# Patient Record
Sex: MALE | Race: BLACK OR AFRICAN AMERICAN | NOT HISPANIC OR LATINO | Employment: FULL TIME | URBAN - METROPOLITAN AREA
[De-identification: names, ages, dates, MRNs, and addresses within clinical notes are randomized per-mention and may not be internally consistent; named-entity substitution may affect disease eponyms.]

---

## 2018-08-20 ENCOUNTER — OFFICE VISIT (OUTPATIENT)
Dept: UROLOGY | Facility: CLINIC | Age: 33
End: 2018-08-20
Payer: COMMERCIAL

## 2018-08-20 VITALS
DIASTOLIC BLOOD PRESSURE: 107 MMHG | BODY MASS INDEX: 27.4 KG/M2 | WEIGHT: 225 LBS | HEART RATE: 90 BPM | HEIGHT: 76 IN | SYSTOLIC BLOOD PRESSURE: 146 MMHG

## 2018-08-20 DIAGNOSIS — R03.0 BLOOD PRESSURE ELEVATED WITHOUT HISTORY OF HTN: ICD-10-CM

## 2018-08-20 DIAGNOSIS — N20.0 NEPHROLITHIASIS: Primary | ICD-10-CM

## 2018-08-20 DIAGNOSIS — K76.89 LIVER CYST: ICD-10-CM

## 2018-08-20 DIAGNOSIS — K76.0 FATTY LIVER: ICD-10-CM

## 2018-08-20 DIAGNOSIS — R31.29 MICROHEMATURIA: ICD-10-CM

## 2018-08-20 PROCEDURE — 87086 URINE CULTURE/COLONY COUNT: CPT

## 2018-08-20 PROCEDURE — 99204 OFFICE O/P NEW MOD 45 MIN: CPT | Mod: S$GLB,,, | Performed by: UROLOGY

## 2018-08-20 PROCEDURE — 99999 PR PBB SHADOW E&M-NEW PATIENT-LVL IV: CPT | Mod: PBBFAC,,, | Performed by: UROLOGY

## 2018-08-20 PROCEDURE — 3008F BODY MASS INDEX DOCD: CPT | Mod: CPTII,S$GLB,, | Performed by: UROLOGY

## 2018-08-20 NOTE — PROGRESS NOTES
HPI:  Reji Pond Jr. is a 32 y.o. year old male that  presents with No chief complaint on file.  .  This patient comes for emergency room follow-up for ureteral stone    Patient has had prior kidney stones which he has passed spontaneously.  He has never had operations for stones.  Patient was recently in the emergency room with right flank pain.  Stone protocol CT scan reviewed by me which shows a distal minimally obstructing 1-2 mm right UVJ stone.  No other stones present.  Note also made of fatty liver and possible liver cysts for which follow-up imaging is recommended    Patient's pain has completely resolved.  He was having episode frequency which also has resolved.  Patient currently has no dysuria fevers or chills or gross hematuria    Further chart review shows labs from recent emergency room visit showing normal calcium and normal GFR      Past Medical History:   Diagnosis Date    Hypertension     Kidney stones      Social History     Socioeconomic History    Marital status: Single     Spouse name: Not on file    Number of children: Not on file    Years of education: Not on file    Highest education level: Not on file   Social Needs    Financial resource strain: Not on file    Food insecurity - worry: Not on file    Food insecurity - inability: Not on file    Transportation needs - medical: Not on file    Transportation needs - non-medical: Not on file   Occupational History    Not on file   Tobacco Use    Smoking status: Never Smoker    Smokeless tobacco: Never Used   Substance and Sexual Activity    Alcohol use: Yes     Comment: occasionally     Drug use: No    Sexual activity: Not on file   Other Topics Concern    Not on file   Social History Narrative    Not on file     History reviewed. No pertinent surgical history.  History reviewed. No pertinent family history.        Review of Systems  The patient has no chest pains.  The patient has no shortness of breath  Patient wears    "     glasses.  All other review of systems are negative.      Physical Exam:  BP (!) 146/107 (BP Location: Right arm, Patient Position: Sitting)   Pulse 90   Ht 6' 4" (1.93 m)   Wt 102.1 kg (225 lb)   BMI 27.39 kg/m²   General appearance: alert, cooperative, no distress  Constitutional:Oriented to person, place, and time.appears well-developed and well-nourished.   HEENT: Normocephalic, atraumatic, neck symmetrical, no nasal discharge   Eyes: conjunctivae/corneas clear, PERRL, EOM's intact  Lungs: clear to auscultation bilaterally, no dullness to percussion bilaterally  Heart: regular rate and rhythm without rub; no displacement of the PMI   Abdomen: soft, non-tender; bowel sounds normoactive; no organomegaly  :  Penis/perineum without lesions, scrotum without rash/cysts, epididymis nontender bilaterally, urethral meatus in normal location normal size, no penile plaques palpated testes equal in size without masses.  Extremities: extremities symmetric; no clubbing, cyanosis, or edema  Integument: Skin color, texture, turgor normal; no rashes; hair distrubution normal  Neurologic: Alert and oriented X 3, normal strength, normal coordination and gait  Psychiatric: no pressured speech; normal affect; no evidence of impaired cognition     LABS:    Complete Blood Count  Lab Results   Component Value Date    RBC 5.36 08/14/2018    HGB 14.6 08/14/2018    HCT 44.1 08/14/2018    MCV 82 08/14/2018    MCH 27.2 08/14/2018    MCHC 33.1 08/14/2018    RDW 13.7 08/14/2018     08/14/2018    MPV 11.6 08/14/2018    GRAN 4.5 08/14/2018    GRAN 63.7 08/14/2018    LYMPH 1.6 08/14/2018    LYMPH 22.1 08/14/2018    MONO 0.6 08/14/2018    MONO 8.9 08/14/2018    EOS 0.3 08/14/2018    BASO 0.04 08/14/2018    EOSINOPHIL 4.7 08/14/2018    BASOPHIL 0.6 08/14/2018    DIFFMETHOD Automated 08/14/2018       Comprehensive Metabolic Panel  Lab Results   Component Value Date    GLU 80 08/14/2018    BUN 13 08/14/2018    CREATININE 1.01 " 08/14/2018     08/14/2018    K 4.0 08/14/2018     08/14/2018    PROT 7.8 08/14/2018    ALBUMIN 4.3 08/14/2018    BILITOT 0.4 08/14/2018    AST 22 08/14/2018    ALKPHOS 97 08/14/2018    CO2 29 08/14/2018    ALT 40 08/14/2018    ANIONGAP 9 08/14/2018    EGFRNONAA >60.0 08/14/2018    ESTGFRAFRICA >60.0 08/14/2018       PSA  No results found for: PSA      Assessment:    ICD-10-CM ICD-9-CM    1. Nephrolithiasis N20.0 592.0    2. Fatty liver K76.0 571.8    3. Liver cyst K76.89 573.8 Ambulatory consult to Gastroenterology   4. Microhematuria R31.29 599.72 Urine culture   5. Blood pressure elevated without history of HTN R03.0 796.2      The primary encounter diagnosis was Nephrolithiasis. Diagnoses of Fatty liver, Liver cyst, Microhematuria, and Blood pressure elevated without history of HTN were also pertinent to this visit.      Plan:  1.  Nephrolithiasis.  Plan.  Although patient was not able to recover stone, by symptoms this has passed.    I encouraged patient to increase fluid intake.  Follow up with me on an as-needed basis    Numbers 2 and 3.  Fatty liver and liver cysts.  Plan.  This or these findings pointed out to the patient. Given radiology recommendation for further imaging, consult entered for GI evaluation.    4.  Micro hematuria.  Plan.  Undoubtedly due to stone passage.  Discussed cystoscopy for complete evaluation of hematuria to rule out urothelial cancer.  At this point patient does not want cystoscopy.  Given patient's young age and lack of risk factors, I think this reasonable.  Patient follow-up with me if he develops gross hematuria    5.   Elevated blood pressure.  Plan.  This finding pointed out to the patient.  I recommend that the patient follow up with the patient's PCP for this.  Orders Placed This Encounter   Procedures    Urine culture    Ambulatory consult to Gastroenterology           Denys Camara MD    PLEASE NOTE:  Please be advised that portions of this note were  dictated using voice recognition software and may contain dictation related errors in spelling/grammar/appropriate pronouns/syntax or other errors that might have not been found and or corrected on text review.

## 2018-08-21 LAB — BACTERIA UR CULT: NO GROWTH

## 2018-08-22 ENCOUNTER — TELEPHONE (OUTPATIENT)
Dept: UROLOGY | Facility: CLINIC | Age: 33
End: 2018-08-22

## 2018-08-22 NOTE — TELEPHONE ENCOUNTER
----- Message from Genoveva Miranda sent at 8/21/2018  6:08 PM CDT -----  Contact: patient  Patient says he received a message that he had an appointment set for tomorrow.    He wishes to speak to a nurse regarding this.    He can be reached at [phone]    Thanks  KB

## 2018-08-22 NOTE — TELEPHONE ENCOUNTER
----- Message from Denys Camara MD sent at 8/22/2018  8:26 AM CDT -----  Please contact the patient and let the patient know that urine culture showed no infection.

## 2018-09-06 ENCOUNTER — INITIAL CONSULT (OUTPATIENT)
Dept: GASTROENTEROLOGY | Facility: CLINIC | Age: 33
End: 2018-09-06
Payer: COMMERCIAL

## 2018-09-06 VITALS — WEIGHT: 218 LBS | BODY MASS INDEX: 26.54 KG/M2

## 2018-09-06 DIAGNOSIS — K76.89 LIVER CYST: Primary | ICD-10-CM

## 2018-09-06 PROCEDURE — 99999 PR PBB SHADOW E&M-EST. PATIENT-LVL II: CPT | Mod: PBBFAC,,,

## 2018-09-06 PROCEDURE — 99245 OFF/OP CONSLTJ NEW/EST HI 55: CPT | Mod: S$GLB,,, | Performed by: INTERNAL MEDICINE

## 2018-09-06 NOTE — PROGRESS NOTES
Subjective:      Patient ID: Reji Pond Jr. is a 32 y.o. male.    Chief Complaint: GI Problem (Liver Cyst)      HPI:  Reji Pond Jr. is a 32 y.o. male healthy except for HTN and kidney stones coming for incidental finding of liver lesion on non contrasted CT. Patient denies any GI symptoms. He denies any FHX of liver disease or malignancy. Recent intentional weight loss. He denies IVDU, smoking or significant alcohol use.      Review of patient's allergies indicates:  No Known Allergies    Past Medical History:   Diagnosis Date    Hypertension     Kidney stones        History reviewed. No pertinent surgical history.    History reviewed. No pertinent family history.    Social History     Socioeconomic History    Marital status: Single     Spouse name: None    Number of children: None    Years of education: None    Highest education level: None   Social Needs    Financial resource strain: None    Food insecurity - worry: None    Food insecurity - inability: None    Transportation needs - medical: None    Transportation needs - non-medical: None   Occupational History    None   Tobacco Use    Smoking status: Former Smoker    Smokeless tobacco: Never Used   Substance and Sexual Activity    Alcohol use: Yes     Frequency: 2-3 times a week     Drinks per session: 3 or 4     Binge frequency: Never     Comment: occasionally     Drug use: No    Sexual activity: None   Other Topics Concern    None   Social History Narrative    None       Current Outpatient Medications   Medication Sig Dispense Refill    diclofenac (VOLTAREN) 25 MG TbEC Take 1 tablet (25 mg total) by mouth 3 (three) times daily. 15 tablet 0    fluticasone (FLONASE) 50 mcg/actuation nasal spray 1 spray (50 mcg total) by Each Nare route 2 (two) times daily as needed. 15 g 0    ondansetron (ZOFRAN) 4 MG tablet Take 1 tablet (4 mg total) by mouth every 6 (six) hours. 12 tablet 0    tamsulosin (FLOMAX) 0.4 mg Cap Take 1 capsule  (0.4 mg total) by mouth once daily. for 7 days 7 capsule 0     No current facility-administered medications for this visit.        Review of Systems   Constitutional: Negative for activity change, appetite change, chills and fever.   HENT: Negative for ear pain, hearing loss, mouth sores and sore throat.    Eyes: Negative for pain, discharge, redness, itching and visual disturbance.   Respiratory: Negative for cough, shortness of breath and wheezing.    Cardiovascular: Negative for chest pain and palpitations.   Gastrointestinal: Negative for abdominal distention, abdominal pain, blood in stool, constipation, diarrhea and nausea.   Endocrine: Negative for cold intolerance and heat intolerance.   Skin: Negative for color change and rash.   Neurological: Negative for dizziness and headaches.   Psychiatric/Behavioral: Negative for confusion.       Objective:     Physical Exam   Constitutional: He is oriented to person, place, and time. No distress.   HENT:   Head: Normocephalic.   Eyes: Conjunctivae and EOM are normal. Pupils are equal, round, and reactive to light. No scleral icterus.   Neck: Normal range of motion. No thyromegaly present.   Cardiovascular: Normal rate, regular rhythm and normal heart sounds. Exam reveals no gallop and no friction rub.   No murmur heard.  Pulmonary/Chest: Breath sounds normal. No respiratory distress. He has no wheezes.   Abdominal: Soft. Bowel sounds are normal. He exhibits no distension. There is no tenderness. There is no guarding.   Lymphadenopathy:     He has no cervical adenopathy.   Neurological: He is alert and oriented to person, place, and time. No cranial nerve deficit.   Skin: Skin is warm and dry. No rash noted. He is not diaphoretic.   Psychiatric: He has a normal mood and affect. His behavior is normal.       Assessment:     Liver lesions  History is insignificant. CT was non-contrast.     Plan:     Perform a triple phase CT of the liver.   Refer to hepatology based on  results.

## 2018-09-09 ENCOUNTER — DOCUMENTATION ONLY (OUTPATIENT)
Dept: TRANSPLANT | Facility: CLINIC | Age: 33
End: 2018-09-09

## 2018-09-09 NOTE — LETTER
September 9, 2018    Reji Pond  [id number]  [facility] [address]      Dear Reji Pond:    Your doctor has referred you to the [facility]. We are sending this letter as a reminder for you to make an appointment with us to complete the referral process.   Please call us at your earliest convenience at [phone] to schedule your appointment.  We look forward to seeing you soon.  If you received a call, and are scheduled, please disregard this letter..       Sincerely,        [facility]   [address] [place] [id number]  [phone]

## 2018-09-10 ENCOUNTER — HOSPITAL ENCOUNTER (OUTPATIENT)
Dept: RADIOLOGY | Facility: HOSPITAL | Age: 33
Discharge: HOME | End: 2018-09-10
Attending: INTERNAL MEDICINE
Payer: COMMERCIAL

## 2018-09-10 DIAGNOSIS — K76.89 LIVER CYST: ICD-10-CM

## 2018-09-10 PROCEDURE — 74160 CT ABDOMEN W/CONTRAST: CPT | Mod: TC

## 2018-09-10 PROCEDURE — 74160 CT ABDOMEN W/CONTRAST: CPT | Mod: 26,,, | Performed by: RADIOLOGY

## 2018-09-10 PROCEDURE — 25500020 PHARM REV CODE 255: Performed by: INTERNAL MEDICINE

## 2018-09-10 RX ADMIN — IOHEXOL 100 ML: 350 INJECTION, SOLUTION INTRAVENOUS at 11:09

## 2018-09-10 NOTE — NURSING
Pt records reviewed.   Pt will be referred to Hepatology.    Initial referral received  from the [facility].   Referring Provider/diagnosis  ODALYS MCMILLAN Provider:   Diagnosis: Liver cyst           Referral letter sent to patient.

## 2018-09-12 ENCOUNTER — TELEPHONE (OUTPATIENT)
Dept: HEPATOLOGY | Facility: CLINIC | Age: 33
End: 2018-09-12

## 2018-09-12 ENCOUNTER — TELEPHONE (OUTPATIENT)
Dept: ENDOSCOPY | Facility: HOSPITAL | Age: 33
End: 2018-09-12

## 2018-09-12 NOTE — TELEPHONE ENCOUNTER
----- Message from Bishnu Esquivel MD sent at 9/11/2018  5:50 PM CDT -----  Lesions of the liver are benign and no need for further workup.

## 2018-09-12 NOTE — TELEPHONE ENCOUNTER
----- Message from Lennie Durham sent at 9/10/2018  3:36 PM CDT -----  Regarding: REFERRAL  Iram, can you offer Mr. Salty Morgan?  Thanks!    Marline  ----- Message -----  From: Suzanna Yen  Sent: 9/9/2018  10:54 PM  To: Hepatology Scheduling    Pt records reviewed.   Pt will be referred to Hepatology.    [facility]   Referring Provider/diagnosis  ODALYS MCMILLAN Provider:  Diagnosis: Liver cyst

## 2018-10-23 ENCOUNTER — OFFICE VISIT (OUTPATIENT)
Dept: HEPATOLOGY | Facility: CLINIC | Age: 33
End: 2018-10-23
Payer: COMMERCIAL

## 2018-10-23 VITALS
HEART RATE: 98 BPM | RESPIRATION RATE: 18 BRPM | DIASTOLIC BLOOD PRESSURE: 74 MMHG | HEIGHT: 72 IN | WEIGHT: 213.38 LBS | SYSTOLIC BLOOD PRESSURE: 128 MMHG | BODY MASS INDEX: 28.9 KG/M2 | OXYGEN SATURATION: 98 % | TEMPERATURE: 98 F

## 2018-10-23 DIAGNOSIS — R79.89 ELEVATED LIVER FUNCTION TESTS: Primary | ICD-10-CM

## 2018-10-23 DIAGNOSIS — K76.0 FATTY LIVER: ICD-10-CM

## 2018-10-23 DIAGNOSIS — D18.03 HEMANGIOMA OF LIVER: ICD-10-CM

## 2018-10-23 PROCEDURE — 99999 PR PBB SHADOW E&M-EST. PATIENT-LVL IV: CPT | Mod: PBBFAC,,, | Performed by: INTERNAL MEDICINE

## 2018-10-23 PROCEDURE — 3008F BODY MASS INDEX DOCD: CPT | Mod: CPTII,S$GLB,, | Performed by: INTERNAL MEDICINE

## 2018-10-23 PROCEDURE — 99204 OFFICE O/P NEW MOD 45 MIN: CPT | Mod: S$GLB,,, | Performed by: INTERNAL MEDICINE

## 2018-10-23 NOTE — PROGRESS NOTES
HEPATOLOGY CONSULTATION    Referring Physician: Bishnu Esquivel MD  Current Corresponding Physician: Bishnu Esquivel MD    Reason for Consultation: Consultation for evaluation of Liver lesions and Elevated Hepatic Enzymes    History of Present Illness: Reji Pond Jr. is a 33 y.o. malewho had a CT scan of the abdomen done 9/10./18: The liver is mildly enlarged and normal in attenuation.  Three arterial enhancing foci are seen in the right hepatic lobe, measuring 5.5 cm in segment VII, 2.3 cm in segment VI/VII, and 2 cm in segment VI.  These lesions demonstrate peripheral, nodular, discontinuous enhancement with progressive filling.  Findings are most consistent with hemangiomas.  A ct scan done 8/14/18 to evaluate kidney stones revealed the liver to be enlarged and fatty. Liver enzymes have been elevated:  2/3/18: , AST 36, ALKP 95. 8/14/18: ALT 40, AST 22, ALKP 97. Liver function is normal. Plts are well preserved (259). The patient drinks socially ~5/7 days per week. There is no family hx of liver disease. The patient denies any symptoms of decompensated cirrhosis, including no ascites or edema, cognitive problems that would suggest hepatic encephalopathy, or GI bleeding from varices.      Chief Complaint   Patient presents with    Liver lesions    Elevated Hepatic Enzymes       Past Medical History:   Diagnosis Date    Hypertension     Kidney stones      Outpatient Encounter Medications as of 10/23/2018   Medication Sig Dispense Refill    [DISCONTINUED] diclofenac (VOLTAREN) 25 MG TbEC Take 1 tablet (25 mg total) by mouth 3 (three) times daily. 15 tablet 0    [DISCONTINUED] fluticasone (FLONASE) 50 mcg/actuation nasal spray 1 spray (50 mcg total) by Each Nare route 2 (two) times daily as needed. 15 g 0    [DISCONTINUED] ondansetron (ZOFRAN) 4 MG tablet Take 1 tablet (4 mg total) by mouth every 6 (six) hours. 12 tablet 0    [DISCONTINUED] tamsulosin (FLOMAX) 0.4 mg Cap Take 1 capsule  (0.4 mg total) by mouth once daily. for 7 days 7 capsule 0     No facility-administered encounter medications on file as of 10/23/2018.      Review of patient's allergies indicates:  No Known Allergies  History reviewed. No pertinent family history.    Social History     Socioeconomic History    Marital status: Single     Spouse name: Not on file    Number of children: Not on file    Years of education: Not on file    Highest education level: Not on file   Social Needs    Financial resource strain: Not on file    Food insecurity - worry: Not on file    Food insecurity - inability: Not on file    Transportation needs - medical: Not on file    Transportation needs - non-medical: Not on file   Occupational History    Not on file   Tobacco Use    Smoking status: Former Smoker    Smokeless tobacco: Never Used   Substance and Sexual Activity    Alcohol use: Yes     Frequency: 2-3 times a week     Drinks per session: 3 or 4     Binge frequency: Never     Comment: occasionally     Drug use: No    Sexual activity: Not on file   Other Topics Concern    Not on file   Social History Narrative    Not on file     Review of Systems   Constitutional: Negative.    HENT: Negative.    Eyes: Negative.    Respiratory: Negative.    Cardiovascular: Negative.    Gastrointestinal: Negative.    Genitourinary: Negative.    Musculoskeletal: Negative.    Skin: Negative.    Neurological: Negative.    Psychiatric/Behavioral: Negative.      Vitals:    10/23/18 1045   BP: 128/74   Pulse: 98   Resp: 18   Temp: 98.1 °F (36.7 °C)       Physical Exam   Constitutional: He is oriented to person, place, and time. He appears well-developed and well-nourished.   HENT:   Head: Normocephalic and atraumatic.   Eyes: Conjunctivae and EOM are normal. Pupils are equal, round, and reactive to light. No scleral icterus.   Neck: Normal range of motion. Neck supple. No thyromegaly present.   Cardiovascular: Normal rate, regular rhythm and normal  heart sounds.   Pulmonary/Chest: Effort normal and breath sounds normal. He has no rales.   Abdominal: Soft. Bowel sounds are normal. He exhibits no distension and no mass. There is no tenderness.   Musculoskeletal: Normal range of motion. He exhibits no edema.   Neurological: He is alert and oriented to person, place, and time.   Skin: Skin is warm and dry. No rash noted.   Psychiatric: He has a normal mood and affect.   Vitals reviewed.      MELD-Na score: 6 at 2/3/2018  1:43 AM  MELD score: 6 at 2/3/2018  1:43 AM  Calculated from:  Serum Creatinine: 0.88 mg/dL (Rounded to 1 mg/dL) at 2/3/2018  1:43 AM  Serum Sodium: 143 mmol/L (Rounded to 137 mmol/L) at 2/3/2018  1:43 AM  Total Bilirubin: 0.5 mg/dL (Rounded to 1 mg/dL) at 2/3/2018  1:43 AM  INR(ratio): 1 at 2/3/2018  1:43 AM  Age: 32 years    Lab Results   Component Value Date    GLU 80 08/14/2018    BUN 13 08/14/2018    CREATININE 1.01 08/14/2018    CALCIUM 9.1 08/14/2018     08/14/2018    K 4.0 08/14/2018     08/14/2018    PROT 7.8 08/14/2018    CO2 29 08/14/2018    ANIONGAP 9 08/14/2018    WBC 7.06 08/14/2018    RBC 5.36 08/14/2018    HGB 14.6 08/14/2018    HCT 44.1 08/14/2018    MCV 82 08/14/2018    MCH 27.2 08/14/2018    MCHC 33.1 08/14/2018     Lab Results   Component Value Date    RDW 13.7 08/14/2018     08/14/2018    MPV 11.6 08/14/2018    GRAN 4.5 08/14/2018    GRAN 63.7 08/14/2018    LYMPH 1.6 08/14/2018    LYMPH 22.1 08/14/2018    MONO 0.6 08/14/2018    MONO 8.9 08/14/2018    EOSINOPHIL 4.7 08/14/2018    BASOPHIL 0.6 08/14/2018    EOS 0.3 08/14/2018    BASO 0.04 08/14/2018    APTT 30.0 02/03/2018    ALBUMIN 4.3 08/14/2018    AST 22 08/14/2018    ALT 40 08/14/2018    ALKPHOS 97 08/14/2018    LABPROT 12.3 02/03/2018    INR 1.0 02/03/2018       Assessment and Plan:  There is no problem list on file for this patient.    Reji Leonel Pond Jr. is a 33 y.o. male with Liver lesions and Elevated Hepatic Enzymes  My current recommendations:  1.  Liver lesions are c/w hemangiomas and do not require any further f/u.  2. Elevated liver tests and fatty liver on imaging: this could be from ERVIN vs PATINO. I have recommended that the patient cut down on the amount that he is drinking and attempt to lose some weight. I will screen him for other causes of elevated liver enzymes and will also do a fibroscan to noninvasively stage his liver disease.  Return 1 month

## 2018-10-24 ENCOUNTER — TELEPHONE (OUTPATIENT)
Dept: HEPATOLOGY | Facility: CLINIC | Age: 33
End: 2018-10-24

## 2018-10-25 PROBLEM — R79.89 ELEVATED LIVER FUNCTION TESTS: Status: ACTIVE | Noted: 2018-10-25

## 2018-10-25 PROBLEM — D18.03 HEMANGIOMA OF LIVER: Status: ACTIVE | Noted: 2018-10-25

## 2018-10-25 PROBLEM — K76.0 FATTY LIVER: Status: ACTIVE | Noted: 2018-10-25

## 2020-06-10 ENCOUNTER — HOSPITAL ENCOUNTER (OUTPATIENT)
Dept: RADIOLOGY | Facility: HOSPITAL | Age: 35
Discharge: HOME | End: 2020-06-10
Attending: NURSE PRACTITIONER
Payer: COMMERCIAL

## 2020-06-10 ENCOUNTER — OFFICE VISIT (OUTPATIENT)
Dept: ORTHOPEDICS | Facility: CLINIC | Age: 35
End: 2020-06-10
Payer: COMMERCIAL

## 2020-06-10 VITALS
SYSTOLIC BLOOD PRESSURE: 168 MMHG | HEIGHT: 72 IN | WEIGHT: 227.5 LBS | DIASTOLIC BLOOD PRESSURE: 101 MMHG | BODY MASS INDEX: 30.81 KG/M2 | HEART RATE: 92 BPM | TEMPERATURE: 99 F

## 2020-06-10 DIAGNOSIS — M25.562 ACUTE PAIN OF LEFT KNEE: Primary | ICD-10-CM

## 2020-06-10 DIAGNOSIS — M25.562 ACUTE PAIN OF LEFT KNEE: ICD-10-CM

## 2020-06-10 PROCEDURE — 99999 PR PBB SHADOW E&M-EST. PATIENT-LVL III: CPT | Mod: PBBFAC,,, | Performed by: NURSE PRACTITIONER

## 2020-06-10 PROCEDURE — 73564 XR KNEE ORTHO LEFT WITH FLEXION: ICD-10-PCS | Mod: 26,LT,, | Performed by: RADIOLOGY

## 2020-06-10 PROCEDURE — 73564 X-RAY EXAM KNEE 4 OR MORE: CPT | Mod: 26,LT,, | Performed by: RADIOLOGY

## 2020-06-10 PROCEDURE — 99203 OFFICE O/P NEW LOW 30 MIN: CPT | Mod: S$GLB,,, | Performed by: NURSE PRACTITIONER

## 2020-06-10 PROCEDURE — 3008F BODY MASS INDEX DOCD: CPT | Mod: CPTII,S$GLB,, | Performed by: NURSE PRACTITIONER

## 2020-06-10 PROCEDURE — 73562 XR KNEE ORTHO LEFT WITH FLEXION: ICD-10-PCS | Mod: 26,RT,, | Performed by: RADIOLOGY

## 2020-06-10 PROCEDURE — 99203 PR OFFICE/OUTPT VISIT, NEW, LEVL III, 30-44 MIN: ICD-10-PCS | Mod: S$GLB,,, | Performed by: NURSE PRACTITIONER

## 2020-06-10 PROCEDURE — 99999 PR PBB SHADOW E&M-EST. PATIENT-LVL III: ICD-10-PCS | Mod: PBBFAC,,, | Performed by: NURSE PRACTITIONER

## 2020-06-10 PROCEDURE — 73562 X-RAY EXAM OF KNEE 3: CPT | Mod: TC,RT,59

## 2020-06-10 PROCEDURE — 3008F PR BODY MASS INDEX (BMI) DOCUMENTED: ICD-10-PCS | Mod: CPTII,S$GLB,, | Performed by: NURSE PRACTITIONER

## 2020-06-10 PROCEDURE — 73562 X-RAY EXAM OF KNEE 3: CPT | Mod: 26,RT,, | Performed by: RADIOLOGY

## 2020-06-10 NOTE — PROGRESS NOTES
"CC: Pain of the Left Knee      HPI: Pt with c/o  for left knee pain intermittently for the past two years. The pain started after he slipped at work and fell onto his left knee folded under him. He was seen at urgent care and iced and elevated. Since then he has had several instances where the knee popped and then hurt acutely before calming back down. There is catching and locking of the knee. He is a  and using the clutch and bouncing around on the seat aggravates the left knee pain and stiffness. He has taken voltaren without relief. He is ambulating without assistive device. There is not a limp.    ROS  General: denies fever and chills  Resp: no c/o sob  CVS: no c/o cp  MSK: c/o left knee pain which is acute and follows popping of the knee with catching, locking and stiffness    PE  General: AAOx3, pleasant and cooperative  Resp: respirations even and unlabored  MSK: left knee exam  0 degrees extension  130 degrees flexion  No warmth or erythema   - effusion  - tenderness over joint line  + katharine, medial  - crepitus  - instability    Xray:  Reviewed by me with the patient: Bones are well mineralized.  Joint spaces are well maintained.  No effusion on the left    Assessment:  Left knee injury    Plan:  In light of normal xray and lack of relief with voltaren and catching and locking with + katharine, will get an MRI to further evaluate the pain  voltaren prn   RICE  Advised patient that he needs to see his pcp regarding his elevated blood pressure. He states that it is "white coat syndrome", but I advised further evaluation.  F/u results by phone    "

## 2020-06-17 ENCOUNTER — HOSPITAL ENCOUNTER (OUTPATIENT)
Dept: RADIOLOGY | Facility: HOSPITAL | Age: 35
Discharge: HOME | End: 2020-06-17
Attending: NURSE PRACTITIONER
Payer: COMMERCIAL

## 2020-06-17 DIAGNOSIS — M25.562 ACUTE PAIN OF LEFT KNEE: ICD-10-CM

## 2020-06-17 PROCEDURE — 73721 MRI JNT OF LWR EXTRE W/O DYE: CPT | Mod: TC,LT

## 2020-06-17 PROCEDURE — 73721 MRI JNT OF LWR EXTRE W/O DYE: CPT | Mod: 26,LT,, | Performed by: RADIOLOGY

## 2020-06-17 PROCEDURE — 73721 MRI KNEE WITHOUT CONTRAST LEFT: ICD-10-PCS | Mod: 26,LT,, | Performed by: RADIOLOGY

## 2020-06-22 ENCOUNTER — TELEPHONE (OUTPATIENT)
Dept: ORTHOPEDICS | Facility: CLINIC | Age: 35
End: 2020-06-22

## 2020-06-22 NOTE — TELEPHONE ENCOUNTER
----- Message from Jayden Huizar sent at 6/22/2020  3:06 PM CDT -----  Contact: Patient @[phone]  Patient returning a missed call, pls return call

## 2020-06-22 NOTE — TELEPHONE ENCOUNTER
Returned call to patient. 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

## 2020-06-22 NOTE — TELEPHONE ENCOUNTER
Called patient regarding his MRI results. Left a message letting him know about the area where the cartilage has worn down. Will send him a message as well so that he can call back at his convenience.

## 2020-07-15 ENCOUNTER — TELEPHONE (OUTPATIENT)
Dept: SPORTS MEDICINE | Facility: CLINIC | Age: 35
End: 2020-07-15

## 2020-07-15 ENCOUNTER — OFFICE VISIT (OUTPATIENT)
Dept: SPORTS MEDICINE | Facility: CLINIC | Age: 35
End: 2020-07-15
Payer: COMMERCIAL

## 2020-07-15 ENCOUNTER — HOSPITAL ENCOUNTER (OUTPATIENT)
Dept: RADIOLOGY | Facility: HOSPITAL | Age: 35
Discharge: HOME | End: 2020-07-15
Attending: ORTHOPAEDIC SURGERY
Payer: COMMERCIAL

## 2020-07-15 VITALS
HEART RATE: 84 BPM | WEIGHT: 227 LBS | BODY MASS INDEX: 30.75 KG/M2 | HEIGHT: 72 IN | DIASTOLIC BLOOD PRESSURE: 107 MMHG | SYSTOLIC BLOOD PRESSURE: 159 MMHG

## 2020-07-15 DIAGNOSIS — M94.262 CHONDROMALACIA OF LEFT KNEE: ICD-10-CM

## 2020-07-15 DIAGNOSIS — M25.562 CHRONIC PAIN OF LEFT KNEE: ICD-10-CM

## 2020-07-15 DIAGNOSIS — M25.561 RIGHT KNEE PAIN, UNSPECIFIED CHRONICITY: ICD-10-CM

## 2020-07-15 DIAGNOSIS — G89.29 CHRONIC PAIN OF LEFT KNEE: ICD-10-CM

## 2020-07-15 DIAGNOSIS — M25.662 KNEE STIFFNESS, LEFT: ICD-10-CM

## 2020-07-15 DIAGNOSIS — Z01.818 PREOPERATIVE TESTING: Primary | ICD-10-CM

## 2020-07-15 DIAGNOSIS — M23.92 ACUTE INTERNAL DERANGEMENT OF LEFT KNEE: Primary | ICD-10-CM

## 2020-07-15 PROCEDURE — 99999 PR PBB SHADOW E&M-EST. PATIENT-LVL IV: ICD-10-PCS | Mod: PBBFAC,,, | Performed by: ORTHOPAEDIC SURGERY

## 2020-07-15 PROCEDURE — 73564 X-RAY EXAM KNEE 4 OR MORE: CPT | Mod: TC,50

## 2020-07-15 PROCEDURE — 99999 PR PBB SHADOW E&M-EST. PATIENT-LVL IV: CPT | Mod: PBBFAC,,, | Performed by: ORTHOPAEDIC SURGERY

## 2020-07-15 PROCEDURE — 3008F PR BODY MASS INDEX (BMI) DOCUMENTED: ICD-10-PCS | Mod: CPTII,S$GLB,, | Performed by: ORTHOPAEDIC SURGERY

## 2020-07-15 PROCEDURE — 3008F BODY MASS INDEX DOCD: CPT | Mod: CPTII,S$GLB,, | Performed by: ORTHOPAEDIC SURGERY

## 2020-07-15 PROCEDURE — 99203 PR OFFICE/OUTPT VISIT, NEW, LEVL III, 30-44 MIN: ICD-10-PCS | Mod: S$GLB,,, | Performed by: ORTHOPAEDIC SURGERY

## 2020-07-15 PROCEDURE — 99203 OFFICE O/P NEW LOW 30 MIN: CPT | Mod: S$GLB,,, | Performed by: ORTHOPAEDIC SURGERY

## 2020-07-15 PROCEDURE — 73564 X-RAY EXAM KNEE 4 OR MORE: CPT | Mod: 26,,, | Performed by: RADIOLOGY

## 2020-07-15 PROCEDURE — 73564 XR KNEE ORTHO BILAT WITH FLEXION: ICD-10-PCS | Mod: 26,,, | Performed by: RADIOLOGY

## 2020-07-15 RX ORDER — CELECOXIB 200 MG/1
200 CAPSULE ORAL 2 TIMES DAILY
Qty: 60 CAPSULE | Refills: 6 | Status: SHIPPED | OUTPATIENT
Start: 2020-07-15 | End: 2020-08-14

## 2020-07-15 NOTE — PATIENT INSTRUCTIONS
The knee is the most frequently injured joint in the body. Most injuries are due to the extreme stresses during twisting or turning activities or sports. The knee joint is made up of three bones, four major ligaments and two types of cartilage.    FEMUR (Thigh Bone)  The femoral condyles are the two rounded prominences at the end of the femur. The motion of the condyles include rocking, gliding and rotating. Any abnormal surface structure or cartilage damage can lead to cartilage breakdown and arthritis (loss of cartilage padding).    TIBIA (Shin Bone)  The Tibia meets the Femur at the knee in two areas on which the Femur rides. This area is called the Tibial Plateau.    PATELLA (Knee Cap)  The Patella is a bone that lies within the quadriceps tendon. It rides in the shallow groove over the front part if the Femur called the Trochlea. The Patella acts as a lever arm to help the quadriceps muscle extend the knee.    Articular Cartilage covers the ends of these bones at the knee joint. This glistening white substance has the consistency of firm rubber but is actually a mixture of collagen and special large sponge-like molecules all maintained by living cartilage cells (chondrocytes). With normal joint fluid for lubrication, the surface is more slippery than ice on ice and allows smooth and easy knee joint motion.      MENISCUS  The other type of knee cartilage is the Meniscal Cartilage (fibrocartilage). These C-shaped pads are found between the thigh bone and shin bone -- one on each side -- thus called the Medial Meniscus (inner thigh aspect) and Lateral Meniscus (outer aspect). The menisci are attached to the Tibial Plateaus, and serve to cushion and transfer joint force more evenly to the tibia. They accomplish this by distributing joint forces over a larger area of the joint -- transferring force from the curved femoral condylar margins to the flatter tibial plateaus. Damage to the meniscal cushion may result  "in increased stress on the articular cartilage of the tibia and femur and early arthritis.      The smooth, white shiny covering of the bones in the joint is known as Articular Cartilage, and is made of a material called "hyaline cartilage". Damage to this articular cartilage can occur from trauma (such as a fall or car accident), but more often, it is the result of repetitive injuries over a long period of time.    Articular cartilage injuries are common, occurring in 20- 70% of knee injuries. The function of articular cartilage is to optimize joint function by reducing friction and increasing shock absorption. Articular cartilage is similar to the "tread on a car tire".    Injuries to the articular cartilage have a low capacity for healing. Both superficial and full-thickness cartilage injuries may progress to the mechanical wear and breakdown of the cartilage matrix.  The breakdown of articular cartilage will eventually cause osteoarthritis, which is a very serious painful condition. With a full-thickness cartilage injury, continued activity may cause the articular cartilage injury to progress rapidly to traumatic arthritis. The larger the initial cartilage injury, the faster the potential progression of arthritis. Articular cartilage injury or wear leads to joint pain.      Common symptoms include pain when the joint is moved or loaded (like walking or running). Catching, locking, or creaking (crepitation) may occur with joint motion or weight bearing (like twisting or standing  from a seated position). Articular cartilage damage may be superficial (partial), deep (complete full-thickness), or osteochondral (bone and cartilage).    Superficial cartilage injuries extend into the upper 50% of the depth of the cartilage. These injuries typically do not heal, but may not progress to arthritis unless they are large and located in a weight-bearing area of the knee.  Deep or full-thickness lesions extend down to the " "bone, but not through it. These injuries have very little healing potential and tend to progress to osteoarthritis if located in a weight-bearing area.    Osteochondral (bone plus cartilage) injuries extend down through the subchondral bone (deep to the cartilage). These injuries may heal by allowing blood vessel ingrowth with fibrous cells to produce a fibrous (scar-like) tissue repair.      Treatment Options For Smaller Defects  Most studies suggest the repair tissue formed from bone marrow stimulation techniques is fibrocartilage (scar tissue -not hyaline cartilage), which is less resistant to wear with the forces in the knee joint and often deteriorates over time Bone marrow stimulation techniques can be successful in eliminating symptoms in many patients, especially young patients with small lesions.   Without early intervention, cartilage degeneration may proceed, and prevent a chance for successful pain- free function.    Arthroscopic Debridement  This is an arthroscopic procedure, often known as a "knee scope". The surgeon uses minimally invasive techniques with a small fiberoptic light source attached to a video camera to locate damaged cartilage and trim the loose edges away to smooth the surface. The surgeon may trim meniscus tears and remove loose cartilage that causes catching or locking symptoms and attempts to prevent any further flaking off that can irritate the knee joint lining and cause swelling.  Arthroscopic debridement is most effective for small lesions, less than 1 cm2 (3/8 inch). It is not as effective for larger lesions because it does not fill the lesion with any repair tissue, leaving the edges exposed to high forces in the knee and continued wear. Despite relieving some symptoms from cartilage tears or loss, arthroscopic cleaning does not prevent the progression of arthritis, but may relieve mechanical symptoms.    Bone Marrow Stimulation Techniques  Three different techniques are " "available to create bleeding and clot formation at the cartilage defect. Blood vessels and fibrous cells migrate to the area to form a fibrous scar-tissue repair tissue to fill the hole in the articular cartilage. Drilling creates multiple small holes through the subchondral bone to allow bleeding into the defect.   Microfracture or "picking" creates small fractures in the subchondral bone to encourage bleeding into the defect.      Abrasion arthroplasty is a superficial shaving of the subchondral bone surface to create bleeding into the defect. Bone marrow stimulation techniques are successful in eliminating symptoms in many patients, especially younger patients with smaller lesions well contained lesions.       For patients with more extensive cartilage damage there are several techniques available    Osteochondral Autograft  This technique is analogous to a hair-plug transfer. The surgeon removes a small section of the patient's own cartilage along with the underlying bone plug, hence the name osteochondral (bone and cartilage) graft.      Bone and cartilage cylinders are harvested from a minor weight bearing area of the knee joint and transplanted into prepared holes in the damage area. This process works much like a hair transplant. Unfortunately, a limited amount of normal osteochondral tissue is available for harvest. The typical size of defect treatable with this method is between 1 to 2 cm2.      Treatment Options For Larger Lesions    Autologous Chondrocyte Implantation (ACI) Carticel  For cartilage defects greater than 2 square centimeters or if there are multiple defects in the knee, one of the newer techniques for the cartilage regeneration, is ACI. ACI is FDA indicated for full-thickness cartilage or osteochondral lesions located in the knee.    ACI is performed in two stages. The first stage is performed when initially assessing the joint arthroscopically. A small amount of cartilage is harvested and " sent to a laboratory for cell culture and growth.    The patient's own cartilage cells (chondrocytes) are grown in culture increasing the number of cells by 10 fold.    Twelve million chondrocytes are then re-implanted into the cartilage defect and covered with a ceiling of native tissue (periosteum).    The cells then grow to fill the defect and resurface areas of cartilage loss with hyaline-like cartilage.    The long-term outcomes (of 14 years) are encouraging for treating medium and large cartilage lesions. ACI is the only procedure with a formal patient outcomes registry.    Osteochondral Allograft     For larger defects that involve both cartilage and bone loss, surgeons may custom fit the defect with a cadaver implant of donated cartilage and bone. The transplanted tissue is matched to the patient based on size of the knee, but tissue typing (similar to organ transplantation) is not necessary. Osteochondral transplantation may allow restoration of the joint surface. The long-term outcomes with fresh allograft (cadaver) tissue have been very encouraging.      RESTORING THE MENISCUS  Our goal is to maintain normal anatomy, if possible. In the case of meniscal tears, the first line of treatment is attempt at repair. Historically, in the days of open cartilage surgery, the entire meniscus was removed. Unfortunately, long term follow-up studies of these patients after removal of the meniscus have found that many go on to degenerative arthritis. Today, cartilage surgeons recognize the protective value of the meniscal cartilage and make every attempt to conserve this valuable tissue.    To maximally preserve function after a meniscus tear, surgeons may repair the meniscus using a variety of techniques. Options include using special sutures or absorbable implants to staple, kathleen, or otherwise fix and secure the torn meniscal cushion.    Replacing The Meniscus  For patients who have had the meniscus removed, an  innovative solution called a meniscal transplant may be an option. The indications for transplant need to be assessed by your cartilage surgeon. Unlike some other forms of tissue transplantation, this procedure does not require patients to be on medications to prevent organ rejection. Intermediate term outcome studies are encouraging.        RESTORING KNEE ALIGNMENT    Osteotomy  When the bones of the knee do not align properly, joint forces are not evenly distributed and may overload one side of the knee causing pain and cartilage degeneration. This overload problem is made worse when there has been a traumatic cartilage injury or the meniscus has been removed.    An osteotomy is designed to shift the stress from the damaged part of the knee to a more normal area in the knee. An osteotomy requires the surgeon to cut the bone in order to remove a wedge of bone in order to adjust the angle of the knee. For individuals with medial compartment narrowing (the most common situation), there are three options for high tibial osteotomy (HTO).      One involves removing a wedge shape piece of bone from the lateral side of the tibia and then closing the remaining surfaces together and holding it with a plate and screws (Fig A).    The second technique involves making one cut partially across the top of the tibia and opening the bone to establish the correct alignment. This is held in place with a plate and screws and a bone graft. (Fig B)    The final technique is called medial hemicallotasis, which means stretching healing bone. This technique requires a single cut partially across the bone. The bone is then gradually opened on the medial side by an external fixation frame. This technique is attractive because it allows adjustments to be made in the angle of correction and the hardware is removed three months after the procedure (Fig C).      Each of these techniques require a period of non-weightbearing or partial  "weightbearing crutch ambulation. These techniques may be necessary at the same time or prior to other reconstructive procedures such as cartilage replacement or meniscus replacement surgery in order to remove excessive forces off the repair site.      OSTEOARTHRITIS    Partial (Unicompartmental) Joint Replacement  This procedure replaces only the damaged portion of the joint with metal and/or plastic, leaving the remaining portion of the joint intact. It is often known as a partial knee replacement. New techniques allow replacement of a portion of the knee joint through smaller incisions with fewer days of hospitalization required.    Total Joint Replacement  If there is extensive damage with bone-on-bone apposition, many of the above procedures are not indicated. In these cases of end-stage arthritis, the entire joint surface is replaced with artificial metal and plastic components, allowing most patients to return to pain-free activities.    Future Trends  Investigators are now examining the potential of using collagen or other biologic tissues to serve as a bridge or scaffold for the body's own healing/repair mechanism to use in reestablishing meniscal form and function. In the future, biological "healing glues" may become available to allow repair without sutures. Even with the newest techniques available, certain tears are not repairable and a portion of the meniscus is removed using the arthroscope (partial meniscectomy).    The term osteoarthritis indicates the degeneration and excessive wear of articular cartilage with gradual changes occurring in the underlying bone.    Initially the articular cartilage softens, the surface becomes uneven and the cartilage frays and develops cracks, which can extend down to bone.  In advanced osteoarthritis the cartilage is worn away to reveal the underlying bone and nerve endings causing pain.  The bone hardens, cysts begin to form, and new cartilage cells laid down around " the worn cartilage ossify and form bony projections (bone spurs).  Untreated articular cartilage defects can progress to osteoarthritis with both mechanical and enzymatic breakdown resulting in permanent dysfunction of the joint. Our goal is to diagnose and prevent or halt the progression of arthritis      Many patients suffer with end-stage arthritis that limits even the simplest activities of daily living, significantly altering the patient's quality of fife. For these patient's, current cartilage surgical options DO NOT apply. There are no curative therapies for end-stage arthritis. A wide range of methods may be used to relieve pain and restore function. Conventional treatment methods include exercise, physical therapy and medications, such as anti-inflammatories. Recently, new biological compounds have been shown to be effective and safe for treating arthritis. These compounds include lubricants (hyaluronic acid) and building blocks of cartilage (Chondroitin Sulfate and Glucosamine).    Medications  Oral medications such as non-steroidal anti-inflammatories (NSAID's) tend to have a beneficial effect on the degenerative conditions described above. Immediately following an injury, these medications help to decrease pain and swelling. On a more long term nature, these medications help to relieve the pain and swelling associated with arthritic joints. Newer specific anti-inflammatories medications have been developed to block the enzymes necessary for production of inflammation (cyclooxygenase-2 or MCGHEE-2). These medications, such as Ceibrex or Bextra tend to have less adverse effects on the stomach and gastrointestinal tract than older, more traditional NSAID's. These prescription-strength NSAID medications are taken by mouth once or twice per day. Other non-prescription over-the-counter NSAID's are available.    Cartilage Supplements  Other oral medications which can be purchased without a prescription include  Glucosamine and Chondroitin Sulfate. These two compounds are normally found in the substance of articular cartilage. Several recent studies using Cosamin®DS have demonstrated a pain relieving effect with the combination of Glucosamine Hydrochloride, highly purified low molecular weight Chondroitin Sulfate and Manganese Ascorbate available only in this product. The National Institutes of Health ([facility]) has selected the exact same Glucosamine and Chondroitin Sulfate used in CosaminDS for a large multi-center clinical trial currently in progress.      Oral administration of these compounds does not have a cartilage building effect, but rather a cartilage sparing effect. Laboratory studies have confirmed a stimulatory action on cartilage cells as well as an inhibition of cartilage degeneration with CosaminDS, which can improve the health of existing cartilage. Few negative side-effects have been demonstrated in patients taking these compounds, and many patients with arthritis benefit from the addition of this supplement to their arthritis treatment regimen.    Cortisone (Steroid) Injections  Injection of steroids into joints have been performed for well over 100 years with good results. Typically, steroid injection is utilized in a patient with degenerative arthritic changes to treat acute inflammation.  Steroids are powerful anti-inflammatory substances. When injected into a joint, the steroid has primarily a local effect, not a whole-body or systemic effect. These medications tend to decrease pain and inflammation when used appropriately. If overused, local steroid injection can have a negative effect on the tissues, such as weakening of bone and tendons. These injections typically are not permanent in their beneficial effect.    Injectable Viscosupplementation  The space between the cartilage surfaces in the knee joint contains synovial fluid that acts as a lubricant for the joint. With the progression of arthritis,  "the synovial fluid becomes thinner and is ineffective as a shock absorber. As a result simple movements become painful. Hyaluronic acid injections supplement the existing synovial fluid and act as a shock absorber and lubricant for the knee.      Synvisc is a hyluronan based, naturally occurring lubricant with similar properties to synovial fluid found in healthy joints. Synvisc or Euflexxa are injected over a 3 week series to provide lubrication to the knee joint. For some patients, Synvisc One may be an option, this is a single-shot injection.    Braces  In some cases of arthritis, only a portion of the knee is degenerative and it may be advantageous to "unload" the affected area and force more weight towards the "good" part of the knee. Special braces called "unloaders" are used for this purpose. Typically the brace is worn for work or activity and tends to decrease the pain caused by single compartment arthritis.        Physical Therapy  Exercise is a vital component of the treatment of cartilage injury. If the knee becomes stiff after an injury or over the course of time, it may be difficult to regain the lost motion. In most cases, early range of motion exercises are instituted in order to prevent this problem. In some cases, a loss of strength in certain muscle groups can lead to increased stress on the already degenerative articular surfaces. If muscles are strong and working properly they will take up some of the stress and divert it away from the cartilage surfaces. As symptoms decrease exercise is increased, but always below the pain threshold. Muscle strength is never harmful to a joint. It is therefore common to have a doctor prescribe strengthening exercises as an integral part of the treatment program for arthritis.    TECHNIQUES  Biologic tissue engineering is continuing to bring exciting new approaches from the lab to the clinical arena. It may be possible to induce primitive cells from the bone " "marrow called pluripotential cells or mesenchymal stem cells to transform into hyaline articular cartilage with the use of a variety of growth factors or hormones. Genetic reprogram¬sandra and tissue engineering may eventually replace surgery - but not at this stage in the new millennium.  Other biological patches may act as a temporary home for chondrocytes or "prechondrocytes." This exciting field will offer many new surgical techniques, which will hopefully lead to opportunities to restore function with less invasive means.     "

## 2020-07-15 NOTE — LETTER
July 18, 2020      Cheryl Sun NP  [address]  [facility] [address]           [facility]  [address]  [facility] [address]  Phone: [phone]          Patient: Reji Pond Jr.   MR Number: [id number]   YOB: 1985   Date of Visit: 7/15/2020       Dear Cheryl Sun:    Thank you for referring Reji Pond to me for evaluation. Attached you will find relevant portions of my assessment and plan of care.    If you have questions, please do not hesitate to call me. I look forward to following Reji Pond along with you.    Sincerely,    Santo Shepherd MD    Enclosure  CC:  No Recipients    If you would like to receive this communication electronically, please contact [email or web address] or [phone] to request more information on [employer] Link access.    For providers and/or their staff who would like to refer a patient to [facility], please contact us through our one-stop-shop provider referral line, Georgina Willoughby, at [phone].    If you feel you have received this communication in error or would no longer like to receive these types of communications, please e-mail [email or web address]

## 2020-07-15 NOTE — LETTER
Patient: Reji Pond Jr.   YOB: 1985   Clinic Number: [id number]   Today's Date: July 15, 2020     To:    Fax Number:         Work Status Summary       Reji Pond Jr. is a 34 y.o. male is planning to undergo the following surgeries.  1. Left knee arthroscopic lateral meniscectomy  2. Left knee osteochondral allograft (Cartimax - lateral femoral condyle)  3. Left knee arthroscopic chondroplasty  4. Left knee arthroscopic synovectomy     Work Status: NOT ABLE to work at present. Estimated release to return to work in 4-5 months.    The diagnosis is consistent with his mechanism of injury. The left knee injury is consistent with the incident from 2 years ago and now requires surgery.     Medications Ordered This Encounter   Medications    celecoxib (CELEBREX) 200 MG capsule                 July 15, 2020    Santo Shepherd MD  Signed (Provider)

## 2020-07-15 NOTE — PROGRESS NOTES
Subjective:          Chief Complaint: Reji Pond Jr. is a 34 y.o. male who had concerns including Pain of the Left Knee.    Reji Pond Jr. is a 34 y.o. male presents today for evaluation of his left knee.  Patient sustained a injury where he fell into forced flexion on the left knee about 2 years ago in 2018 while working on a boat. He was trying to lift cargo and cover in the rain when he slipped on the deck and fell with the full body weight on his flexed knee.  He felt a pop at that time immediate swelling in the knee. He saw another doctor who diagnosed a sprain. He had not had an MRI at that time. He continued to have pain and swelling.  Since that time has had significant laterally based knee pain with mechanical popping sensations as well as stiffness and effusions in the knee.  He has seen multiple providers for this to have been unable to diagnosis issue.  His pain continues today and worsens over the last few months.  He is able to ambulate and work cover cannot run or be active.  He works on a boat doing manual labor.  He is otherwise healthy.  His pain is generalized over the knee but usually goes laterally.  He has painful popping sensations.          Review of Systems   Constitution: Negative.   HENT: Negative.    Eyes: Negative.    Cardiovascular: Negative.    Respiratory: Negative.    Endocrine: Negative.    Hematologic/Lymphatic: Negative.    Skin: Negative.    Musculoskeletal: Positive for joint pain.   Gastrointestinal: Negative.    Genitourinary: Negative.    Neurological: Negative.    Psychiatric/Behavioral: Negative.    Allergic/Immunologic: Negative.        Pain Related Questions  Over the past 3 days, what was your average pain during activity? (I.e. running, jogging, walking, climbing stairs, getting dressed, ect.): 7  Over the past 3 days, what was your highest pain level?: 10  Over the past 3 days, what was your lowest pain level? : 5    Other  How many nights a week are you  awakened by your affected body part?: 7  Was the patient's HEIGHT measured or patient reported?: Patient Reported  Was the patient's WEIGHT measured or patient reported?: Measured      Objective:        General: Reji is well-developed, well-nourished, appears stated age, in no acute distress, alert and oriented to time, place and person.     General    Vitals reviewed.  Constitutional: He is oriented to person, place, and time. He appears well-developed and well-nourished. No distress.   HENT:   Mouth/Throat: No oropharyngeal exudate.   Eyes: Right eye exhibits no discharge. Left eye exhibits no discharge.   Neck: Normal range of motion.   Pulmonary/Chest: Effort normal and breath sounds normal. No respiratory distress.   Neurological: He is alert and oriented to person, place, and time. He has normal reflexes. No cranial nerve deficit. Coordination normal.   Psychiatric: He has a normal mood and affect. His behavior is normal. Judgment and thought content normal.     General Musculoskeletal Exam   Gait: normal       Right Knee Exam     Inspection   Erythema: absent  Scars: absent  Swelling: absent  Effusion: absent  Deformity: absent  Bruising: absent    Tenderness   The patient is experiencing no tenderness.     Range of Motion   Extension: 0   Flexion: 140     Tests   Meniscus   Smitha:  Medial - negative Lateral - negative  Ligament Examination Lachman: normal (-1 to 2mm) PCL-Posterior Drawer: normal (0 to 2mm)     MCL - Valgus: normal (0 to 2mm)  LCL - Varus: normalPivot Shift: normal (Equal)Reverse Pivot Shift: normal (Equal)Dial Test at 30 degrees: normal (< 5 degrees)Dial Test at 90 degrees: normal (< 5 degrees)  Posterior Sag Test: negative  Posterolateral Corner: unstable (>15 degrees difference)  Patella   Patellar apprehension: negative  Passive Patellar Tilt: neutral  Patellar Tracking: normal  Patellar Glide (quadrants): Lateral - 1   Medial - 2  Q-Angle at 90 degrees: normal  Patellar Grind:  negative  J-Sign: none    Other   Meniscal Cyst: absent  Popliteal (Baker's) Cyst: absent  Sensation: normal    Left Knee Exam     Inspection   Erythema: absent  Scars: absent  Swelling: present  Effusion: absent  Deformity: absent  Bruising: absent    Tenderness   The patient tender to palpation of the lateral joint line.    Crepitus   The patient has crepitus of the lateral joint line.    Range of Motion   Extension: 0   Flexion: 110     Tests   Meniscus   Smitha:  Medial - negative Lateral - positive  Stability Lachman: normal (-1 to 2mm) PCL-Posterior Drawer: normal (0 to 2mm)  MCL - Valgus: normal (0 to 2mm)  LCL - Varus: normal (0 to 2mm)Pivot Shift: normal (Equal)Reverse Pivot Shift: normal (Equal)Dial Test at 30 degrees: normal (< 5 degrees)Dial Test at 90 degrees: normal (< 5 degrees)  Posterior Sag Test: negative  Posterolateral Corner: unstable (>15 degrees difference)  Patella   Patellar apprehension: negative  Passive Patellar Tilt: neutral  Patellar Tracking: normal  Patellar Glide (Quadrants): Lateral - 1 Medial - 2  Q-Angle at 90 degrees: normal  Patellar Grind: negative  J-Sign: J sign absent    Other   Meniscal Cyst: absent  Popliteal (Baker's) Cyst: absent  Sensation: normal    Right Hip Exam     Tests   Jase: negative  Left Hip Exam     Tests   Ajse: negative          Reflexes     Left Side  Quadriceps:  2+  Achilles:  2+    Right Side   Quadriceps:  2+  Achilles:  2+    Vascular Exam     Right Pulses  Dorsalis Pedis:      2+  Posterior Tibial:      2+        Left Pulses  Dorsalis Pedis:      2+  Posterior Tibial:      2+        XR KNEE ORTHO BILAT WITH FLEXION     CLINICAL HISTORY:  Pain in right knee     TECHNIQUE:  AP standing of both knees, PA flexion standing views of both knees, and Merchant views of both knees were performed.  Lateral views of both knees were also performed.     COMPARISON:  06/10/2020.     FINDINGS:  Right knee: No acute fracture or dislocation.  The joint spaces are  maintained.  No effusion.     Left knee: No acute fracture or dislocation. The joint spaces are maintained. No effusion.     Impression:     Unremarkable radiographs of the bilateral knees.        Electronically signed by: Twan Kasper  Date:                                            07/15/2020  Time:                                           14:02    Narrative & Impression     EXAMINATION:  MRI KNEE WITHOUT CONTRAST LEFT     CLINICAL HISTORY:  Meniscus tear suspected;Pain in left knee     TECHNIQUE:  Multiplanar, multisequence MRI of the left knee performed per routine protocol without intravenous contrast.     COMPARISON:  No prior MRI available for comparison.  Correlation is made to prior radiographs of the left knee from 06/10/2020.     FINDINGS:  Menisci: Medial meniscus and lateral meniscus are intact.     Ligaments: The anterior cruciate ligament, posterior cruciate ligament, medial collateral and lateral collateral ligamentous complex are intact.     Tendons: The quadriceps tendon is intact. The patellar tendon is intact.     Cartilage:     Patellofemoral: Minimal chondral fissuring of the median patellar ridge without associated marrow signal abnormality.  Trochlear cartilage is intact.     Medial tibiofemoral: Articular cartilage is maintained.     Lateral tibiofemoral: There is a full-thickness chondral fissuring of the central to posterior weight-bearing lateral femoral condyle with minimal associated subchondral marrow edema.     Bone: No fracture or marrow replacing process.     Joint fluid: There is no joint effusion.     Miscellaneous: Hoffa's fat pad is normal. There is no Baker's cyst.  Edema noted between the MCL and distal pes anserine tendons, perhaps reflecting mild pes anserine bursitis.     Impression:     1. Patellofemoral and lateral tibiofemoral chondral fissuring.  2. Edema between MCL and distal pes anserine tendons, perhaps reflecting mild pes anserine bursitis.              Assessment:       Encounter Diagnoses   Name Primary?    Right knee pain, unspecified chronicity Yes    Acute internal derangement of left knee     Chondromalacia of left knee     Knee stiffness, left     Chronic pain of left knee           Plan:       1. IKDC, SF-12 and KOOS was filled out today in clinic.     RTCfor preop appointment with midlevel provider. Patient will not fill out IKDC, SF-12 and KOOS on return.     2. We reviewed with Reji today, the pathology and natural history of his diagnosis. We have discussed a variety of treatment options including medications, physical therapy and other alternative treatments. I also explained the indications, risks and benefits of surgery. After discussion, Reji decided to proceed with surgery. The decision was made to go forward with     1. Left knee arthroscopic lateral meniscectomy  2. Left knee osteochondral allograft (Cartimax - lateral femoral condyle)  3. Left knee arthroscopic chondroplasty  4. Left knee arthroscopic synovectomy     The details of the surgical procedure were explained, including the location of probable incisions and a description of likely hardware and/or grafts to be used.  The patient understands the likely convalescence after surgery.  Also, we have thoroughly discussed the risks, benefits and alternatives to surgery, including, but not limited to, the risk of infection, joint stiffness, blood clot (including DVT and/or pulmonary embolus), neurologic and vascular injury.  It was explained that, if tissue has been repaired or reconstructed, there is a chance of failure, which may require further management.    3. [id number] Eliot Camacho, performed a custom orthotic / brace adjustment, fitting and training with the patient. The patient demonstrated understanding and proper care. This was performed for 15 minutes. Lateral  brace    The diagnosis is consistent with his mechanism of injury. The left knee injury is consistent with the  incident from 2 years ago and now requires surgery.     All of the patient's questions were answered and informed consent was obtained. The patient will contact us if they have any questions or concerns in the interim.                   [facility] patient questionnaires have been collected today.

## 2020-07-21 DIAGNOSIS — M23.201 OLD TEAR OF LATERAL MENISCUS OF LEFT KNEE, UNSPECIFIED TEAR TYPE: Primary | ICD-10-CM

## 2020-07-21 DIAGNOSIS — M22.42 CHONDROMALACIA OF LEFT PATELLA: ICD-10-CM

## 2020-07-21 DIAGNOSIS — M94.262 CHONDROMALACIA OF LEFT KNEE: ICD-10-CM

## 2020-07-22 ENCOUNTER — TELEPHONE (OUTPATIENT)
Dept: SPORTS MEDICINE | Facility: CLINIC | Age: 35
End: 2020-07-22

## 2020-07-23 ENCOUNTER — OFFICE VISIT (OUTPATIENT)
Dept: SPORTS MEDICINE | Facility: CLINIC | Age: 35
End: 2020-07-23
Payer: COMMERCIAL

## 2020-07-23 VITALS
HEART RATE: 71 BPM | DIASTOLIC BLOOD PRESSURE: 95 MMHG | BODY MASS INDEX: 30.75 KG/M2 | HEIGHT: 72 IN | WEIGHT: 227 LBS | SYSTOLIC BLOOD PRESSURE: 153 MMHG

## 2020-07-23 DIAGNOSIS — M94.262 CHONDROMALACIA OF LEFT KNEE: Primary | ICD-10-CM

## 2020-07-23 DIAGNOSIS — S83.282D OTHER TEAR OF LATERAL MENISCUS OF LEFT KNEE AS CURRENT INJURY, SUBSEQUENT ENCOUNTER: ICD-10-CM

## 2020-07-23 DIAGNOSIS — M22.42 CHONDROMALACIA, PATELLA, LEFT: ICD-10-CM

## 2020-07-23 PROCEDURE — 99499 NO LOS: ICD-10-PCS | Mod: S$GLB,,, | Performed by: PHYSICIAN ASSISTANT

## 2020-07-23 PROCEDURE — 99999 PR PBB SHADOW E&M-EST. PATIENT-LVL III: CPT | Mod: PBBFAC,,, | Performed by: PHYSICIAN ASSISTANT

## 2020-07-23 PROCEDURE — 99999 PR PBB SHADOW E&M-EST. PATIENT-LVL III: ICD-10-PCS | Mod: PBBFAC,,, | Performed by: PHYSICIAN ASSISTANT

## 2020-07-23 PROCEDURE — 99499 UNLISTED E&M SERVICE: CPT | Mod: S$GLB,,, | Performed by: PHYSICIAN ASSISTANT

## 2020-07-23 RX ORDER — PROMETHAZINE HYDROCHLORIDE 25 MG/1
25 TABLET ORAL EVERY 6 HOURS PRN
Qty: 30 TABLET | Refills: 0 | Status: SHIPPED | OUTPATIENT
Start: 2020-07-23 | End: 2020-08-12 | Stop reason: SDUPTHER

## 2020-07-23 RX ORDER — ASPIRIN 325 MG
325 TABLET, DELAYED RELEASE (ENTERIC COATED) ORAL DAILY
Qty: 42 TABLET | Refills: 0 | Status: SHIPPED | OUTPATIENT
Start: 2020-07-23 | End: 2021-07-19 | Stop reason: ALTCHOICE

## 2020-07-23 RX ORDER — CELECOXIB 200 MG/1
200 CAPSULE ORAL 2 TIMES DAILY
Qty: 60 CAPSULE | Refills: 2 | Status: SHIPPED | OUTPATIENT
Start: 2020-07-23 | End: 2022-06-06 | Stop reason: SDUPTHER

## 2020-07-23 RX ORDER — OXYCODONE AND ACETAMINOPHEN 10; 325 MG/1; MG/1
1 TABLET ORAL EVERY 6 HOURS PRN
Qty: 28 TABLET | Refills: 0 | Status: SHIPPED | OUTPATIENT
Start: 2020-07-23 | End: 2020-08-04 | Stop reason: SDUPTHER

## 2020-07-23 RX ORDER — ROPIVACAINE/EPI/CLONIDINE/KET 2.46-0.005
SYRINGE (ML) INJECTION ONCE
Status: CANCELLED | OUTPATIENT
Start: 2020-07-23 | End: 2020-07-23

## 2020-07-23 RX ORDER — SODIUM CHLORIDE 9 MG/ML
INJECTION, SOLUTION INTRAVENOUS CONTINUOUS
Status: CANCELLED | OUTPATIENT
Start: 2020-07-23

## 2020-07-23 RX ORDER — MUPIROCIN 20 MG/G
OINTMENT TOPICAL
Status: CANCELLED | OUTPATIENT
Start: 2020-07-23

## 2020-07-23 NOTE — H&P (VIEW-ONLY)
Reji Pond Jr.  is here for a completion of his perioperative paperwork. he  Is scheduled to undergo  1. Left knee arthroscopic lateral meniscectomy  2. Left knee osteochondral allograft (Cartimax - lateral femoral condyle)  3. Left knee arthroscopic chondroplasty  4. Left knee arthroscopic synovectomy  on 7/28/20.  He is a healthy individual and does not need clearance for this procedure.     MEASURED FOR A LATERAL  TODAY    Risks, indications and benefits of the surgical procedure were discussed with the patient. All questions with regard to surgery, rehab, expected return to functional activities, activities of daily living and recreational endeavors were answered to his satisfaction.    Patient was informed and understands the risks of surgery are greater for patients with a current condition or history of heart disease, obesity, clotting disorders, recurrent infections, steroid use, current or past smoking, and factors such as sedentary lifestyle and noncompliance with medications, therapy or follow-up. The degree of the increased risk is hard to estimate with any degree of precision.    Once no other questions were asked, a brief history and physical exam was then performed.    PAST MEDICAL HISTORY:   Past Medical History:   Diagnosis Date    Elevated liver function tests 10/25/2018    Fatty liver 10/25/2018    Hypertension     Kidney stones      PAST SURGICAL HISTORY: No past surgical history on file.  FAMILY HISTORY:   Family History   Problem Relation Age of Onset    Heart failure Father      SOCIAL HISTORY:   Social History     Socioeconomic History    Marital status: Single     Spouse name: Not on file    Number of children: Not on file    Years of education: Not on file    Highest education level: Not on file   Occupational History    Not on file   Social Needs    Financial resource strain: Not on file    Food insecurity     Worry: Not on file     Inability: Not on file     Transportation needs     Medical: Not on file     Non-medical: Not on file   Tobacco Use    Smoking status: Former Smoker    Smokeless tobacco: Never Used   Substance and Sexual Activity    Alcohol use: Yes     Frequency: 2-3 times a week     Drinks per session: 3 or 4     Binge frequency: Never     Comment: occasionally     Drug use: No    Sexual activity: Not on file   Lifestyle    Physical activity     Days per week: Not on file     Minutes per session: Not on file    Stress: Not on file   Relationships    Social connections     Talks on phone: Not on file     Gets together: Not on file     Attends [employer] service: Not on file     Active member of club or organization: Not on file     Attends meetings of clubs or organizations: Not on file     Relationship status: Not on file   Other Topics Concern    Not on file   Social History Narrative    Not on file       MEDICATIONS:   Current Outpatient Medications:     celecoxib (CELEBREX) 200 MG capsule, Take 1 capsule (200 mg total) by mouth 2 (two) times daily., Disp: 60 capsule, Rfl: 6    albuterol (PROVENTIL/VENTOLIN HFA) 90 mcg/actuation inhaler, Inhale 1-2 puffs into the lungs every 6 (six) hours as needed. Rescue, Disp: 1 Inhaler, Rfl: 0    diclofenac (VOLTAREN) 75 MG EC tablet, Take 1 tablet (75 mg total) by mouth 2 (two) times daily., Disp: 60 tablet, Rfl: 0    meclizine (ANTIVERT) 25 mg tablet, Take 1 tablet (25 mg total) by mouth 3 (three) times daily as needed., Disp: 20 tablet, Rfl: 0  ALLERGIES: Review of patient's allergies indicates:  No Known Allergies    Review of Systems   Constitution: Negative. Negative for chills, fever and night sweats.   HENT: Negative for congestion and headaches.    Eyes: Negative for blurred vision, left vision loss and right vision loss.   Cardiovascular: Negative for chest pain and syncope.   Respiratory: Negative for cough and shortness of breath.    Endocrine: Negative for polydipsia, polyphagia and  polyuria.   Hematologic/Lymphatic: Negative for bleeding problem. Does not bruise/bleed easily.   Skin: Negative for dry skin, itching and rash.   Musculoskeletal: Negative for falls and muscle weakness.   Gastrointestinal: Negative for abdominal pain and bowel incontinence.   Genitourinary: Negative for bladder incontinence and nocturia.   Neurological: Negative for disturbances in coordination, loss of balance and seizures.   Psychiatric/Behavioral: Negative for depression. The patient does not have insomnia.    Allergic/Immunologic: Negative for hives and persistent infections.     PHYSICAL EXAM:  GEN: A&Ox3, WD WN NAD  HEENT: WNL  CHEST: CTAB, no W/R/R  HEART: RRR, no M/R/G   ABD: Soft, NT ND, BS x4 QUADS  MS: Refer to previous note for detailed MS exam  NEURO: CN II-XII intact       The surgical consent was then reviewed with the patient, who agreed with all the contents of the consent form and it was signed. he was then given the [facility] surgery packet to bring with him to [facility] for the anesthesia portion of his perioperative paperwork.     PHYSICAL THERAPY:  He was also instructed regarding physical therapy and will begin POD # 1-3 at [facility].    POST OP CARE:instructions were reviewed including care of the wound and dressing after surgery and when he can shower.     PAIN MANAGEMENT: Reji Pond Jr. was also given a pain management regime, which includes the TENS unit given to him by Rosemarie Still along with the education required for its use. He was also instructed regarding the Polar ice unit that will be in place after surgery and his postoperative pain medications.     PAIN MEDICATION:  Percocet 10/325mg 1 po q 4-6 hours prn pain  Phenergan 25 mg one p.o. q.4-6 hours p.r.n. nausea and vomiting.  Celebrex 200 mg BID  ASA 325mg once a day x 6 weeks post op    All medication will be delivered bedside at [place]    Patient denies history of seizures.     Patient was instructed to buy and  take:  Aspirin 325mg daily x 6 weeks for DVT prophylaxis starting on the evening after surgery.  Patient will also use bilateral TEDs on lower extremities, SCDs during surgery, and early ambulation post-op. If the patient was previously taking 81mg baby aspirin, they were told to not take it will using the above stated aspirin and to restart the 81mg aspirin after completion of the aspirin dose.       Patient was also told to buy over the counter Prilosec medication and take it once daily for GI protection as long as they are taking NSAIDs or Aspirin.    DVT prophylaxis was discussed with the patient today including risk factors for developing DVTs and history of DVTs. The patient was asked if any specific recommendations were given from the doctor/s that did pre-operative surgical clearance.      The patient was told that narcotic pain medications may make them drowsy and instructions were given to not sign legal documents, drive or operate heavy machinery, cars, or equipment while under the influence of narcotic medications.     As there were no other questions to be asked, he was given my business card along with Santo Shepherd MD business card if he has any questions or concerns prior to surgery or in the postop period.

## 2020-07-23 NOTE — H&P
Reji Pond Jr.  is here for a completion of his perioperative paperwork. he  Is scheduled to undergo  1. Left knee arthroscopic lateral meniscectomy  2. Left knee osteochondral allograft (Cartimax - lateral femoral condyle)  3. Left knee arthroscopic chondroplasty  4. Left knee arthroscopic synovectomy  on 7/28/20.  He is a healthy individual and does not need clearance for this procedure.     MEASURED FOR A LATERAL  TODAY    Risks, indications and benefits of the surgical procedure were discussed with the patient. All questions with regard to surgery, rehab, expected return to functional activities, activities of daily living and recreational endeavors were answered to his satisfaction.    Patient was informed and understands the risks of surgery are greater for patients with a current condition or history of heart disease, obesity, clotting disorders, recurrent infections, steroid use, current or past smoking, and factors such as sedentary lifestyle and noncompliance with medications, therapy or follow-up. The degree of the increased risk is hard to estimate with any degree of precision.    Once no other questions were asked, a brief history and physical exam was then performed.    PAST MEDICAL HISTORY:   Past Medical History:   Diagnosis Date    Elevated liver function tests 10/25/2018    Fatty liver 10/25/2018    Hypertension     Kidney stones      PAST SURGICAL HISTORY: No past surgical history on file.  FAMILY HISTORY:   Family History   Problem Relation Age of Onset    Heart failure Father      SOCIAL HISTORY:   Social History     Socioeconomic History    Marital status: Single     Spouse name: Not on file    Number of children: Not on file    Years of education: Not on file    Highest education level: Not on file   Occupational History    Not on file   Social Needs    Financial resource strain: Not on file    Food insecurity     Worry: Not on file     Inability: Not on file     Transportation needs     Medical: Not on file     Non-medical: Not on file   Tobacco Use    Smoking status: Former Smoker    Smokeless tobacco: Never Used   Substance and Sexual Activity    Alcohol use: Yes     Frequency: 2-3 times a week     Drinks per session: 3 or 4     Binge frequency: Never     Comment: occasionally     Drug use: No    Sexual activity: Not on file   Lifestyle    Physical activity     Days per week: Not on file     Minutes per session: Not on file    Stress: Not on file   Relationships    Social connections     Talks on phone: Not on file     Gets together: Not on file     Attends Sikhism service: Not on file     Active member of club or organization: Not on file     Attends meetings of clubs or organizations: Not on file     Relationship status: Not on file   Other Topics Concern    Not on file   Social History Narrative    Not on file       MEDICATIONS:   Current Outpatient Medications:     celecoxib (CELEBREX) 200 MG capsule, Take 1 capsule (200 mg total) by mouth 2 (two) times daily., Disp: 60 capsule, Rfl: 6    albuterol (PROVENTIL/VENTOLIN HFA) 90 mcg/actuation inhaler, Inhale 1-2 puffs into the lungs every 6 (six) hours as needed. Rescue, Disp: 1 Inhaler, Rfl: 0    diclofenac (VOLTAREN) 75 MG EC tablet, Take 1 tablet (75 mg total) by mouth 2 (two) times daily., Disp: 60 tablet, Rfl: 0    meclizine (ANTIVERT) 25 mg tablet, Take 1 tablet (25 mg total) by mouth 3 (three) times daily as needed., Disp: 20 tablet, Rfl: 0  ALLERGIES: Review of patient's allergies indicates:  No Known Allergies    Review of Systems   Constitution: Negative. Negative for chills, fever and night sweats.   HENT: Negative for congestion and headaches.    Eyes: Negative for blurred vision, left vision loss and right vision loss.   Cardiovascular: Negative for chest pain and syncope.   Respiratory: Negative for cough and shortness of breath.    Endocrine: Negative for polydipsia, polyphagia and  polyuria.   Hematologic/Lymphatic: Negative for bleeding problem. Does not bruise/bleed easily.   Skin: Negative for dry skin, itching and rash.   Musculoskeletal: Negative for falls and muscle weakness.   Gastrointestinal: Negative for abdominal pain and bowel incontinence.   Genitourinary: Negative for bladder incontinence and nocturia.   Neurological: Negative for disturbances in coordination, loss of balance and seizures.   Psychiatric/Behavioral: Negative for depression. The patient does not have insomnia.    Allergic/Immunologic: Negative for hives and persistent infections.     PHYSICAL EXAM:  GEN: A&Ox3, WD WN NAD  HEENT: WNL  CHEST: CTAB, no W/R/R  HEART: RRR, no M/R/G   ABD: Soft, NT ND, BS x4 QUADS  MS: Refer to previous note for detailed MS exam  NEURO: CN II-XII intact       The surgical consent was then reviewed with the patient, who agreed with all the contents of the consent form and it was signed. he was then given the [facility] surgery packet to bring with him to [facility] for the anesthesia portion of his perioperative paperwork.     PHYSICAL THERAPY:  He was also instructed regarding physical therapy and will begin POD # 1-3 at [facility].    POST OP CARE:instructions were reviewed including care of the wound and dressing after surgery and when he can shower.     PAIN MANAGEMENT: Reji Pond Jr. was also given a pain management regime, which includes the TENS unit given to him by Rosemarie Still along with the education required for its use. He was also instructed regarding the Polar ice unit that will be in place after surgery and his postoperative pain medications.     PAIN MEDICATION:  Percocet 10/325mg 1 po q 4-6 hours prn pain  Phenergan 25 mg one p.o. q.4-6 hours p.r.n. nausea and vomiting.  Celebrex 200 mg BID  ASA 325mg once a day x 6 weeks post op    All medication will be delivered bedside at [place]    Patient denies history of seizures.     Patient was instructed to buy and  take:  Aspirin 325mg daily x 6 weeks for DVT prophylaxis starting on the evening after surgery.  Patient will also use bilateral TEDs on lower extremities, SCDs during surgery, and early ambulation post-op. If the patient was previously taking 81mg baby aspirin, they were told to not take it will using the above stated aspirin and to restart the 81mg aspirin after completion of the aspirin dose.       Patient was also told to buy over the counter Prilosec medication and take it once daily for GI protection as long as they are taking NSAIDs or Aspirin.    DVT prophylaxis was discussed with the patient today including risk factors for developing DVTs and history of DVTs. The patient was asked if any specific recommendations were given from the doctor/s that did pre-operative surgical clearance.      The patient was told that narcotic pain medications may make them drowsy and instructions were given to not sign legal documents, drive or operate heavy machinery, cars, or equipment while under the influence of narcotic medications.     As there were no other questions to be asked, he was given my business card along with Santo Shepherd MD business card if he has any questions or concerns prior to surgery or in the postop period.

## 2020-07-25 ENCOUNTER — LAB VISIT (OUTPATIENT)
Dept: SPORTS MEDICINE | Facility: CLINIC | Age: 35
End: 2020-07-25
Payer: COMMERCIAL

## 2020-07-25 DIAGNOSIS — Z01.818 PREOPERATIVE TESTING: ICD-10-CM

## 2020-07-25 PROCEDURE — U0003 INFECTIOUS AGENT DETECTION BY NUCLEIC ACID (DNA OR RNA); SEVERE ACUTE RESPIRATORY SYNDROME CORONAVIRUS 2 (SARS-COV-2) (CORONAVIRUS DISEASE [COVID-19]), AMPLIFIED PROBE TECHNIQUE, MAKING USE OF HIGH THROUGHPUT TECHNOLOGIES AS DESCRIBED BY CMS-2020-01-R: HCPCS

## 2020-07-26 LAB — SARS-COV-2 RNA RESP QL NAA+PROBE: NOT DETECTED

## 2020-07-27 ENCOUNTER — TELEPHONE (OUTPATIENT)
Dept: SPORTS MEDICINE | Facility: CLINIC | Age: 35
End: 2020-07-27

## 2020-07-27 ENCOUNTER — ANESTHESIA EVENT (OUTPATIENT)
Dept: SURGERY | Facility: HOSPITAL | Age: 35
End: 2020-07-27
Payer: COMMERCIAL

## 2020-07-27 NOTE — PLAN OF CARE
Contacted patient to review the Covid-19 Procedure/Surgery Confirmation questionnaire.  Patient receptive to questions and all information provided on instructions concerning our temporary temperature, hand washing/sanitizing and visitor policy as per CDC recommendations. One visitor will be allowed into the hospital at this time.  Patient will be dropped off and picked up at the same location.  We will receive permission to text that person with updates,(if patient wishes to do so) as well as when the patient is ready for discharge.  Phone number [phone] provided for patients to call if they were to develop fever, cough or SOB prior to surgery. Instructed to take temperature the night before and the morning of surgery. Travel questions as per travel questionnaire reviewed. Our cleaning protocols for every surgery and every procedure were reviewed, and reassurance provided that safety, as well as following CDC guidelines, are our top priority. Voiced understanding.    time for surgery for tomorrow  Given per clinic at [facility], [address], 1st floor [facility]. Instructions given on food/fluid intake, medications, COVID info and visitor policy.  Voiced understanding    Patient is a non smoker

## 2020-07-27 NOTE — TELEPHONE ENCOUNTER
Spoke to patient to notify him of his arrival time. The requested arrival time is 8:30am at the [facility].

## 2020-07-28 ENCOUNTER — HOSPITAL ENCOUNTER (EMERGENCY)
Facility: HOSPITAL | Age: 35
Discharge: HOME | End: 2020-07-29
Attending: EMERGENCY MEDICINE
Payer: COMMERCIAL

## 2020-07-28 ENCOUNTER — ANESTHESIA (OUTPATIENT)
Dept: SURGERY | Facility: HOSPITAL | Age: 35
End: 2020-07-28
Payer: COMMERCIAL

## 2020-07-28 ENCOUNTER — HOSPITAL ENCOUNTER (OUTPATIENT)
Facility: HOSPITAL | Age: 35
Discharge: HOME | End: 2020-07-28
Attending: ORTHOPAEDIC SURGERY | Admitting: ORTHOPAEDIC SURGERY
Payer: COMMERCIAL

## 2020-07-28 VITALS
OXYGEN SATURATION: 100 % | HEIGHT: 72 IN | SYSTOLIC BLOOD PRESSURE: 137 MMHG | DIASTOLIC BLOOD PRESSURE: 73 MMHG | RESPIRATION RATE: 18 BRPM | BODY MASS INDEX: 30.75 KG/M2 | WEIGHT: 227 LBS | HEART RATE: 73 BPM | TEMPERATURE: 99 F

## 2020-07-28 DIAGNOSIS — K13.79 UVULAR EDEMA: ICD-10-CM

## 2020-07-28 DIAGNOSIS — M94.262 CHONDROMALACIA OF LEFT KNEE: Primary | ICD-10-CM

## 2020-07-28 DIAGNOSIS — M22.42 CHONDROMALACIA, PATELLA, LEFT: ICD-10-CM

## 2020-07-28 DIAGNOSIS — M94.262 CHONDROMALACIA OF LEFT KNEE: ICD-10-CM

## 2020-07-28 DIAGNOSIS — S83.282D OTHER TEAR OF LATERAL MENISCUS OF LEFT KNEE AS CURRENT INJURY, SUBSEQUENT ENCOUNTER: ICD-10-CM

## 2020-07-28 DIAGNOSIS — R22.0 LIP SWELLING: Primary | ICD-10-CM

## 2020-07-28 PROCEDURE — 36000710: Performed by: ORTHOPAEDIC SURGERY

## 2020-07-28 PROCEDURE — 99284 EMERGENCY DEPT VISIT MOD MDM: CPT | Mod: 25

## 2020-07-28 PROCEDURE — 29881 PR KNEE SCOPE SINGLE MENISECECTOMY: ICD-10-PCS | Mod: LT,,, | Performed by: ORTHOPAEDIC SURGERY

## 2020-07-28 PROCEDURE — 25000003 PHARM REV CODE 250: Performed by: ANESTHESIOLOGY

## 2020-07-28 PROCEDURE — 63600175 PHARM REV CODE 636 W HCPCS: Performed by: ANESTHESIOLOGY

## 2020-07-28 PROCEDURE — 37000009 HC ANESTHESIA EA ADD 15 MINS: Performed by: ORTHOPAEDIC SURGERY

## 2020-07-28 PROCEDURE — 63600175 PHARM REV CODE 636 W HCPCS: Performed by: EMERGENCY MEDICINE

## 2020-07-28 PROCEDURE — 64448 PR NERVE BLOCK INJ, ANES/STEROID, FEMORAL, CONT INFUSION, INCL IMAG GUIDANCE: ICD-10-PCS | Mod: 59,LT,, | Performed by: ANESTHESIOLOGY

## 2020-07-28 PROCEDURE — 71000033 HC RECOVERY, INTIAL HOUR: Performed by: ORTHOPAEDIC SURGERY

## 2020-07-28 PROCEDURE — 71000039 HC RECOVERY, EACH ADD'L HOUR: Performed by: ORTHOPAEDIC SURGERY

## 2020-07-28 PROCEDURE — 94761 N-INVAS EAR/PLS OXIMETRY MLT: CPT

## 2020-07-28 PROCEDURE — 37000008 HC ANESTHESIA 1ST 15 MINUTES: Performed by: ORTHOPAEDIC SURGERY

## 2020-07-28 PROCEDURE — 71000015 HC POSTOP RECOV 1ST HR: Performed by: ORTHOPAEDIC SURGERY

## 2020-07-28 PROCEDURE — C1751 CATH, INF, PER/CENT/MIDLINE: HCPCS | Performed by: ANESTHESIOLOGY

## 2020-07-28 PROCEDURE — D9220A PRA ANESTHESIA: ICD-10-PCS | Mod: CRNA,,, | Performed by: NURSE ANESTHETIST, CERTIFIED REGISTERED

## 2020-07-28 PROCEDURE — 25000003 PHARM REV CODE 250: Performed by: ORTHOPAEDIC SURGERY

## 2020-07-28 PROCEDURE — 25000003 PHARM REV CODE 250: Performed by: EMERGENCY MEDICINE

## 2020-07-28 PROCEDURE — 27800903 OPTIME MED/SURG SUP & DEVICES OTHER IMPLANTS: Performed by: ORTHOPAEDIC SURGERY

## 2020-07-28 PROCEDURE — D9220A PRA ANESTHESIA: ICD-10-PCS | Mod: ANES,,, | Performed by: ANESTHESIOLOGY

## 2020-07-28 PROCEDURE — D9220A PRA ANESTHESIA: Mod: ANES,,, | Performed by: ANESTHESIOLOGY

## 2020-07-28 PROCEDURE — C1713 ANCHOR/SCREW BN/BN,TIS/BN: HCPCS | Performed by: ORTHOPAEDIC SURGERY

## 2020-07-28 PROCEDURE — D9220A PRA ANESTHESIA: Mod: CRNA,,, | Performed by: NURSE ANESTHETIST, CERTIFIED REGISTERED

## 2020-07-28 PROCEDURE — 63600175 PHARM REV CODE 636 W HCPCS: Performed by: NURSE ANESTHETIST, CERTIFIED REGISTERED

## 2020-07-28 PROCEDURE — 63600175 PHARM REV CODE 636 W HCPCS: Performed by: PHYSICIAN ASSISTANT

## 2020-07-28 PROCEDURE — 25000003 PHARM REV CODE 250: Performed by: NURSE ANESTHETIST, CERTIFIED REGISTERED

## 2020-07-28 PROCEDURE — 94770 HC EXHALED C02 TEST: CPT

## 2020-07-28 PROCEDURE — 64448 NJX AA&/STRD FEM NRV NFS IMG: CPT | Mod: 59,LT,, | Performed by: ANESTHESIOLOGY

## 2020-07-28 PROCEDURE — 96374 THER/PROPH/DIAG INJ IV PUSH: CPT

## 2020-07-28 PROCEDURE — 99284 EMERGENCY DEPT VISIT MOD MDM: CPT | Mod: ,,, | Performed by: EMERGENCY MEDICINE

## 2020-07-28 PROCEDURE — 99284 PR EMERGENCY DEPT VISIT,LEVEL IV: ICD-10-PCS | Mod: ,,, | Performed by: EMERGENCY MEDICINE

## 2020-07-28 PROCEDURE — 76942 ECHO GUIDE FOR BIOPSY: CPT | Performed by: ANESTHESIOLOGY

## 2020-07-28 PROCEDURE — S0028 INJECTION, FAMOTIDINE, 20 MG: HCPCS | Performed by: EMERGENCY MEDICINE

## 2020-07-28 PROCEDURE — 25000003 PHARM REV CODE 250: Performed by: PHYSICIAN ASSISTANT

## 2020-07-28 PROCEDURE — 27201423 OPTIME MED/SURG SUP & DEVICES STERILE SUPPLY: Performed by: ORTHOPAEDIC SURGERY

## 2020-07-28 PROCEDURE — E0781 EXTERNAL AMBULATORY INFUS PU: HCPCS | Performed by: ANESTHESIOLOGY

## 2020-07-28 PROCEDURE — 29881 ARTHRS KNE SRG MNISECTMY M/L: CPT | Mod: LT,,, | Performed by: ORTHOPAEDIC SURGERY

## 2020-07-28 PROCEDURE — 76942 ECHO GUIDE FOR BIOPSY: CPT | Mod: 26,,, | Performed by: ANESTHESIOLOGY

## 2020-07-28 PROCEDURE — 27200651 HC AIRWAY, LMA: Performed by: ANESTHESIOLOGY

## 2020-07-28 PROCEDURE — 99900035 HC TECH TIME PER 15 MIN (STAT)

## 2020-07-28 PROCEDURE — 36000711: Performed by: ORTHOPAEDIC SURGERY

## 2020-07-28 PROCEDURE — 64448 NJX AA&/STRD FEM NRV NFS IMG: CPT | Performed by: ANESTHESIOLOGY

## 2020-07-28 PROCEDURE — 96375 TX/PRO/DX INJ NEW DRUG ADDON: CPT

## 2020-07-28 PROCEDURE — 76942 PR U/S GUIDANCE FOR NEEDLE GUIDANCE: ICD-10-PCS | Mod: 26,,, | Performed by: ANESTHESIOLOGY

## 2020-07-28 PROCEDURE — S0028 INJECTION, FAMOTIDINE, 20 MG: HCPCS | Performed by: NURSE ANESTHETIST, CERTIFIED REGISTERED

## 2020-07-28 PROCEDURE — 63600175 PHARM REV CODE 636 W HCPCS: Performed by: ORTHOPAEDIC SURGERY

## 2020-07-28 DEVICE — ALLOGRAFT CARTIMAX VIABLE CART: Type: IMPLANTABLE DEVICE | Site: KNEE | Status: FUNCTIONAL

## 2020-07-28 RX ORDER — ONDANSETRON 2 MG/ML
INJECTION INTRAMUSCULAR; INTRAVENOUS
Status: DISCONTINUED | OUTPATIENT
Start: 2020-07-28 | End: 2020-07-28

## 2020-07-28 RX ORDER — ROPIVACAINE HYDROCHLORIDE 2 MG/ML
10 INJECTION, SOLUTION EPIDURAL; INFILTRATION; PERINEURAL CONTINUOUS
Status: DISCONTINUED | OUTPATIENT
Start: 2020-07-28 | End: 2020-07-28 | Stop reason: HOSPADM

## 2020-07-28 RX ORDER — KETAMINE HCL IN 0.9 % NACL 50 MG/5 ML
SYRINGE (ML) INTRAVENOUS
Status: DISCONTINUED | OUTPATIENT
Start: 2020-07-28 | End: 2020-07-28

## 2020-07-28 RX ORDER — DEXAMETHASONE SODIUM PHOSPHATE 4 MG/ML
INJECTION, SOLUTION INTRA-ARTICULAR; INTRALESIONAL; INTRAMUSCULAR; INTRAVENOUS; SOFT TISSUE
Status: DISCONTINUED | OUTPATIENT
Start: 2020-07-28 | End: 2020-07-28

## 2020-07-28 RX ORDER — PROPOFOL 10 MG/ML
VIAL (ML) INTRAVENOUS
Status: DISCONTINUED | OUTPATIENT
Start: 2020-07-28 | End: 2020-07-28

## 2020-07-28 RX ORDER — PROMETHAZINE HYDROCHLORIDE 25 MG/1
25 TABLET ORAL EVERY 6 HOURS PRN
Status: DISCONTINUED | OUTPATIENT
Start: 2020-07-28 | End: 2020-07-28 | Stop reason: HOSPADM

## 2020-07-28 RX ORDER — MORPHINE SULFATE 2 MG/ML
2 INJECTION, SOLUTION INTRAMUSCULAR; INTRAVENOUS EVERY 10 MIN PRN
Status: DISCONTINUED | OUTPATIENT
Start: 2020-07-28 | End: 2020-07-28 | Stop reason: HOSPADM

## 2020-07-28 RX ORDER — CEFAZOLIN SODIUM 1 G/3ML
2 INJECTION, POWDER, FOR SOLUTION INTRAMUSCULAR; INTRAVENOUS
Status: DISCONTINUED | OUTPATIENT
Start: 2020-07-28 | End: 2020-07-28 | Stop reason: HOSPADM

## 2020-07-28 RX ORDER — SODIUM CHLORIDE 0.9 % (FLUSH) 0.9 %
3 SYRINGE (ML) INJECTION EVERY 6 HOURS
Status: DISCONTINUED | OUTPATIENT
Start: 2020-07-28 | End: 2020-07-28 | Stop reason: HOSPADM

## 2020-07-28 RX ORDER — MORPHINE SULFATE 4 MG/ML
4 INJECTION, SOLUTION INTRAMUSCULAR; INTRAVENOUS
Status: COMPLETED | OUTPATIENT
Start: 2020-07-28 | End: 2020-07-28

## 2020-07-28 RX ORDER — ONDANSETRON 2 MG/ML
4 INJECTION INTRAMUSCULAR; INTRAVENOUS EVERY 12 HOURS PRN
Status: DISCONTINUED | OUTPATIENT
Start: 2020-07-28 | End: 2020-07-28 | Stop reason: HOSPADM

## 2020-07-28 RX ORDER — FENTANYL CITRATE 50 UG/ML
25 INJECTION, SOLUTION INTRAMUSCULAR; INTRAVENOUS EVERY 5 MIN PRN
Status: DISCONTINUED | OUTPATIENT
Start: 2020-07-28 | End: 2020-07-28 | Stop reason: HOSPADM

## 2020-07-28 RX ORDER — METHYLPREDNISOLONE SOD SUCC 125 MG
125 VIAL (EA) INJECTION
Status: COMPLETED | OUTPATIENT
Start: 2020-07-28 | End: 2020-07-28

## 2020-07-28 RX ORDER — ACETAMINOPHEN 500 MG
1000 TABLET ORAL
Status: COMPLETED | OUTPATIENT
Start: 2020-07-28 | End: 2020-07-28

## 2020-07-28 RX ORDER — TRAMADOL HYDROCHLORIDE 50 MG/1
100 TABLET ORAL EVERY 6 HOURS PRN
Status: DISCONTINUED | OUTPATIENT
Start: 2020-07-28 | End: 2020-07-28 | Stop reason: HOSPADM

## 2020-07-28 RX ORDER — EPINEPHRINE 1 MG/ML
INJECTION INTRAMUSCULAR; INTRAVENOUS; SUBCUTANEOUS
Status: DISCONTINUED | OUTPATIENT
Start: 2020-07-28 | End: 2020-07-28 | Stop reason: HOSPADM

## 2020-07-28 RX ORDER — SODIUM CHLORIDE 9 MG/ML
INJECTION, SOLUTION INTRAVENOUS CONTINUOUS
Status: DISCONTINUED | OUTPATIENT
Start: 2020-07-28 | End: 2020-07-28 | Stop reason: HOSPADM

## 2020-07-28 RX ORDER — MUPIROCIN 20 MG/G
OINTMENT TOPICAL
Status: DISCONTINUED | OUTPATIENT
Start: 2020-07-28 | End: 2020-07-28 | Stop reason: HOSPADM

## 2020-07-28 RX ORDER — GLYCOPYRROLATE 0.2 MG/ML
INJECTION INTRAMUSCULAR; INTRAVENOUS
Status: DISCONTINUED | OUTPATIENT
Start: 2020-07-28 | End: 2020-07-28

## 2020-07-28 RX ORDER — DIPHENHYDRAMINE HYDROCHLORIDE 50 MG/ML
50 INJECTION INTRAMUSCULAR; INTRAVENOUS
Status: COMPLETED | OUTPATIENT
Start: 2020-07-28 | End: 2020-07-28

## 2020-07-28 RX ORDER — DEXMEDETOMIDINE HYDROCHLORIDE 100 UG/ML
INJECTION, SOLUTION INTRAVENOUS
Status: DISCONTINUED | OUTPATIENT
Start: 2020-07-28 | End: 2020-07-28

## 2020-07-28 RX ORDER — ROPIVACAINE/EPI/CLONIDINE/KET 2.46-0.005
SYRINGE (ML) INJECTION ONCE
Status: DISCONTINUED | OUTPATIENT
Start: 2020-07-28 | End: 2020-07-28 | Stop reason: HOSPADM

## 2020-07-28 RX ORDER — MIDAZOLAM HYDROCHLORIDE 1 MG/ML
0.5 INJECTION INTRAMUSCULAR; INTRAVENOUS
Status: DISCONTINUED | OUTPATIENT
Start: 2020-07-28 | End: 2020-07-28 | Stop reason: HOSPADM

## 2020-07-28 RX ORDER — OXYCODONE HYDROCHLORIDE 5 MG/1
10 TABLET ORAL
Status: DISCONTINUED | OUTPATIENT
Start: 2020-07-28 | End: 2020-07-28 | Stop reason: HOSPADM

## 2020-07-28 RX ORDER — PHENYLEPHRINE HYDROCHLORIDE 10 MG/ML
INJECTION INTRAVENOUS
Status: DISCONTINUED | OUTPATIENT
Start: 2020-07-28 | End: 2020-07-28

## 2020-07-28 RX ORDER — FAMOTIDINE 10 MG/ML
20 INJECTION INTRAVENOUS
Status: COMPLETED | OUTPATIENT
Start: 2020-07-28 | End: 2020-07-28

## 2020-07-28 RX ORDER — SUCCINYLCHOLINE CHLORIDE 20 MG/ML
INJECTION INTRAMUSCULAR; INTRAVENOUS
Status: DISCONTINUED | OUTPATIENT
Start: 2020-07-28 | End: 2020-07-28

## 2020-07-28 RX ORDER — OXYCODONE HYDROCHLORIDE 5 MG/1
10 TABLET ORAL EVERY 4 HOURS PRN
Status: DISCONTINUED | OUTPATIENT
Start: 2020-07-28 | End: 2020-07-28 | Stop reason: HOSPADM

## 2020-07-28 RX ORDER — ROPIVACAINE/EPI/CLONIDINE/KET 2.46-0.005
SYRINGE (ML) INJECTION
Status: DISCONTINUED | OUTPATIENT
Start: 2020-07-28 | End: 2020-07-28 | Stop reason: HOSPADM

## 2020-07-28 RX ORDER — FAMOTIDINE 10 MG/ML
INJECTION INTRAVENOUS
Status: DISCONTINUED | OUTPATIENT
Start: 2020-07-28 | End: 2020-07-28

## 2020-07-28 RX ORDER — FENTANYL CITRATE 50 UG/ML
INJECTION, SOLUTION INTRAMUSCULAR; INTRAVENOUS
Status: DISCONTINUED | OUTPATIENT
Start: 2020-07-28 | End: 2020-07-28

## 2020-07-28 RX ORDER — MIDAZOLAM HYDROCHLORIDE 1 MG/ML
INJECTION, SOLUTION INTRAMUSCULAR; INTRAVENOUS
Status: DISCONTINUED | OUTPATIENT
Start: 2020-07-28 | End: 2020-07-28

## 2020-07-28 RX ORDER — CELECOXIB 200 MG/1
400 CAPSULE ORAL
Status: COMPLETED | OUTPATIENT
Start: 2020-07-28 | End: 2020-07-28

## 2020-07-28 RX ORDER — LIDOCAINE HCL/PF 100 MG/5ML
SYRINGE (ML) INTRAVENOUS
Status: DISCONTINUED | OUTPATIENT
Start: 2020-07-28 | End: 2020-07-28

## 2020-07-28 RX ORDER — ROCURONIUM BROMIDE 10 MG/ML
INJECTION, SOLUTION INTRAVENOUS
Status: DISCONTINUED | OUTPATIENT
Start: 2020-07-28 | End: 2020-07-28

## 2020-07-28 RX ADMIN — DEXMEDETOMIDINE HYDROCHLORIDE 8 MCG: 100 INJECTION, SOLUTION, CONCENTRATE INTRAVENOUS at 11:07

## 2020-07-28 RX ADMIN — OXYCODONE HYDROCHLORIDE 10 MG: 5 TABLET ORAL at 02:07

## 2020-07-28 RX ADMIN — SODIUM CHLORIDE, SODIUM GLUCONATE, SODIUM ACETATE, POTASSIUM CHLORIDE, MAGNESIUM CHLORIDE, SODIUM PHOSPHATE, DIBASIC, AND POTASSIUM PHOSPHATE: .53; .5; .37; .037; .03; .012; .00082 INJECTION, SOLUTION INTRAVENOUS at 11:07

## 2020-07-28 RX ADMIN — METHYLPREDNISOLONE SODIUM SUCCINATE 125 MG: 125 INJECTION, POWDER, FOR SOLUTION INTRAMUSCULAR; INTRAVENOUS at 09:07

## 2020-07-28 RX ADMIN — ACETAMINOPHEN 1000 MG: 500 TABLET ORAL at 09:07

## 2020-07-28 RX ADMIN — PROPOFOL 200 MG: 10 INJECTION, EMULSION INTRAVENOUS at 11:07

## 2020-07-28 RX ADMIN — GLYCOPYRROLATE 0.2 MG: 0.2 INJECTION, SOLUTION INTRAMUSCULAR; INTRAVENOUS at 11:07

## 2020-07-28 RX ADMIN — DIPHENHYDRAMINE HYDROCHLORIDE 50 MG: 50 INJECTION, SOLUTION INTRAMUSCULAR; INTRAVENOUS at 09:07

## 2020-07-28 RX ADMIN — CELECOXIB 400 MG: 200 CAPSULE ORAL at 09:07

## 2020-07-28 RX ADMIN — MIDAZOLAM 2 MG: 1 INJECTION INTRAMUSCULAR; INTRAVENOUS at 10:07

## 2020-07-28 RX ADMIN — ROCURONIUM BROMIDE 10 MG: 10 INJECTION, SOLUTION INTRAVENOUS at 11:07

## 2020-07-28 RX ADMIN — MUPIROCIN: 20 OINTMENT TOPICAL at 09:07

## 2020-07-28 RX ADMIN — FENTANYL CITRATE 50 MCG: 50 INJECTION INTRAMUSCULAR; INTRAVENOUS at 10:07

## 2020-07-28 RX ADMIN — SUCCINYLCHOLINE CHLORIDE 140 MG: 20 INJECTION, SOLUTION INTRAMUSCULAR; INTRAVENOUS at 11:07

## 2020-07-28 RX ADMIN — SODIUM CHLORIDE 1000 ML: 0.9 INJECTION, SOLUTION INTRAVENOUS at 09:07

## 2020-07-28 RX ADMIN — DEXAMETHASONE SODIUM PHOSPHATE 8 MG: 4 INJECTION, SOLUTION INTRAMUSCULAR; INTRAVENOUS at 11:07

## 2020-07-28 RX ADMIN — DEXMEDETOMIDINE HYDROCHLORIDE 12 MCG: 100 INJECTION, SOLUTION, CONCENTRATE INTRAVENOUS at 11:07

## 2020-07-28 RX ADMIN — Medication 20 MG: at 11:07

## 2020-07-28 RX ADMIN — FENTANYL CITRATE 25 MCG: 50 INJECTION INTRAMUSCULAR; INTRAVENOUS at 02:07

## 2020-07-28 RX ADMIN — MIDAZOLAM HYDROCHLORIDE 2 MG: 1 INJECTION, SOLUTION INTRAMUSCULAR; INTRAVENOUS at 11:07

## 2020-07-28 RX ADMIN — MORPHINE SULFATE 4 MG: 4 INJECTION INTRAVENOUS at 11:07

## 2020-07-28 RX ADMIN — FENTANYL CITRATE 50 MCG: 50 INJECTION, SOLUTION INTRAMUSCULAR; INTRAVENOUS at 11:07

## 2020-07-28 RX ADMIN — FENTANYL CITRATE 25 MCG: 50 INJECTION INTRAMUSCULAR; INTRAVENOUS at 03:07

## 2020-07-28 RX ADMIN — LIDOCAINE HYDROCHLORIDE 100 MG: 20 INJECTION, SOLUTION INTRAVENOUS at 11:07

## 2020-07-28 RX ADMIN — PHENYLEPHRINE HYDROCHLORIDE 100 MCG: 10 INJECTION INTRAVENOUS at 01:07

## 2020-07-28 RX ADMIN — PROPOFOL 100 MG: 10 INJECTION, EMULSION INTRAVENOUS at 11:07

## 2020-07-28 RX ADMIN — FAMOTIDINE 20 MG: 10 INJECTION INTRAVENOUS at 09:07

## 2020-07-28 RX ADMIN — Medication 10 ML/HR: at 02:07

## 2020-07-28 RX ADMIN — ONDANSETRON 4 MG: 2 INJECTION INTRAMUSCULAR; INTRAVENOUS at 01:07

## 2020-07-28 RX ADMIN — CEFAZOLIN 2 G: 330 INJECTION, POWDER, FOR SOLUTION INTRAMUSCULAR; INTRAVENOUS at 11:07

## 2020-07-28 RX ADMIN — FAMOTIDINE 20 MG: 10 INJECTION, SOLUTION INTRAVENOUS at 11:07

## 2020-07-28 NOTE — ANESTHESIA PROCEDURE NOTES
Left adductor catheter    Patient location during procedure: pre-op   Block not for primary anesthetic.  Reason for block: at surgeon's request and post-op pain management   Post-op Pain Location: Left knee  Start time: 7/28/2020 11:37 AM  Timeout: 7/28/2020 11:37 AM   End time: 7/28/2020 10:52 AM    Staffing  Authorizing Provider: Georges Wayne MD  Performing Provider: Georges Wayne MD    Preanesthetic Checklist  Completed: patient identified, site marked, surgical consent, pre-op evaluation, timeout performed, IV checked, risks and benefits discussed and monitors and equipment checked  Peripheral Block  Patient position: supine  Prep: ChloraPrep  Patient monitoring: heart rate, cardiac monitor, continuous pulse ox, continuous capnometry and frequent blood pressure checks  Block type: adductor canal  Laterality: left  Injection technique: continuous  Needle  Needle type: Tuohy   Needle gauge: 17 G  Needle length: 3.5 in  Needle localization: ultrasound guidance  Catheter type: spring wound  Catheter size: 19 G  Test dose: lidocaine 1.5% with Epi 1-to-200,000 and negative   -ultrasound image captured on disc.  Assessment  Injection assessment: negative aspiration, negative parasthesia and local visualized surrounding nerve  Paresthesia pain: none  Heart rate change: no  Slow fractionated injection: yes  Additional Notes  Dosed with 0.25% naropin 10 cc and 1.5% Lidocaine with epi 3 cc.

## 2020-07-28 NOTE — BRIEF OP NOTE
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    DISPOSITION: Home or Self Care    FINAL DIAGNOSIS:  Chondromalacia of left knee    FOLLOWUP: In clinic    DISCHARGE INSTRUCTIONS:    Discharge Procedure Orders   CRUTCHES FOR HOME USE     Order Specific Question Answer Comments   Type: Axillary    Height: 6' (1.829 m)    Weight: 103 kg (227 lb)    Length of need (1-99 months): 3      Diet general     Call MD for:  temperature >100.4     Call MD for:  persistent nausea and vomiting     Call MD for:  severe uncontrolled pain     Call MD for:  difficulty breathing, headache or visual disturbances     Call MD for:  redness, tenderness, or signs of infection (pain, swelling, redness, odor or green/yellow discharge around incision site)     Call MD for:  hives     Call MD for:  persistent dizziness or light-headedness

## 2020-07-28 NOTE — ANESTHESIA PREPROCEDURE EVALUATION
07/28/2020  Reji Pond Jr. is a 34 y.o., male.    Active Ambulatory Problems     Diagnosis Date Noted    Elevated liver function tests 10/25/2018    Fatty liver 10/25/2018    Hemangiomas of liver 10/25/2018    Chronic pain of left knee 07/15/2020    Acute internal derangement of left knee 07/15/2020    Chondromalacia of left knee 07/15/2020    Knee stiffness, left 07/15/2020     Resolved Ambulatory Problems     Diagnosis Date Noted    No Resolved Ambulatory Problems     Past Medical History:   Diagnosis Date    Hypertension     Kidney stones          Anesthesia Evaluation    I have reviewed the Patient Summary Reports.    I have reviewed the Nursing Notes.       Review of Systems  Anesthesia Hx:  Denies Hx of Anesthetic complications    Social:  Former Smoker    Cardiovascular:   Exercise tolerance: good Denies Hypertension.  Denies CAD.     Denies Angina.        Pulmonary:   Denies COPD.  Denies Asthma.  Denies Shortness of breath.  Denies Sleep Apnea.    Renal/:   Denies Chronic Renal Disease. renal calculi     Hepatic/GI:   Denies GERD. Liver Disease,    Musculoskeletal:   Arthritis     Neurological:   Denies CVA. Denies Seizures.    Endocrine:   Denies Diabetes. Denies Hypothyroidism.        Physical Exam  General:  Well nourished    Airway/Jaw/Neck:  Airway Findings: Mallampati: III Improves to II with phonation.  TM Distance: Normal, at least 6 cm  Jaw/Neck Findings:  Neck ROM: Normal ROM       Chest/Lungs:  Chest/Lungs Clear    Heart/Vascular:  Heart Findings: Normal       Mental Status:  Mental Status Findings:  Cooperative, Alert and Oriented         Anesthesia Plan  Type of Anesthesia, risks & benefits discussed:  Anesthesia Type:  general  Patient's Preference: GA  Intra-op Monitoring Plan: standard ASA monitors  Intra-op Monitoring Plan Comments:   Post Op Pain Control Plan:  multimodal analgesia, IV/PO Opiods PRN, per primary service following discharge from PACU and peripheral nerve block  Post Op Pain Control Plan Comments:   Induction:   IV  Beta Blocker:  Patient is not currently on a Beta-Blocker (No further documentation required).       Informed Consent: Patient understands risks and agrees with Anesthesia plan.  Questions answered. Anesthesia consent signed with patient.  ASA Score: 2     Day of Surgery Review of History & Physical:    H&P update referred to the surgeon.     Anesthesia Plan Notes: The patient's PMH was reviewed and PE was performed  Plan for GA        Ready For Surgery From Anesthesia Perspective.

## 2020-07-28 NOTE — TRANSFER OF CARE
Anesthesia Transfer of Care Note    Patient: Reji Pond JrMónica    Procedure(s) Performed: Procedure(s) (LRB):  REPAIR, KNEE, ARTHROSCOPIC, WITH OSTEOCHONDRAL GRAFT TRANSFER (Left)  ARTHROSCOPY, KNEE, WITH MENISCECTOMY (Left)  CHONDROPLASTY, KNEE (Left)  SYNOVECTOMY, KNEE (Left)    Patient location: PACU    Anesthesia Type: general    Transport from OR: Transported from OR on 6-10 L/min O2 by face mask with adequate spontaneous ventilation    Post pain: adequate analgesia    Post assessment: no apparent anesthetic complications    Post vital signs: stable    Level of consciousness: sedated    Nausea/Vomiting: no nausea/vomiting    Complications: none    Transfer of care protocol was followed      Last vitals:   Visit Vitals  /63   Pulse 83   Temp (P) 36.9 °C (98.5 °F)   Resp 13   Ht 6' (1.829 m)   Wt 103 kg (227 lb)   SpO2 100%   BMI 30.79 kg/m²

## 2020-07-28 NOTE — PLAN OF CARE
Pre-op complete. Waiting for anesthesia consent, H&P update, and site marking. Girlfriend at bedside. Belongings will be placed in locker when patient rolls to OR. Patient states he will need crutches for discharge.

## 2020-07-28 NOTE — PLAN OF CARE
Discharged to home with family. Pain controlled. Denies nausea. AVS reviewed and copy given. Consents in chart. Ordered DME provided to patient. Verbal and written instructions were given to the patient and a competent caregiver on proper usage. Also educated on the risk of falling if DME is not used/not used properly. All parties verbalized understanding.

## 2020-07-28 NOTE — PROGRESS NOTES
7/28/2020 1542    [facility] teaching done with patients and patient's girlfriend at bedside. Verbalizes understanding. Abe agrees to stay with patient for the next 72 hours while medication is infusing. All questions answered. [facility] contract signed, home care instruction pamphlet and extra home care supplies provided to patient upon discharge. Encouraged to contact anesthesia with any questions/concerns.

## 2020-07-28 NOTE — DISCHARGE INSTRUCTIONS
POLAR CARE CUBE COLD THERAPY SYSTEM    The Polar Care Cube Cold Therapy System is simple and reliable. It is easy to use, compact design makes it great for home use. With the addition of ice and water, you will enjoy 6-8 hours of effortless cold therapy. Proper use requires an insulation barrier between the pad and the patient's skin.  Instructions on how to use the Polar Care Cube Cold Therapy System Below:    Recovery After Procedural Sedation (Adult)  You have been given medicine by vein to make you sleep during your surgery. This may have included both a pain medicine and sleeping medicine. Most of the effects have worn off. But you may still have some drowsiness for the next 6 to 8 hours.    Home care  Follow these guidelines when you get home:  · For the next 8 hours, you should be watched by a responsible adult. This person should make sure your condition is not getting worse.  · Don't drink any alcohol for the next 24 hours.  · Don't drive, operate dangerous machinery, or make important business or personal decisions during the next 24 hours.  Note: Your healthcare provider may tell you not to take any medicine by mouth for pain or sleep in the next 4 hours. These medicines may react with the medicines you were given in the hospital. This could cause a much stronger response than usual.    Follow-up care  Follow up with your healthcare provider if you are not alert and back to your usual level of activity within 12 hours.    When to seek medical advice  Call your healthcare provider right away if any of these occur:  · Drowsiness gets worse  · Weakness or dizziness gets worse  · Repeated vomiting  · You can't be awakened     Date Last Reviewed: 10/18/2016  © 2132-8131 [employer]. [address] [place] [address], [place] [address]. All rights reserved. This information is not intended as a substitute for professional medical care. Always follow your healthcare professional's instructions.    Discharge  Instructions: After Your Surgery  You've just had surgery. During surgery, you were given medicine called anesthesia to keep you relaxed and free of pain. After surgery, you may have some pain or nausea. This is common. Here are some tips for feeling better and getting well after surgery.    Stay on schedule with your medicine.   Going home  Your healthcare provider will show you how to take care of yourself when you go home. He or she will also answer your questions. Have an adult family member or friend drive you home. For the first 24 hours after your surgery:  · Have someone stay with you, if needed. He or she can watch for problems and help keep you safe.  Be sure to go to all follow-up visits with your healthcare provider. And rest after your surgery for as long as your healthcare provider tells you to.    Coping with pain  If you have pain after surgery, pain medicine will help you feel better. Take it as told, before pain becomes severe. Also, ask your healthcare provider or pharmacist about other ways to control pain. This might be with heat, ice, or relaxation. And follow any other instructions your surgeon or nurse gives you.    Tips for taking pain medicine  To get the best relief possible, remember these points:  · Pain medicines can upset your stomach. Taking them with a little food may help.  · Most pain relievers taken by mouth need at least 20 to 30 minutes to start to work.  · Taking medicine on a schedule can help you remember to take it. Try to time your medicine so that you can take it before starting an activity. This might be before you get dressed, go for a walk, or sit down for dinner.  · Constipation is a common side effect of pain medicines. Call your healthcare provider before taking any medicines such as laxatives or stool softeners to help ease constipation. Also ask if you should skip any foods. Drinking lots of fluids and eating foods such as fruits and vegetables that are high in  fiber can also help. Remember, do not take laxatives unless your surgeon has prescribed them.    Your healthcare provider may tell you to take acetaminophen to help ease your pain. Ask him or her how much you are supposed to take each day. Acetaminophen or other pain relievers may interact with your prescription medicines or other over-the-counter (OTC) medicines. Some prescription medicines have acetaminophen and other ingredients. Using both prescription and OTC acetaminophen for pain can cause you to overdose. Read the labels on your OTC medicines with care. This will help you to clearly know the list of ingredients, how much to take, and any warnings. It may also help you not take too much acetaminophen. If you have questions or do not understand the information, ask your pharmacist or healthcare provider to explain it to you before you take the OTC medicine.    Managing nausea  Some people have an upset stomach after surgery. This is often because of anesthesia, pain, or pain medicine, or the stress of surgery. These tips will help you handle nausea and eat healthy foods as you get better. If you were on a special food plan before surgery, ask your healthcare provider if you should follow it while you get better. These tips may help:  · Do not push yourself to eat. Your body will tell you when to eat and how much.  · Start off with clear liquids and soup. They are easier to digest.  · Next try semi-solid foods, such as mashed potatoes, applesauce, and gelatin, as you feel ready.  · Slowly move to solid foods. Don't eat fatty, rich, or spicy foods at first.  · Do not force yourself to have 3 large meals a day. Instead eat smaller amounts more often.  · Take pain medicines with a small amount of solid food, such as crackers or toast, to avoid nausea.     Call your surgeon if  · You still have pain an hour after taking medicine. The medicine may not be strong enough.  · You feel too sleepy, dizzy, or groggy. The  medicine may be too strong.  · You have side effects like nausea, vomiting, or skin changes, such as rash, itching, or hives.       If you have obstructive sleep apnea  You were given anesthesia medicine during surgery to keep you comfortable and free of pain. After surgery, you may have more apnea spells because of this medicine and other medicines you were given. The spells may last longer than usual.   At home:  · Keep using the continuous positive airway pressure (CPAP) device when you sleep. Unless your healthcare provider tells you not to, use it when you sleep, day or night. CPAP is a common device used to treat obstructive sleep apnea.  · Talk with your provider before taking any pain medicine, muscle relaxants, or sedatives. Your provider will tell you about the possible dangers of taking these medicines.  Date Last Reviewed: 12/1/2016 © 2000-2017 [employer]. [address] [place] [address]. All rights reserved. This information is not intended as a substitute for professional medical care. Always follow your healthcare professional's instructions.          PATIENT INSTRUCTIONS  POST-ANESTHESIA    IMMEDIATELY FOLLOWING SURGERY:  Do not drive or operate machinery for the first twenty four hours after surgery.  Do not make any important decisions for twenty four hours after surgery or while taking narcotic pain medications or sedatives.  If you develop intractable nausea and vomiting or a severe headache please notify your doctor immediately.    FOLLOW-UP:  Please make an appointment with your surgeon as instructed. You do not need to follow up with anesthesia unless specifically instructed to do so.    WOUND CARE INSTRUCTIONS (if applicable):  Keep a dry clean dressing on the anesthesia/puncture wound site if there is drainage.  Once the wound has quit draining you may leave it open to air.  Generally you should leave the bandage intact for twenty four hours unless there is  drainage.  If the epidural site drains for more than 36-48 hours please call the anesthesia department.    QUESTIONS?:  Please feel free to call your physician or the hospital  if you have any questions, and they will be happy to assist you.       [facility] Anesthesia Department  [address] [facility]  [place]  [phone]      Wound Check After Surgery: Bleeding  Surgery involves cutting through layers of skin, fatty tissue, muscle, and sometimes bone and cartilage. Sutures (stitches) or staples are used to close all layers of the wound. The sutures on the inside will dissolve in about 2 to 3 weeks. Any sutures or staples used on the outside need to be removed in about 7 to 14 days, depending on the location.  It is normal to have some clear or bloody discharge on the wound covering or bandage (dressing) for the first few days after surgery. If your wound was sutured (sewn) closed, you should not have to change the dressing more than three times a day in the first few days. Bleeding or discharge requiring more frequent dressing changes can be a sign of a problem.    Home care  Different types of surgery require different types of care and dressing changes. It is important to follow all instructions and advice from your surgeon, as well as other members of your healthcare team.    Wound care  · Keep the wound clean, as directed by your healthcare provider.  · Change the dressing as directed. Change the dressing sooner if it becomes wet or stained with blood or fluid from the wound.  · Bathe with a sponge (no shower or tub baths) for the first few days after surgery, or until there is no more drainage from the wound. Unless you received different instructions from your surgeon, you can then shower. Do not soak the area in water (no baths or swimming) until the tape, sutures, or staples are removed and any wound opening has dried out and healed.    Changing the dressing  · Wash your hands before  changing the dressings.  · Carefully remove the dressing and tape; don't just yank it off. If it sticks to the wound, you may need to wet it a little to remove it, unless your healthcare provider told you not to wet it.  · Wash your hands again before putting on a new, clean dressing.  · Gently clean the wound with clean water (or saline) using gauze or a clean washcloth. Do not rub it or pick at it.  · Do not use soap, alcohol, hydrogen peroxide, or any other cleanser.  · If you were told to dry the wound before putting on a new dressing, gently pat it dry. Do not rub.  · Throw out the old dressing. Do not reuse it!  · Wash your hands again when you are done.    Types of dressings  Your healthcare team will tell you what type of dressing to put on your wound. Follow your healthcare team's instructions carefully, and contact them if you have any questions. Two common types of dressings are described below. You may have one of these or another type.  · Dry dressing. Use dry gauze. If the wound is still draining, use a nonadherent dressing, which shouldn't stick to the wound.  · Wet-to-dry dressing. Wet the gauze, and squeeze out the excess water (or saline), before putting it on. Then, cover this with a dry pad.    Medicines  · If you were given antibiotics, take them until they are used up or your healthcare provider tells you to stop. It is important to finish the antibiotics even though you feel better, to make sure the infection has cleared.  · You can take acetaminophen or ibuprofen for pain, unless you were given a different pain medicine to use. (Note: If you have chronic liver or kidney disease, or have ever had a stomach ulcer or gastrointestinal bleeding, or are taking blood thinner medicines, talk with your healthcare provider before using these medicines.)  · Aspirin should never be used in anyone under 18 years of age who is ill with a fever. It may cause severe liver damage.    Follow-up care  Follow  up with your healthcare provider, or as advised, for your next wound check or removal of sutures, staples, or tape.  · If a culture was done, you will be notified if the results will affect your treatment. You can call as directed for the results.  · If imaging tests, such as X-rays, an ultrasound, or CT scan were done, they will be reviewed by a specialist. You will be notified of the results, especially if they affect treatment.    Call 911  Call emergency services right away if any of these occur:  · Trouble breathing or swallowing, wheezing  · Hoarse voice or trouble speaking  · Extreme confusion  · Extreme drowsiness or trouble awakening  · Fainting or loss of consciousness  · Rapid heart rate or very slow heart rate  · Vomiting blood, or large amounts of blood in stool  · Discomfort in the center of the chest that feels like pressure, squeezing, a sense of fullness, or pain  · Discomfort or pain in other upper body areas, such as the back, one or both arms, neck, jaw, or stomach  · Stroke symptoms (spot a stroke FAST)  ¨ F: Face drooping. One side of the face is numb or droops.  ¨ A: Arm weakness. One arm feels weak or numb.  ¨ S: Speech difficulty: Speech is slurred, or the person is unable to speak.  ¨ T: Time to call 911. Even if symptoms go away, call 911.    When to seek medical advice  Call your healthcare provider right away if any of the following occur:  · Fluid or blood soaking 5 or more bandages a day during the first 3 days after surgery  · Fluid or blood still draining from the wound more than 3 days after surgery  · Increasing pain at the site of surgery  · Fever over 100.4º F (38.0º C)  · Redness around the wound  · Pus coming from the wound  · Vomiting, constipation, or diarrhea    Date Last Reviewed: 9/27/2015  © 2743-6225 [employer]. [address] [place] [address], [place] [address]. All rights reserved. This information is not intended as a substitute for professional medical care.  Always follow your healthcare professional's instructions.      Wound Check After Surgery, No Complication    It is normal to feel pain at the incision site. The pain decreases as the wound heals. Most of the pain and soreness from the skin incision should go away by the time the sutures or staples are removed. Soreness and pain from deeper tissues may last another week or two.  Pain that continues more than a few weeks after surgery or pain that worsens anytime after surgery can be a sign of a problem, such as:  · Infection  · Separation of wound edges  · Collection of blood or other below the skin        Date Last Reviewed: 9/27/2015 © 2000-2017 [employer]. [address] [place] [address], [place] [address]. All rights reserved. This information is not intended as a substitute for professional medical care. Always follow your healthcare professional's instructions.     [facility] [address] [facility], [place]                                                                                          [phone]                   Postoperative Instructions for Knee Surgery                 Your Surgery Included:   Open               Arthroscopic   [] Ligament Repair       [] Diagnostic           [] ACL     [] PCL     [] MCL     [] PLLC      [] Synovectomy / Plica Removal [] Meniscal Cartilage Repair / Transplantation      [] Lysis of Adhesions / Manipulation [] Articular Cartilage Repair      [] Interval Release           [] Microfracture       [] OATS   [] ACI      [x] Meniscectomy           [x] Osteochondral Allograft      [] Meniscal Cartilage Repair  [] Patellar Realignment       [] Debridement / Chondroplasty         [] Lateral Release   [] Ligament Repair      [] Articular Cartilage Repair          [] Extensor Mechanism             []   Microfracture  []  OATS         []  Cartilage Biopsy [] Tendon Repair          [] Ligament Reconstruction          [] Patella                  [] Quadriceps              []   ACL    []   PCL  [] High Tibial Osteotomy       [] PRP Arthrocentesis  [] Joint Replacement         [] Amniox Arthrocentesis           [] Unicompartmental   [] Patellofemoral                  Call our office ([phone]) immediately or message through [facility] if you experience any of the following:       Excessive bleeding or pus like drainage at the incision site       Uncontrollable pain not relieved by pain medication       Excessive swelling or redness at the incision site       Fever above 101.5 degrees not controlled with Tylenol or Motrin       Shortness of Breath or severe calf pain       Any foul odor or blistering from the surgery site    FOR EMERGENCIES: [facility] is the best way to contact us. If on the weekend, page the  at [phone] who will direct your call appropriately.    1.   Pain Management: A cold therapy cuff, pain medications, local injections, TENs unit, and in some cases, regional anesthesia injections are used to manage your post-operative pain. The decision to use each of these options is based on their risks and benefits.     Medications: You were given one or more of the following medication prescriptions during your preoperative appointment. Follow the instructions on the bottles.     Narcotic Medication (usually Percocet, Roxicodone, or Norco): Begin taking the medication before your knee starts to hurt. Some patients do not like to take any medication, but if you wait until your pain is severe before taking, you will be very uncomfortable for several hours waiting for the narcotic to work. Always take with food.     Nausea / Vomiting: For this issue, we prescribe Phenergan or Zofran, use this medication as directed as needed for nausea.     Cold Therapy: You may have been sent home with a [facility] cold therapy unit and wrap for your knee. Fill with ice and water to the indicated fill line. You can use 20-30 minutes on then off, several times a  day. This will help relieve pain and control swelling. Do not sleep with on.     Regional Anesthesia Injections (Blocks): You may have been given a regional nerve block either before or after surgery. This may make your entire leg numb for 24-36 hours.                            * Proceed with caution when bearing weight on your leg.     2.   Diet: Eat a bland diet for the first day after surgery. Progress your diet as tolerated. Constipation may occur with Narcotic usage. We recommend Colace 100mg twice a day while taking narcotics.    3.   Activity: Limit your activity during the first 48 hours, keep your leg elevated with pillows under your heel. After the first 48 hours at home, increase your activity level based on your symptoms.    4.   Dressing: (c) The soft, bulky dressing will be removed the morning after surgery. The Aquacel dressing (tan, long adhesive bandage) will remain on until the 1st post operative appointment. Do not remove. It is normal for some blood to be seen on the dressings. It is also normal for you to see apparent bruising on the skin around your incisions. If you are concerned by the drainage or the appearance of your wound site, you can send a picture via [facility].    5.   Shower: (c) You may shower on the 3rd day after surgery. The Aquacel bandage is to be covered with saran wrap before showering. Do not submerge limb in any water for 4 weeks or until incisions completely healed.  Do not get bandage wet.    6.   Knee Brace: You may have been sent home in a hinged knee brace. Your brace is set at 0 to 60 degrees of motion. Wear the brace for 5 days, LOCKED in full extension when walking (6 weeks) and locked straight for 5 days, you will need to wear this brace at all time unless instructed otherwise. You may unlock at rest or for exercises.    7.   Your procedure did not require a Continuous Passive Motion (CPM) device.    8.   Weight Bearing: You may have been sent home with crutches.  "If instructed (see below), use these crutches at all times unless at complete rest.      [x] Non-weight bearing for     6 weeks (you may touch your toes to the floor)      [] Partial weight bearing for  {NUMBER:81173} weeks    [] 25% Body Weight   [] 50% Body Weight      [] Full weight bearing            []  NOW    []  after {NUMBER:06274} weeks     9.  Knee Exercises: Begin these exercises the first day after surgery in order to help you regain your motion and strength. You may do the following marked exercises:     [x] Quad Sets - Begin activating your quadriceps muscle by driving your          knee downward into full knee extension while seated on a table or bed   with a towel rolled and propped under your heel     [x] Straight Leg Raise (SLR) - While nancy your quadriceps muscle, lift     your fully extended leg to the level of your non-operative knee (as shown)     [] Heel Slides - With the knee straight, slide your heel slowly toward your   buttocks, hold at the endpoint for 10-15 seconds, then slowly straighten     [x] Ankle pumps - With your knee straight, move your ankle in a "pumping"    fashion to activate your calf and leg muscles      10.  Physical Therapy: Physical therapy is an essential component to your recovery from surgery. Your physical therapy will start in 3 days.    FIRST POSTOPERATIVE VISIT: As scheduled.      "

## 2020-07-28 NOTE — INTERVAL H&P NOTE
The patient has been examined and the H&P has been reviewed:    I concur with the findings and no changes have occurred since H&P was written.    Anesthesia/Surgery risks, benefits and alternative options discussed and understood by patient/family.          Active Hospital Problems    Diagnosis  POA    Chondromalacia of left knee [M94.262]  Yes      Resolved Hospital Problems   No resolved problems to display.

## 2020-07-28 NOTE — OP NOTE
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]    Post-op Diagnosis: Post-Op Diagnosis Codes:     * Old tear of lateral meniscus of left knee, unspecified tear type [M23.201]     * Chondromalacia of left knee [M94.262]     * Chondromalacia of left patella [M22.42]    Anesthesia: Regional    Procedure in Detail/Findings:  DATE OF PROCEDURE: 7/28/2020    ATTENDING SURGEON: Surgeon(s) and Role:     * Santo Shepherd MD - Primary     * Shelley Machuca MD - Fellow    ASSISTANTS:  Shelley Machuca MD - Fellow  SMA Ino - Assistant     PREOPERATIVE DIAGNOSIS:  Left  Chondromalacia, (excludes patella) M94.29 and Tear, Lateral meniscus, acute S83.289A    POSTOPERATIVE DIAGNOSIS:   Left  Chondromalacia, (excludes patella) M94.29, Internal derangement knee M23.90 and Tear, Lateral meniscus, acute S83.289A    PROCEDURES(S) PERFORMED:   1. Left  Arthroscopy, Osteochondral Allograft 27398  2.  Left  Arthroscopy, with meniscectomy (medial OR lateral) 04940, Lateral  3.  Left  Arthroscopy, debridement/shaving of articular cartilage (chondroplasty) 19941      ANESTHESIA: General, Local, Regional w/ catheter  and Adductor with catheter, Local 30 cc TRACI    FLUIDS IN THE CASE: 2000 ml    ESTIMATED BLOOD LOSS: Minimal    URINE OUTPUT: 0 ml    COMPLICATIONS: none    CONDITION ON RETURN TO RECOVERY ROOM: Good     Expand All Collapse All       IMPLANTS UTILIZED: [employer] arthroscopic equipment    GRAFT SOURCE:  MTF Cartilage Allograft Matrix    MTF Viable Cartilage Fibers    INDICATIONS FOR OPERATIVE PROCEDURE: Reji Pond  is a 34 y.o.  male with history of left knee pain and pathology. The patient's history and physical examination  findings consistent with the procedure performed. He noted significant problems in the area of concern with problems on activities of daily living and aggressive use of the left leg. As a result of these problems and problems with overall activity level, the patient was deemed to be an appropriate candidate for operative intervention. Nonoperative versus operative options were discussed. The risks and benefits were discussed with the patient. The patient acknowledged understanding and wished to proceed with operative intervention. Informed consent was obtained prior to the procedure. Details of the surgical procedure were explained, including incisions and probable rehabilitation course. The patient understands the likely length of convalescence after surgery; and we have explained the risks, benefits, and alternatives of surgery. Reasonable expectations and potential complications were discussed and acknowledged, including but not limited to infection, bleeding, blood clots, (DVT and/or PE), nerve injury, retear, instability, continued pain and stiffness. It was also explained that there was a chance of failure which may require further management. The patient agreed and understood and wished to proceed.       FINDINGS:     ARTICULAR CARTILAGE LESION(S):  Medial Femoral Condyle: ICRS Grade 0      Size: none  Medial tibial plateau: ICRS Grade 0      Size: none    Lateral Femoral Condyle: ICRS Grade 4A      Size: 2.0 x 2.0 cm  Lateral tibial plateau: ICRS Grade 0      Size: none    Patellar surface: ICRS Grade 2     Size: none  Trochlear groove: ICRS Grade 2     Size: none  EXAMINATION UNDER ANESTHESIA:   Extension -10 degrees  Flexion 140 degrees  Lachman Maneuver:  Negative  Anterior Drawer: Negative  Pivot Shift: Negative  Posterior Drawer:  Negative  Varus stability @ 30 degrees: 0  Valgus stability @ 30 degrees: 0  Patellar glide:1 quadrant lateral, 2 quadrant medial          DESCRIPTION OF PROCEDURE: The  patient was brought into the Operating Room and placed in supine position. Upon application of Regional w/ catheter  adductor block in the preoperative holding area, the patient underwent General to stabilize the airway. The patient was given the appropriate dose of antibiotics based on body weight. Timeout was utilized to verify the side as the operative side. Both upper extremities were placed in comfortable position. Examination under anesthesia was performed. The nonoperative leg was carefully padded along the heel and peroneal nerve regions and maintained flat on the table. The operative leg was then stabilized with a lateral post for intra-operative positioning as well as a popliteal post placed at the mid-calf level.  No bump was placed under the hip on the operative side. The operative leg was prepped and draped in a sterile fashion with ChloraPrep material.    We injected 0.5% ropivacaine mixture into the anterolateral and anteromedial aspect of the knee with application of 15 mL per portal site. A #11 blade was used to make the arthroscopic portals. Blunt trocar and sheath were inserted into the intercondylar notch and then subsequently into the suprapatellar pouch. This patellofemoral joint was visualized, demonstrating normal lateral patellar tilt, normal patellar subluxation. The patellar tracked midline with flexion and extension. Arthroscopic pictures were obtained. There was the above stated cartilage damage as a result an arthroscopic shaver was used to debride the lesion sites for an effective arthroscopic chondroplasty.    Attention was turned to the intercondylar notch where the anterior cruciate ligament (ACL) and posterior cruciate ligament (PCL) structures were visualized. Visualization demonstrated an intact ACL and an intact PCL. Probe analysis revealed no signs of occult pathology within the ligamentous structures.     Attention was then turned to the lateral compartment. The patient  demonstrated a radial type tear of the central body region of the lateral meniscus. Arthroscopic instrumentation was used to carefully remove the tear and removing 25 % of the central body region of the lateral meniscus. The remaining meniscus structure laterally was found to be intact.    The lesion if present was treated withConversion to open procedure for Cartimax placement.    Attention was then turned to the medial compartment. The patient demonstrated an intact medial meniscus with probe analysis demonstrating no occult tears or pathology Arthroscopic instrumentation was used to  veify no tear and pictures obtained. There was no articular cartilage damage in the medial compartment The lesion if present was treated with observation.    Conversion open procedure.     Open osteochondral allograft transplantation:  Conversion to an open procedure was performed  By incorporating the lateral arthroscopic portal and performing a modified lateral subvastus approach. The Involved region was visualized with the area of damage visualized. The 2.0 x 2.0 cm ICRS 4A lesion was debrided and the loose damaged cartilage, fibrocartilage and calcified cartilage layer as well as damaged bone removed creating a healing bed in the region. The involved area was deemed to be repairable by the Cartimax technique. The vertical normal cartilage edges were obtained and attention turned to creation of the Cartimax material. The Cartimax cartilage fibers were thawed from cryopreservation and sequential rinsing of the media from the material was performed with normal saline at room temperature. The fibers were then mixed with lyophilized cartilage powder from [facility] and a putty consistency created. The Cartimax mixture was placed into the defect and smoothed to the surface of the surrounding normal articular cartilage. Pictures were obtained documenting the fill and solid repair. The patella was carefully reverted back into position. The  synovial / capsular lining was sewn with #1 vicryl suture and the medial retinaculum repair with vicryl suture as well. The patella was stable. Subcutaneous tissues were closed with 3-0 vicryl sutures and monocryl suture was placed in subcuticular fashion. Skin was closed with Prenio tape and dermabond ointment     Final arthroscopic pictures were obtained. Fluid was extravasated from the joint. A 4-0 nylon sutures were used to close the arthroscopic portals. We then injected additional 0.5% ropivicaine mixture using 10 ml per portal site and along then anterior portion of the knee.  Xeroform was applied along with application of sterile electrodes proximally and distally, gauze, ABD pads,cast padding, long-leg TRIPP hose stocking and cooling unit. The patient's knee was placed into a hinged immobilizer, which was locked in -10 degrees extension and allowed the flexion to 60 degrees. The patient was then allowed to recover from anesthesia.  General was removed. The patient was taken to Recovery Room in  Good condition. At the completion case, all instrument and sponge counts were   correct.    NOTE: I was present and scrubbed for the key portions of the procedure.    PHYSICAL THERAPY:  The patient should begin physical therapy on postoperative   day # 7 and will be advanced to outpatient therapy as soon as   Possible following discharge.  Weight bearing:non weight bearing  left leg  Range of Motion:no motion for 1 weeks (5 Days)    Continuous passive motion: (CPM) machine will be used postoperatively:    If CPM is required if no cpm machine is used please disregard the instructions:   Start CPM on postop day # 5-6 at  10 degrees hyperextension to 60 degrees flexion as tolerated for 6-8 hours per day for 4 weeks. Advancing to 120 degrees 5 degrees per day as tolerated.      If the CPM is required the patient is to be taken out of the CPM machine as much as possible.    Additional exercises to be performed are:   to  begin quad sets with a heel roll to obtain hyperextension, straight leg raise and heel slides with the heel supported in a closed-chain fashion    Immediate specific exercises should include:   Gait program should include the above stated weightbearing; in addition: protected gait following symptoms of pain and swelling    Immobilizer if present should be locked @ -10 degrees with gait and allowed to flex to 60 degrees at rest.     Discharge summary:  The patient was discharged to home in Good  Follow-up as scheduled preoperatively.    Medication(s): Refer to Discharge Medication List         Resume preoperative diet as tolerated    Activity per outpatient discharge instruction sheet              Estimated Blood Loss: * No values recorded between 7/28/2020 11:44 AM and 7/28/2020  1:50 PM *           Specimens (From admission, onward)    None        Implants:   Implant Name Type Inv. Item Serial No.  Lot No. LRB No. Used Action   [id number]   [id number]  Left 1 Implanted   [id number]   [id number]  Left 1 Implanted              Disposition: PACU - hemodynamically stable.           Condition: Good    Attestation:  I was present and scrubbed for the entire procedure.           Discharge Note    Admit Date: 7/28/2020    Attending Physician: Santo Shepherd MD     Discharge Physician: aSnto Shepherd MD    Final Diagnosis: Post-Op Diagnosis Codes:     * Old tear of lateral meniscus of left knee, unspecified tear type [M23.201]     * Chondromalacia of left knee [M94.262]     * Chondromalacia of left patella [M22.42]    Disposition: Home or Self Care    Patient Instructions:   Current Discharge Medication List      CONTINUE these medications which have NOT CHANGED    Details   !! celecoxib (CELEBREX) 200 MG capsule Take 1 capsule (200 mg total) by mouth 2 (two) times daily.  Qty: 60 capsule, Refills: 6      albuterol (PROVENTIL/VENTOLIN HFA) 90 mcg/actuation inhaler Inhale 1-2 puffs into the  lungs every 6 (six) hours as needed. Rescue  Qty: 1 Inhaler, Refills: 0      aspirin (ECOTRIN) 325 MG EC tablet Take 1 tablet (325 mg total) by mouth once daily.  Qty: 42 tablet, Refills: 0    Comments: POST OP MEDS TO BE DELIVERED BEDSIDE AT [place]  Associated Diagnoses: Chondromalacia of left knee; Chondromalacia, patella, left; Other tear of lateral meniscus of left knee as current injury, subsequent encounter      !! celecoxib (CELEBREX) 200 MG capsule Take 1 capsule (200 mg total) by mouth 2 (two) times daily.  Qty: 60 capsule, Refills: 2    Comments: POST OP MEDS TO BE DELIVERED BEDSIDE AT [place]  Associated Diagnoses: Chondromalacia of left knee; Chondromalacia, patella, left; Other tear of lateral meniscus of left knee as current injury, subsequent encounter      diclofenac (VOLTAREN) 75 MG EC tablet Take 1 tablet (75 mg total) by mouth 2 (two) times daily.  Qty: 60 tablet, Refills: 0      meclizine (ANTIVERT) 25 mg tablet Take 1 tablet (25 mg total) by mouth 3 (three) times daily as needed.  Qty: 20 tablet, Refills: 0      oxyCODONE-acetaminophen (PERCOCET)  mg per tablet Take 1 tablet by mouth every 6 (six) hours as needed.  Qty: 28 tablet, Refills: 0    Comments: Quantity prescribed more than 7 day supply? No      POST OP MEDS TO BE DELIVERED BEDSIDE AT [place]  Associated Diagnoses: Chondromalacia of left knee; Chondromalacia, patella, left; Other tear of lateral meniscus of left knee as current injury, subsequent encounter      promethazine (PHENERGAN) 25 MG tablet Take 1 tablet (25 mg total) by mouth every 6 (six) hours as needed.  Qty: 30 tablet, Refills: 0    Comments: POST OP MEDS TO BE DELIVERED BEDSIDE AT [place]  Associated Diagnoses: Chondromalacia of left knee; Chondromalacia, patella, left; Other tear of lateral meniscus of left knee as current injury, subsequent encounter       !! - Potential duplicate medications found. Please discuss with provider.          Discharge Procedure  Orders (must include Diet, Follow-up, Activity)   Discharge Procedure Orders (must include Diet, Follow-up, Activity)   CRUTCHES FOR HOME USE     Order Specific Question Answer Comments   Type: Axillary    Height: 6' (1.829 m)    Weight: 103 kg (227 lb)    Length of need (1-99 months): 3      Diet general     Call MD for:  temperature >100.4     Call MD for:  persistent nausea and vomiting     Call MD for:  severe uncontrolled pain     Call MD for:  difficulty breathing, headache or visual disturbances     Call MD for:  redness, tenderness, or signs of infection (pain, swelling, redness, odor or green/yellow discharge around incision site)     Call MD for:  hives     Call MD for:  persistent dizziness or light-headedness        Discharge Date: No discharge date for patient encounter.

## 2020-07-29 ENCOUNTER — TELEPHONE (OUTPATIENT)
Dept: SPORTS MEDICINE | Facility: CLINIC | Age: 35
End: 2020-07-29

## 2020-07-29 ENCOUNTER — CLINICAL SUPPORT (OUTPATIENT)
Dept: REHABILITATION | Facility: HOSPITAL | Age: 35
End: 2020-07-29
Attending: ORTHOPAEDIC SURGERY
Payer: COMMERCIAL

## 2020-07-29 VITALS
TEMPERATURE: 98 F | BODY MASS INDEX: 30.75 KG/M2 | HEART RATE: 87 BPM | RESPIRATION RATE: 18 BRPM | WEIGHT: 227 LBS | DIASTOLIC BLOOD PRESSURE: 95 MMHG | OXYGEN SATURATION: 96 % | SYSTOLIC BLOOD PRESSURE: 157 MMHG | HEIGHT: 72 IN

## 2020-07-29 DIAGNOSIS — R29.898 DECREASED STRENGTH OF LOWER EXTREMITY: ICD-10-CM

## 2020-07-29 DIAGNOSIS — R26.2 DIFFICULTY WALKING: ICD-10-CM

## 2020-07-29 DIAGNOSIS — M25.562 ACUTE PAIN OF LEFT KNEE: Primary | ICD-10-CM

## 2020-07-29 DIAGNOSIS — M23.92 ACUTE INTERNAL DERANGEMENT OF LEFT KNEE: ICD-10-CM

## 2020-07-29 DIAGNOSIS — M25.662 DECREASED RANGE OF MOTION (ROM) OF LEFT KNEE: ICD-10-CM

## 2020-07-29 PROBLEM — S09.93XA LIP INJURY, INITIAL ENCOUNTER: Status: ACTIVE | Noted: 2020-07-29

## 2020-07-29 LAB — SARS-COV-2 RDRP RESP QL NAA+PROBE: NEGATIVE

## 2020-07-29 PROCEDURE — U0002 COVID-19 LAB TEST NON-CDC: HCPCS

## 2020-07-29 PROCEDURE — 97161 PT EVAL LOW COMPLEX 20 MIN: CPT | Mod: PN

## 2020-07-29 RX ORDER — TRAMADOL HYDROCHLORIDE 50 MG/1
50 TABLET ORAL EVERY 6 HOURS PRN
Qty: 28 EACH | Refills: 0 | Status: SHIPPED | OUTPATIENT
Start: 2020-07-29 | End: 2020-08-04 | Stop reason: SDUPTHER

## 2020-07-29 NOTE — TELEPHONE ENCOUNTER
Spoke to the patient's girlfriend Puja     Pt is still having pain after the OnQ was removed in ED.    Requesting to have something called in for breakthrough pain. Sent orders for Tramadol to providers.    ----- Message from Donnie Willis sent at 7/29/2020 11:25 AM CDT -----  Contact: Puja/girlfriend  Please call Puja at [phone]    Patient went to the emergency room and they removed the pain pump    Requesting something stronger than the Percocet    Use [id number] [place] [id number] AT [facility] [address] [phone] (Phone)  [phone] (Fax)    Thank you

## 2020-07-29 NOTE — PLAN OF CARE
Physical Therapy Initial Evaluation     Name: Reji Pond Jr.  Clinic Number: [id number]    Therapy Diagnosis:   Encounter Diagnoses   Name Primary?    Acute internal derangement of left knee     Acute pain of left knee Yes    Decreased range of motion (ROM) of left knee     Decreased strength of lower extremity     Difficulty walking      Physician: Santo Shepherd MD    Physician Orders: PT Eval and Treat   Medical Diagnosis from Referral: Acute internal derangement of left knee  Evaluation Date: 7/29/2020  Authorization Period Expiration: 12/31/20  Plan of Care Expiration: 10/29/20  Visit # / Visits authorized: 1/ 20    Time In: 5:30pm  Time Out: 6:00pm  Total Billable Time: 30 minutes    Precautions:   DOS: 7/28/20 - Dr. Shepherd POD # 1  1. Left Arthroscopy, Osteochondral Allograft 23318  2.  Left Arthroscopy, with meniscectomy (medial OR lateral) 51469, Lateral  3.  Left Arthroscopy, debridement/shaving of articular cartilage (chondroplasty) 11901    PHYSICAL THERAPY:  Weight bearing:non weight bearing  left leg  Range of Motion:no motion for 1 weeks (5 Days)    Start CPM on postop day # 5-6 at  10 degrees hyperextension to 60 degrees flexion as tolerated for 6-8 hours per day for 4 weeks. Advancing to 120 degrees 5 degrees per day as tolerated.   If the CPM is required the patient is to be taken out of the CPM machine as much as possible.     Immobilizer if present should be locked @ -10 degrees with gait and allowed to flex to 60 degrees at rest.           Subjective     Date of onset: POD #1  History of current condition - Reji reports: S/p L Meniscectomy and Femoral Osteochondral Allograft. Had lip swelling after surgery due to difficulty with intubation tube. Pain pump was removed in suspicion of allergic reaction. He was instructed to not bear-weight or move L LE for 5 days. 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 With initial injury the pain was focused popliteal region to distal ankle. Work injury was ~2 years ago. Walking indep prior to surgery, walking indep with slight limp. 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  has a past surgical history that includes Knee arthroscopy w/ OATS procedure (Left, 7/28/2020); Knee arthroscopy w/ meniscectomy (Left, 7/28/2020); and Chondroplasty of knee (Left, 7/28/2020).    Medications:   Reji has a current medication list which includes the following prescription(s): albuterol, aspirin, celecoxib, celecoxib, diclofenac, meclizine, oxycodone-acetaminophen, promethazine, and tramadol.    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  Prior Level of Function: indep  DME owned/used: crutches  Current Level of Function: mod indep with crutches    Pain:  Current 4/10, worst 8/10, best 3/10   Location: left knee   Description: Sharp  Aggravating Factors: sitting, standing, walking  Easing Factors:     Pts goals: pain-free, walk normally, return to work    Objective       Observation: dry, intact TRIPP hose and surgical dressings; dressings not removed this session, t-scope brace donned and set properly  Tiffanie's Sign: negative    Range of Motion: Knee   Left Right   Flexion: N/T per post-op precautions 130   Extension +2 lacking 0     Strength: Knee   Left Right   Quadriceps N/T per post-op precautions 5/5   Hamstrings N/T per post-op precautions 5/5       Strength:  "Hip   Left Right   Iliopsoas 3+/5 5/5   Glute Med 3+/5 5/5   Hip Ext 3+/5 5/5   Ankle PF 4/5 5/5   Ankle DF 4/5 5/5     Joint Mobility: hypomobile L patellofemoral  Palpation: global tenderness over zahraa-patellar region  Sensation: impaired to light touch    Edema:  Left: moderate    Gait: Salty ambulated with auxillary crutches.  Level of Assistance: mod independent  Patient displays NWB on L LE, step-to gait pattern    Functional Limitations Reports - G Codes  Category: mobility  Tool: FOTO Knee Survey  Score: 99% Limitation   Modifier  Impairment Limitation Restriction    CH  0 % impaired, limited or restricted    CI  @ least 1% but less than 20% impaired, limited or restricted    CJ  @ least 20%<40% impaired, limited or restricted    CK  @ least 40%<60% impaired, limited or restricted    CL  @ least 60% <80% impaired, limited or restricted    CM  @ least 80%<100% impaired limited or restricted    CN  100% impaired, limited or restricted     Current/  CM = % Limitation  Goal/ : CK = 40-60% Limitation     Pt/family was provided educational information, including: role of PT, goals for PT, scheduling - pt verbalized understanding. Discussed insurance limitations with pt.     TREATMENT     Treatment Time In: 5:45pm  Treatment Time Out: 6:00pm  Total Treatment time separate from Evaluation: 15 minutes    Reji received therapeutic exercises to develop strength, endurance, ROM, flexibility, posture and core stabilization for 10 minutes including:    Ankle Pumps 30x  Calf Str c/ strap in long-sitting 3x30"  HS Str in long-sitting 3x30"  Heel Prop 3 mins  Quad Set 2x10    Reji received the following manual therapy techniques: Joint mobilizations were applied to the: L knee for 5 minutes, including:  Patella mobs - all directions      Home Exercises and Patient Education Provided    Education provided:   Post-op precautions, rehab milestones    Written Home Exercises Provided: yes.  Exercises were " reviewed and Reji was able to demonstrate them prior to the end of the session.  Reji demonstrated good  understanding of the education provided.     See EMR under Patient Instructions for exercises provided 7/29/2020.    Assessment     Reji is a 34 y.o. male referred to outpatient Physical Therapy with a medical diagnosis of s/p L Osteochondral Allograft + Meniscectomy . with signs and symptoms including: increased L Knee pain, decreased knee ROM, decreased LE Strength, soft tissue dysfunction, postural imbalance,impaired joint mobility, and decreased tolerance to functional activities. Pt presented with brace donned properly and surgical dressing/TRIPP hose dry and intact; dressing removal deferred until next session for decreased risk of infection. Negative Tiffanie's sign with no calf tenderness or swelling noted. Pt with mild swelling at bottom lip, no acute pain or distress noted. Flexion ROM and active strength testing deferred per post-op instructions. Pt demonstrates typical presentation POD #1. Pt with good motivation to perform physical activity and responds well to cueing.      Pt prognosis is Excellent.   Pt will benefit from skilled outpatient Physical Therapy to address the deficits stated above and in the chart below, provide pt/family education, and to maximize pt's level of independence.     Plan of care discussed with patient: Yes  Pt's spiritual, cultural and educational needs considered and patient is agreeable to the plan of care and goals as stated below:     Anticipated Barriers for therapy: COVID-19 Concerns    Medical Necessity is demonstrated by the following  History  Co-morbidities and personal factors that may impact the plan of care Co-morbidities:   HTN    Personal Factors:   Original work injury     moderate   Examination  Body Structures and Functions, activity limitations and participation restrictions that may impact the plan of care Body Regions:   lower extremities  trunk    Body  Systems:    gross symmetry  ROM  strength  gross coordinated movement  balance  gait  transfers  transitions  motor control  motor learning  edema  scar formation  skin integrity    Participation Restrictions:   Walking, standing, transferring, sitting, driving, ADLs    Activity limitations:   Learning and applying knowledge  no deficits    General Tasks and Commands  no deficits    Communication  no deficits    Mobility  lifting and carrying objects  walking  moving around using equipment (WC)  using transportation (bus, train, plane, car)  driving (bike, car, motorcycle)    Self care  washing oneself (bathing, drying, washing hands)  caring for body parts (brushing teeth, shaving, grooming)  toileting  dressing  eating  drinking  looking after one's health    Domestic Life  shopping  cooking  doing house work (cleaning house, washing dishes, laundry)  assisting others    Interactions/Relationships  no deficits    Life Areas  no deficits    Community and Social Life  no deficits         high   Clinical Presentation stable and uncomplicated low   Decision Making/ Complexity Score: low     Pt's spiritual, cultural and educational needs considered and pt agreeable to plan of care and goals as stated below:     Short Term GOALS: 6 weeks. Pt agrees with goals set.  1. Patient demonstrates independence with HEP.   2. Patient demonstrates increased strength BLE's by 1/3 muscle grade or greater to improve tolerance to functional activities pain free.  3. Patient demonstrates increased 0 to 120 degrees to improve tolerance to functional activities pain free.   4. Patient will report pain of 4/10 at worst, on 0-10 pain scale, with all activity  5. Patient demonstrates ability to walk 500 feet indep without AD, appropriate gait pattern, no pain provocation    Long Term GOALS: 12 weeks. Pt agrees with goals set.  1. Patient demonstrates increased L knee ROM symmetrical to R knee to improve tolerance to functional activities  pain free.   2. Patient demonstrates increased strength BLE's to 4+/5 or greater to improve tolerance to functional activities pain free.   3. Patient demonstrates improved overall function per FOTO Knee Survey to 50% Limitation or less.   4. Patient will report pain of 2/10 at worst, on 0-10 pain scale, with all activity  5. Patient demonstrates ability to walk 1 mile indep without AD , proper gait pattern, no pain provocation  6. Patient demonstrates ability to perform typical work duties, without pain, appropriate movement pattern    PLAN     Plan of care Certification: 7/29/2020 to 10/29/20.    Outpatient Physical Therapy 3 times weekly for 12 weeks to include the following interventions: Electrical Stimulation IFC/NMES, Gait Training, Iontophoresis (with Dexamethasone), Manual Therapy, Moist Heat/ Ice, Neuromuscular Re-ed, Patient Education, Self Care, Therapeutic Activites, Therapeutic Exercise, Ultrasound and Dry Needling.  Pt may be seen by PTA as part of the rehabilitation team.     Danish Carter, PT  7/29/2020    I have seen the patient, reviewed the therapist's plan of care, and I agree with the plan of care.      I certify the need for these services furnished under this plan of treatment and while under my care.     ___________________ ________ Physician/Referring Practitioner            ___________________________ Date of Signature

## 2020-07-29 NOTE — ED PROVIDER NOTES
CC: Oral Swelling (L knee surgery at 1200 today. Noticed lip and uvula swelling 1hr pta. Denies sob or difficulty swallowing. Has not taken any meds since d/c. )      History provided by:   Patient   HPI: Reji Pond Jr. is a 34 y.o. year old male who presents to the ED complaining of upper lip swelling.  Patient is status post a left knee surgery with Dr. Shepherd for meniscal repack he reports that around 345 his girlfriend noticed that his left upper lip with mildly swollen, went home and approximately 1 hr ago he noticed at the upper lip suddenly got worse.  He denies chest pain shortness of breath, no skin rash no pruritus    Patient denies taking any medications from the time he went out of the OR until he presented to the emergency department  He also had an adducted catheter placed for local anesthesia    Patient denies any history of allergy reaction before      Past Medical History:   Diagnosis Date    Elevated liver function tests 10/25/2018    Fatty liver 10/25/2018    Hypertension     Kidney stones      No past surgical history on file.  Family History   Problem Relation Age of Onset    Heart failure Father      Current Facility-Administered Medications on File Prior to Encounter   Medication Dose Route Frequency Provider Last Rate Last Dose    [COMPLETED] acetaminophen tablet 1,000 mg  1,000 mg Oral Once Pre-Op Alfred Sotomayor MD   1,000 mg at 07/28/20 0911    [COMPLETED] celecoxib capsule 400 mg  400 mg Oral Once Pre-Op Alfred Sotomayor MD   400 mg at 07/28/20 0911    [DISCONTINUED] 0.9%  NaCl infusion   Intravenous Continuous Shital Fulton PA-C   Stopped at 07/28/20 1152    [DISCONTINUED] ceFAZolin injection 2 g  2 g Intravenous On Call Procedure Shital Fulton PA-C   2 g at 07/28/20 1138    [DISCONTINUED] dexamethasone injection    PRN Jose Dunbar CRNA   8 mg at 07/28/20 1126    [DISCONTINUED] dexMEDEtomidine injection    PRN Jose Dunbar CRNA   8 mcg at 07/28/20 1138     [DISCONTINUED] electrolyte-S (ISOLYTE)    Continuous PRN Jose Dunbar CRNA        [DISCONTINUED] EPINEPHrine injection    PRN Santo Shepherd MD   6 mg at 07/28/20 1309    [DISCONTINUED] famotidine (PF) injection    PRN Jose Dunbar, CRNA   20 mg at 07/28/20 1121    [DISCONTINUED] fentaNYL injection 25 mcg  25 mcg Intravenous Q5 Min PRN Alfred Sotomayor MD   50 mcg at 07/28/20 1034    [DISCONTINUED] fentaNYL injection 25 mcg  25 mcg Intravenous Q5 Min PRN Trav Russo MD   25 mcg at 07/28/20 1502    [DISCONTINUED] fentaNYL injection    PRN Jose Dunbar CRNA   50 mcg at 07/28/20 1136    [DISCONTINUED] glycopyrrolate injection   Intravenous PRN Jose Dunbar CRNA   0.2 mg at 07/28/20 1121    [DISCONTINUED] ketamine in 0.9 % sod chloride 100 mg/10 mL (10 mg/mL) injection    PRN Jose Dunbar CRNA   20 mg at 07/28/20 1126    [DISCONTINUED] lidocaine (cardiac) injection    PRN Jose Dunbar CRNA   100 mg at 07/28/20 1126    [DISCONTINUED] midazolam (VERSED) 1 mg/mL injection 0.5 mg  0.5 mg Intravenous PRN Alfred Sotomayor MD   2 mg at 07/28/20 1031    [DISCONTINUED] midazolam injection   Intravenous PRN Jose Dunbar CRNA   2 mg at 07/28/20 1121    [DISCONTINUED] morphine injection 2 mg  2 mg Intravenous Q10 Min PRN Shelley Machuca MD        [DISCONTINUED] mupirocin 2 % ointment   Nasal On Call Procedure Shital Fulton PA-C        [DISCONTINUED] ondansetron injection 4 mg  4 mg Intravenous Q12H PRN Shelley Machuca MD        [DISCONTINUED] ondansetron injection   Intravenous PRN Jose Dunbar CRNA   4 mg at 07/28/20 1304    [DISCONTINUED] oxyCODONE immediate release tablet 10 mg  10 mg Oral Q3H PRN Trav Russo MD   10 mg at 07/28/20 1456    [DISCONTINUED] oxyCODONE immediate release tablet 10 mg  10 mg Oral Q4H PRN Shelley Machuca MD        [DISCONTINUED] phenylephrine injection    PRN Jose Dunbar, CRNA   100 mcg at 07/28/20 1320     [DISCONTINUED] promethazine tablet 25 mg  25 mg Oral Q6H PRN Shelley Machuca MD        [DISCONTINUED] propofol (DIPRIVAN) 10 mg/mL infusion    PRN Jose Dunbar CRNA   100 mg at 07/28/20 1135    [DISCONTINUED] rocuronium injection   Intravenous PRN Jose Dunbar CRNA   10 mg at 07/28/20 1133    [DISCONTINUED] ropivacaine (Naropin) 1000 mg/500 mL (2 mg/mL) Nimbus PainPRO Pump infusion  10 mL/hr Perineural Continuous Alfred Sotomayor MD 10 mL/hr at 07/28/20 1435 10 mL/hr at 07/28/20 1435    [DISCONTINUED] ropivacaine-epi-clonid-ketorol (TRACI) 2.46-0.005- 0.0008-0.3mg/mL solution Syrg   Intra-articular Once Shital Fulton PA-C        [DISCONTINUED] ropivacaine-epi-clonid-ketorol (TRACI) 2.46-0.005- 0.0008-0.3mg/mL solution Syrg    PRN Santo Shepherd MD   30 mL at 07/28/20 1310    [DISCONTINUED] sodium chloride 0.9% flush 3 mL  3 mL Intravenous Q6H Trav Russo MD        [DISCONTINUED] succinylcholine injection    PRN Jose Dunbar CRNA   140 mg at 07/28/20 1133    [DISCONTINUED] traMADoL tablet 100 mg  100 mg Oral Q6H PRN Shelley Machuca MD         Current Outpatient Medications on File Prior to Encounter   Medication Sig Dispense Refill    albuterol (PROVENTIL/VENTOLIN HFA) 90 mcg/actuation inhaler Inhale 1-2 puffs into the lungs every 6 (six) hours as needed. Rescue 1 Inhaler 0    aspirin (ECOTRIN) 325 MG EC tablet Take 1 tablet (325 mg total) by mouth once daily. 42 tablet 0    celecoxib (CELEBREX) 200 MG capsule Take 1 capsule (200 mg total) by mouth 2 (two) times daily. 60 capsule 6    celecoxib (CELEBREX) 200 MG capsule Take 1 capsule (200 mg total) by mouth 2 (two) times daily. 60 capsule 2    diclofenac (VOLTAREN) 75 MG EC tablet Take 1 tablet (75 mg total) by mouth 2 (two) times daily. 60 tablet 0    meclizine (ANTIVERT) 25 mg tablet Take 1 tablet (25 mg total) by mouth 3 (three) times daily as needed. 20 tablet 0    oxyCODONE-acetaminophen (PERCOCET)  mg  per tablet Take 1 tablet by mouth every 6 (six) hours as needed. 28 tablet 0    promethazine (PHENERGAN) 25 MG tablet Take 1 tablet (25 mg total) by mouth every 6 (six) hours as needed. 30 tablet 0     Patient has no known allergies.  Social History     Socioeconomic History    Marital status: Single     Spouse name: Not on file    Number of children: Not on file    Years of education: Not on file    Highest education level: Not on file   Occupational History    Not on file   Social Needs    Financial resource strain: Not on file    Food insecurity     Worry: Not on file     Inability: Not on file    Transportation needs     Medical: Not on file     Non-medical: Not on file   Tobacco Use    Smoking status: Former Smoker    Smokeless tobacco: Never Used   Substance and Sexual Activity    Alcohol use: Yes     Frequency: 2-3 times a week     Drinks per session: 3 or 4     Binge frequency: Never     Comment: occasionally     Drug use: No    Sexual activity: Not on file   Lifestyle    Physical activity     Days per week: Not on file     Minutes per session: Not on file    Stress: Not on file   Relationships    Social connections     Talks on phone: Not on file     Gets together: Not on file     Attends Congregation service: Not on file     Active member of club or organization: Not on file     Attends meetings of clubs or organizations: Not on file     Relationship status: Not on file   Other Topics Concern    Not on file   Social History Narrative    Not on file       ROS:     Constitutional : neg for fever, neg for weakness  HENT neg for head injury, neg for sore throat, swelling of the left upper lip  Eyes: neg for visual changes, neg for eye pain  Resp neg for SOB, neg for cough  Cardiac  neg for chest pain, neg for palpitations  GI neg for abd pain, neg for nausea, neg for vomiting   neg for urinary changes  Neuro neg for focal weakness or numbness  Skin neg for skin rash  MSK: neg for myalgia, neg  for arthralgia  ALL: Patient has no known allergies.    PHYSICAL EXAM:  Vitals:    07/29/20 0016   BP: (!) 157/95   Pulse: 87   Resp:    Temp:          PHYSICAL EXAM:     general: comfortable, in no acute distress, pleasant, well nourished  VS: triage VS reviewed  HENT: NC/AT.  Swelling of the left upper lip lateral half,  some edema of the uvula.  No tongue swelling no stridor no trismus  Eyes: PERRL, EOMI  CV: RRR, no  murmurs, no rubs, no gallops, no LE edema  Resp: comfortable breathing, speaks in full sentences, CTAB, no wheezing, no crackles, no ronchi  ABD:  soft, ND, + normal BS, NT  Renal: No CVAT  Neuro: AAO x 3, 5/5 strength x 4 extremities, sensation intact, face symmetric, speech normal  MSK: no deformity, no edema  Lower extremity in a knee immobilizer, adductor catheter for left anterior thigh          DATA & INTERVENTIONS:    LABS reviewed:  Labs Reviewed   SARS-COV-2 RNA AMPLIFICATION, QUAL       RADIOLOGY reviewed:  Imaging Results    None         MEDICATIONS/FLUIDS:  Medications   methylPREDNISolone sodium succinate injection 125 mg (125 mg Intravenous Given 7/28/20 2109)   diphenhydrAMINE injection 50 mg (50 mg Intravenous Given 7/28/20 2109)   famotidine (PF) injection 20 mg (20 mg Intravenous Given 7/28/20 2109)   morphine injection 4 mg (4 mg Intravenous Given 7/28/20 2352)         MDM:  Reji Pond  is a 34 y.o. year old male who presents to the ED with left upper lip swelling and uvula swelling after left knee surgery today, his symptoms got worse in the last hour prior to the presentation despite no PO  medications taken in between the time he was discharged from the OR and presentation to the ED.  He has a left adductor catheter for local anesthesia.  Due to concern of allergic reaction unknown trigger however worsening in the last hour with the potential of worsening with airway obstruction, adductor catheter was pulled.     Solu-Medrol, Pepcid, Benadryl    11:17 PM  patient feels  well, upper lip swelling looks a bit worse now crossing the midline. Uvula large, patietn reports the throat feels better.  ENT consulted, no airway edema, likely swelling secondary to intubation trauma.    Patient w/ small bruise of the left side upper lip but non-tender, swelling got worse hours after the injury, including also after arrival in the ED w/ swelling crossing midline and uvula edema improved with prednisone/benadryl. I discussed w/ the patient that it is possible that the lip and uvula edema are secondary to the ETT injury, however, there are a few elements in his presentation that I would not expect in the picture of injury. Patient would like to go home, I advised the patient to closely monitor for worsening of symptoms and to return if any worsening. patient lives close by. He has prescriptions for pain control at home, knows the max daily dose of tylenol, aware of side effects of opiate medication, comfortable with the go home        IMPRESSION:  1.) Left upper lip edema, uvula edema      Dispo: Discharge    Critical Care Time: N/A       Cydney Rock MD  07/29/20 0259

## 2020-07-29 NOTE — PROGRESS NOTES
7/29/2020 1600    Received call from patient's girlfriend reporting that patient's Nimbus pump was turned off and PNC was removed last night in the ED due concern of lip swelling post op. Patient now experiencing an increase in pain to left knee as nerve block has worn off. Dr. Mcrae with anesthesia notified. Explained to patient that unfortunately once PNC is removed after discharge, anesthesia is unable to replace it. Encouraged patient to continue taking pain medication as needed to control pain. Verbalizes understanding.

## 2020-07-29 NOTE — PROGRESS NOTES
Please See Full Physical Therapy Evaluation in Plan of Care                                                          Physical Therapy Initial Evaluation     Name: Reji Pond Jr.  Clinic Number: [id number]    Therapy Diagnosis:   Encounter Diagnoses   Name Primary?    Acute internal derangement of left knee     Acute pain of left knee Yes    Decreased range of motion (ROM) of left knee     Decreased strength of lower extremity     Difficulty walking      Physician: Santo Shepherd MD    Physician Orders: PT Eval and Treat   Medical Diagnosis from Referral: Acute internal derangement of left knee  Evaluation Date: 7/29/2020  Authorization Period Expiration: 12/31/20  Plan of Care Expiration: 10/29/20  Visit # / Visits authorized: 1/ 20    Time In: 5:30pm  Time Out: 6:00pm  Total Billable Time: 30 minutes    Precautions:   DOS: 7/28/20 - Dr. Shepherd POD # 1  1. Left Arthroscopy, Osteochondral Allograft 39241  2.  Left Arthroscopy, with meniscectomy (medial OR lateral) 16728, Lateral  3.  Left Arthroscopy, debridement/shaving of articular cartilage (chondroplasty) 65619    PHYSICAL THERAPY:  Weight bearing:non weight bearing  left leg  Range of Motion:no motion for 1 weeks (5 Days)    Start CPM on postop day # 5-6 at  10 degrees hyperextension to 60 degrees flexion as tolerated for 6-8 hours per day for 4 weeks. Advancing to 120 degrees 5 degrees per day as tolerated.   If the CPM is required the patient is to be taken out of the CPM machine as much as possible.     Immobilizer if present should be locked @ -10 degrees with gait and allowed to flex to 60 degrees at rest.         Functional Limitations Reports - G Codes  Category: mobility  Tool: FOTO Knee Survey  Score: 99% Limitation   Modifier  Impairment Limitation Restriction    CH  0 % impaired, limited or restricted    CI  @ least 1% but less than 20% impaired, limited or restricted    CJ  @ least 20%<40% impaired, limited or restricted    CK  @ least  "40%<60% impaired, limited or restricted    CL  @ least 60% <80% impaired, limited or restricted    CM  @ least 80%<100% impaired limited or restricted    CN  100% impaired, limited or restricted     Current/  CM = % Limitation  Goal/ : CK = 40-60% Limitation     Pt/family was provided educational information, including: role of PT, goals for PT, scheduling - pt verbalized understanding. Discussed insurance limitations with pt.     TREATMENT     Treatment Time In: 5:45pm  Treatment Time Out: 6:00pm  Total Treatment time separate from Evaluation: 15 minutes    Reji received therapeutic exercises to develop strength, endurance, ROM, flexibility, posture and core stabilization for 10 minutes including:    Ankle Pumps 30x  Calf Str c/ strap in long-sitting 3x30"  HS Str in long-sitting 3x30"  Heel Prop 3 mins  Quad Set 2x10    Reji received the following manual therapy techniques: Joint mobilizations were applied to the: L knee for 5 minutes, including:  Patella mobs - all directions      Home Exercises and Patient Education Provided    Education provided:   Post-op precautions, rehab milestones    Written Home Exercises Provided: yes.  Exercises were reviewed and Reji was able to demonstrate them prior to the end of the session.  Reji demonstrated good  understanding of the education provided.     See EMR under Patient Instructions for exercises provided 7/29/2020.    Assessment     Pt's spiritual, cultural and educational needs considered and pt agreeable to plan of care and goals as stated below:     Short Term GOALS: 6 weeks. Pt agrees with goals set.  1. Patient demonstrates independence with HEP.   2. Patient demonstrates increased strength BLE's by 1/3 muscle grade or greater to improve tolerance to functional activities pain free.  3. Patient demonstrates increased 0 to 120 degrees to improve tolerance to functional activities pain free.   4. Patient will report pain of 4/10 at worst, on 0-10 " pain scale, with all activity  5. Patient demonstrates ability to walk 500 feet indep without AD, appropriate gait pattern, no pain provocation    Long Term GOALS: 12 weeks. Pt agrees with goals set.  1. Patient demonstrates increased L knee ROM symmetrical to R knee to improve tolerance to functional activities pain free.   2. Patient demonstrates increased strength BLE's to 4+/5 or greater to improve tolerance to functional activities pain free.   3. Patient demonstrates improved overall function per FOTO Knee Survey to 50% Limitation or less.   4. Patient will report pain of 2/10 at worst, on 0-10 pain scale, with all activity  5. Patient demonstrates ability to walk 1 mile indep without AD , proper gait pattern, no pain provocation  6. Patient demonstrates ability to perform typical work duties, without pain, appropriate movement pattern    PLAN     Plan of care Certification: 7/29/2020 to 10/29/20.    Outpatient Physical Therapy 3 times weekly for 12 weeks to include the following interventions: Electrical Stimulation IFC/NMES, Gait Training, Iontophoresis (with Dexamethasone), Manual Therapy, Moist Heat/ Ice, Neuromuscular Re-ed, Patient Education, Self Care, Therapeutic Activites, Therapeutic Exercise, Ultrasound and Dry Needling.  Pt may be seen by PTA as part of the rehabilitation team.     Danish Carter, PT  7/29/2020    I have seen the patient, reviewed the therapist's plan of care, and I agree with the plan of care.      I certify the need for these services furnished under this plan of treatment and while under my care.     ___________________ ________ Physician/Referring Practitioner            ___________________________ Date of Signature

## 2020-07-29 NOTE — HPI
Mr. Pond is a 33 y/o male who underwent left knee surgery today that ended around 1-2PM. When he got home around 3PM, his girlfriend noticed some left upper lip swelling and that his uvula appeared long; therefore, he decided to present to the ED for evaluation. He did not try any treatments at home for this prior to arrival. He denies difficult breathing. No trouble swallowing. No voice changes. He has not tried any new medications or foods since surgery. He does not have any allergies and does not take lisinopril. He did have a catheter for local pain control in his knee that the ED removed out of concern that it was causing an allergic reaction.

## 2020-07-29 NOTE — CONSULTS
[facility]  Otorhinolaryngology-Head & Neck Surgery  Consult Note    Patient Name: Reji Pond Jr.  MRN: [id number]  Code Status: Prior  Admission Date: 7/28/2020  Hospital Length of Stay: 0 days  Attending Physician: Cydney Rock MD  Primary Care Provider: Enzo De La Garza MD    Patient information was obtained from patient, past medical records and ER records.     Inpatient consult to ENT  Consult performed by: Senia Garrison MD  Consult ordered by: Cydney Rock MD        Subjective:     Chief Complaint/Reason for Admission: lip swelling     History of Present Illness: Mr. Pond is a 35 y/o male who underwent left knee surgery today that ended around 1-2PM. When he got home around 3PM, his girlfriend noticed some left upper lip swelling and that his uvula appeared long; therefore, he decided to present to the ED for evaluation. He did not try any treatments at home for this prior to arrival. He denies difficult breathing. No trouble swallowing. No voice changes. He has not tried any new medications or foods since surgery. He does not have any allergies and does not take lisinopril. He did have a catheter for local pain control in his knee that the ED removed out of concern that it was causing an allergic reaction.     Medications:  Continuous Infusions:  Scheduled Meds:  PRN Meds:     Current Facility-Administered Medications on File Prior to Encounter   Medication    [COMPLETED] acetaminophen tablet 1,000 mg    [COMPLETED] celecoxib capsule 400 mg    [DISCONTINUED] 0.9%  NaCl infusion    [DISCONTINUED] ceFAZolin injection 2 g    [DISCONTINUED] dexamethasone injection    [DISCONTINUED] dexMEDEtomidine injection    [DISCONTINUED] electrolyte-S (ISOLYTE)    [DISCONTINUED] EPINEPHrine injection    [DISCONTINUED] famotidine (PF) injection    [DISCONTINUED] fentaNYL injection 25 mcg    [DISCONTINUED] fentaNYL injection 25 mcg    [DISCONTINUED] fentaNYL injection     [DISCONTINUED] glycopyrrolate injection    [DISCONTINUED] ketamine in 0.9 % sod chloride 100 mg/10 mL (10 mg/mL) injection    [DISCONTINUED] lidocaine (cardiac) injection    [DISCONTINUED] midazolam (VERSED) 1 mg/mL injection 0.5 mg    [DISCONTINUED] midazolam injection    [DISCONTINUED] morphine injection 2 mg    [DISCONTINUED] mupirocin 2 % ointment    [DISCONTINUED] ondansetron injection 4 mg    [DISCONTINUED] ondansetron injection    [DISCONTINUED] oxyCODONE immediate release tablet 10 mg    [DISCONTINUED] oxyCODONE immediate release tablet 10 mg    [DISCONTINUED] phenylephrine injection    [DISCONTINUED] promethazine tablet 25 mg    [DISCONTINUED] propofol (DIPRIVAN) 10 mg/mL infusion    [DISCONTINUED] rocuronium injection    [DISCONTINUED] ropivacaine (Naropin) 1000 mg/500 mL (2 mg/mL) [facility] PainPRO Pump infusion    [DISCONTINUED] ropivacaine-epi-clonid-ketorol (TRACI) 2.46-0.005- 0.0008-0.3mg/mL solution Syrg    [DISCONTINUED] ropivacaine-epi-clonid-ketorol (TRACI) 2.46-0.005- 0.0008-0.3mg/mL solution Syrg    [DISCONTINUED] sodium chloride 0.9% flush 3 mL    [DISCONTINUED] succinylcholine injection    [DISCONTINUED] traMADoL tablet 100 mg     Current Outpatient Medications on File Prior to Encounter   Medication Sig    albuterol (PROVENTIL/VENTOLIN HFA) 90 mcg/actuation inhaler Inhale 1-2 puffs into the lungs every 6 (six) hours as needed. Rescue    aspirin (ECOTRIN) 325 MG EC tablet Take 1 tablet (325 mg total) by mouth once daily.    celecoxib (CELEBREX) 200 MG capsule Take 1 capsule (200 mg total) by mouth 2 (two) times daily.    celecoxib (CELEBREX) 200 MG capsule Take 1 capsule (200 mg total) by mouth 2 (two) times daily.    diclofenac (VOLTAREN) 75 MG EC tablet Take 1 tablet (75 mg total) by mouth 2 (two) times daily.    meclizine (ANTIVERT) 25 mg tablet Take 1 tablet (25 mg total) by mouth 3 (three) times daily as needed.    oxyCODONE-acetaminophen (PERCOCET)  mg per  tablet Take 1 tablet by mouth every 6 (six) hours as needed.    promethazine (PHENERGAN) 25 MG tablet Take 1 tablet (25 mg total) by mouth every 6 (six) hours as needed.       Review of patient's allergies indicates:  No Known Allergies    Past Medical History:   Diagnosis Date    Elevated liver function tests 10/25/2018    Fatty liver 10/25/2018    Hypertension     Kidney stones      No past surgical history on file.  Family History     Problem Relation (Age of Onset)    Heart failure Father        Tobacco Use    Smoking status: Former Smoker    Smokeless tobacco: Never Used   Substance and Sexual Activity    Alcohol use: Yes     Frequency: 2-3 times a week     Drinks per session: 3 or 4     Binge frequency: Never     Comment: occasionally     Drug use: No    Sexual activity: Not on file     Review of Systems   Constitutional: Negative for chills and fever.   HENT: Positive for facial swelling. Negative for congestion, drooling, ear pain, nosebleeds, sore throat, trouble swallowing and voice change.    Eyes: Negative for visual disturbance.   Respiratory: Negative for shortness of breath and stridor.    Cardiovascular: Negative for chest pain.   Gastrointestinal: Negative for abdominal pain, nausea and vomiting.   Genitourinary: Negative for difficulty urinating.   Musculoskeletal: Negative for neck pain.   Skin: Positive for wound.   Allergic/Immunologic: Negative for immunocompromised state.   Neurological: Negative for dizziness and headaches.   Hematological: Does not bruise/bleed easily.   Psychiatric/Behavioral: Negative for decreased concentration and dysphoric mood. The patient is not nervous/anxious.      Objective:     Vital Signs (Most Recent):  Temp: 98.2 °F (36.8 °C) (07/28/20 2029)  Pulse: 87 (07/28/20 2046)  Resp: 18 (07/28/20 2352)  BP: (!) 178/114 (07/28/20 2046)  SpO2: 97 % (07/28/20 2046) Vital Signs (24h Range):  Temp:  [98.2 °F (36.8 °C)-98.8 °F (37.1 °C)] 98.2 °F (36.8 °C)  Pulse:   [65-94] 87  Resp:  [0-20] 18  SpO2:  [97 %-100 %] 97 %  BP: (117-207)/() 178/114     Weight: 103 kg (227 lb)  Body mass index is 30.79 kg/m².        Physical Exam   Constitutional: Well appearing/communicating. Voice clear. NAD.  Eyes: EOM I Bilaterally  Head/Face: Normocephalic.  Negative paranasal sinus pressure/tenderness.  Salivary glands WNL.  House Brackmann I Bilaterally.  Right Ear: External Auditory Canal WNL.  Auricle WNL.  Left Ear: External Auditory Canal WNL. Auricle WNL.  Nose: No gross nasal septal deviation. Inferior Turbinates 2+ bilaterally. No septal perforation. No masses/lesions. External nasal skin without masses/lesions.  Oral Cavity: Left upper lip swelling that is tender to touch. Small laceration on mucosa with bruising.  FOM Soft, no masses palpated. Oral Tongue mobile. Hard Palate WNL.   Oropharynx: Tonsillar fossa/pharyngeal wall without lesions. Posterior oropharynx WNL.  Soft palate without masses. Midline uvula without edema, does appear long, small abrasion with white granulation tissue at tip.  Neck/Lymphatic: No LAD I-VI bilaterally.  No thyromegaly.  No masses noted on exam.  Neuro/Psychiatric: AOx3.  Normal mood and affect.   Respiratory: Normal respiratory effort, no stridor/stertor, no retractions noted.    Flexible Fiberoptic Laryngoscopy   Nasopharynx - the torus is clear. There are no lesions of the posterior wall.   Oropharynx - no lesions of the tongue base. There is no obvious fullness or asymmetry.  Hypopharynx - there are no lesions of the pyriform sinuses or postcricoid region    Larynx - there are no lesions of the supraglottic or glottic larynx. Vocal fold mobility is normal with complete closure.             Significant Labs:  COVID negative    Significant Diagnostics:  None    Assessment/Plan:     Lip injury, initial encounter  35 y/o male with left upper lip swelling that is consistent with intubation injury. Airway clinically stable without any sign of  compromise. Flexible laryngoscopy unremarkable.     -okay for discharge from ENT standpoint   -recommend ice pack to upper lip for swelling   -patient will need alternative pain control   -call ENT with questions       VTE Risk Mitigation (From admission, onward)    None          Thank you for your consult. I will sign off. Please contact us if you have any additional questions.    Senia Garrison MD  Otorhinolaryngology-Head & Neck Surgery  [facility]

## 2020-07-29 NOTE — SUBJECTIVE & OBJECTIVE
Medications:  Continuous Infusions:  Scheduled Meds:  PRN Meds:     Current Facility-Administered Medications on File Prior to Encounter   Medication    [COMPLETED] acetaminophen tablet 1,000 mg    [COMPLETED] celecoxib capsule 400 mg    [DISCONTINUED] 0.9%  NaCl infusion    [DISCONTINUED] ceFAZolin injection 2 g    [DISCONTINUED] dexamethasone injection    [DISCONTINUED] dexMEDEtomidine injection    [DISCONTINUED] electrolyte-S (ISOLYTE)    [DISCONTINUED] EPINEPHrine injection    [DISCONTINUED] famotidine (PF) injection    [DISCONTINUED] fentaNYL injection 25 mcg    [DISCONTINUED] fentaNYL injection 25 mcg    [DISCONTINUED] fentaNYL injection    [DISCONTINUED] glycopyrrolate injection    [DISCONTINUED] ketamine in 0.9 % sod chloride 100 mg/10 mL (10 mg/mL) injection    [DISCONTINUED] lidocaine (cardiac) injection    [DISCONTINUED] midazolam (VERSED) 1 mg/mL injection 0.5 mg    [DISCONTINUED] midazolam injection    [DISCONTINUED] morphine injection 2 mg    [DISCONTINUED] mupirocin 2 % ointment    [DISCONTINUED] ondansetron injection 4 mg    [DISCONTINUED] ondansetron injection    [DISCONTINUED] oxyCODONE immediate release tablet 10 mg    [DISCONTINUED] oxyCODONE immediate release tablet 10 mg    [DISCONTINUED] phenylephrine injection    [DISCONTINUED] promethazine tablet 25 mg    [DISCONTINUED] propofol (DIPRIVAN) 10 mg/mL infusion    [DISCONTINUED] rocuronium injection    [DISCONTINUED] ropivacaine (Naropin) 1000 mg/500 mL (2 mg/mL) [facility] Pain[facility] Pump infusion    [DISCONTINUED] ropivacaine-epi-clonid-ketorol (TRACI) 2.46-0.005- 0.0008-0.3mg/mL solution Syrg    [DISCONTINUED] ropivacaine-epi-clonid-ketorol (TRACI) 2.46-0.005- 0.0008-0.3mg/mL solution Syrg    [DISCONTINUED] sodium chloride 0.9% flush 3 mL    [DISCONTINUED] succinylcholine injection    [DISCONTINUED] traMADoL tablet 100 mg     Current Outpatient Medications on File Prior to Encounter   Medication Sig    albuterol  (PROVENTIL/VENTOLIN HFA) 90 mcg/actuation inhaler Inhale 1-2 puffs into the lungs every 6 (six) hours as needed. Rescue    aspirin (ECOTRIN) 325 MG EC tablet Take 1 tablet (325 mg total) by mouth once daily.    celecoxib (CELEBREX) 200 MG capsule Take 1 capsule (200 mg total) by mouth 2 (two) times daily.    celecoxib (CELEBREX) 200 MG capsule Take 1 capsule (200 mg total) by mouth 2 (two) times daily.    diclofenac (VOLTAREN) 75 MG EC tablet Take 1 tablet (75 mg total) by mouth 2 (two) times daily.    meclizine (ANTIVERT) 25 mg tablet Take 1 tablet (25 mg total) by mouth 3 (three) times daily as needed.    oxyCODONE-acetaminophen (PERCOCET)  mg per tablet Take 1 tablet by mouth every 6 (six) hours as needed.    promethazine (PHENERGAN) 25 MG tablet Take 1 tablet (25 mg total) by mouth every 6 (six) hours as needed.       Review of patient's allergies indicates:  No Known Allergies    Past Medical History:   Diagnosis Date    Elevated liver function tests 10/25/2018    Fatty liver 10/25/2018    Hypertension     Kidney stones      No past surgical history on file.  Family History     Problem Relation (Age of Onset)    Heart failure Father        Tobacco Use    Smoking status: Former Smoker    Smokeless tobacco: Never Used   Substance and Sexual Activity    Alcohol use: Yes     Frequency: 2-3 times a week     Drinks per session: 3 or 4     Binge frequency: Never     Comment: occasionally     Drug use: No    Sexual activity: Not on file     Review of Systems   Constitutional: Negative for chills and fever.   HENT: Positive for facial swelling. Negative for congestion, drooling, ear pain, nosebleeds, sore throat, trouble swallowing and voice change.    Eyes: Negative for visual disturbance.   Respiratory: Negative for shortness of breath and stridor.    Cardiovascular: Negative for chest pain.   Gastrointestinal: Negative for abdominal pain, nausea and vomiting.   Genitourinary: Negative for  difficulty urinating.   Musculoskeletal: Negative for neck pain.   Skin: Positive for wound.   Allergic/Immunologic: Negative for immunocompromised state.   Neurological: Negative for dizziness and headaches.   Hematological: Does not bruise/bleed easily.   Psychiatric/Behavioral: Negative for decreased concentration and dysphoric mood. The patient is not nervous/anxious.      Objective:     Vital Signs (Most Recent):  Temp: 98.2 °F (36.8 °C) (07/28/20 2029)  Pulse: 87 (07/28/20 2046)  Resp: 18 (07/28/20 2352)  BP: (!) 178/114 (07/28/20 2046)  SpO2: 97 % (07/28/20 2046) Vital Signs (24h Range):  Temp:  [98.2 °F (36.8 °C)-98.8 °F (37.1 °C)] 98.2 °F (36.8 °C)  Pulse:  [65-94] 87  Resp:  [0-20] 18  SpO2:  [97 %-100 %] 97 %  BP: (117-207)/() 178/114     Weight: 103 kg (227 lb)  Body mass index is 30.79 kg/m².        Physical Exam   Constitutional: Well appearing/communicating. Voice clear. NAD.  Eyes: EOM I Bilaterally  Head/Face: Normocephalic.  Negative paranasal sinus pressure/tenderness.  Salivary glands WNL.  House Brackmann I Bilaterally.  Right Ear: External Auditory Canal WNL.  Auricle WNL.  Left Ear: External Auditory Canal WNL. Auricle WNL.  Nose: No gross nasal septal deviation. Inferior Turbinates 2+ bilaterally. No septal perforation. No masses/lesions. External nasal skin without masses/lesions.  Oral Cavity: Left upper lip swelling that is tender to touch. Small laceration on mucosa with bruising.  FOM Soft, no masses palpated. Oral Tongue mobile. Hard Palate WNL.   Oropharynx: Tonsillar fossa/pharyngeal wall without lesions. Posterior oropharynx WNL.  Soft palate without masses. Midline uvula without edema, does appear long, small abrasion with white granulation tissue at tip.  Neck/Lymphatic: No LAD I-VI bilaterally.  No thyromegaly.  No masses noted on exam.  Neuro/Psychiatric: AOx3.  Normal mood and affect.   Respiratory: Normal respiratory effort, no stridor/stertor, no retractions  noted.    Flexible Fiberoptic Laryngoscopy   Nasopharynx - the torus is clear. There are no lesions of the posterior wall.   Oropharynx - no lesions of the tongue base. There is no obvious fullness or asymmetry.  Hypopharynx - there are no lesions of the pyriform sinuses or postcricoid region    Larynx - there are no lesions of the supraglottic or glottic larynx. Vocal fold mobility is normal with complete closure.             Significant Labs:  COVID negative    Significant Diagnostics:  None

## 2020-07-29 NOTE — ED TRIAGE NOTES
Pt reports today he had surgery on his L knee for a meniscus tear around noon today, states that he was intubated today for the procedure. Reports that he has pain to the top of his mouth but noticed when he got home this evening around 1545, began to have swelling to his uvula and upper lip swelling on the L side that worsened over the course of the afternoon after discharge. Pt denies SOB/trouble swallowing.    Patient Identifiers for Reji Pond  checked and correct  LOC: The patient is awake, alert and aware of environment with an appropriate affect, the patient is oriented x 3 and speaking appropriate. Swelling to upper lip on L side, swelling to uvula, no signs of airway compromise  APPEARANCE: Patient resting comfortably and in no acute distress, patient is clean and well groomed, patient's clothing is properly fastened.  SKIN: The skin is warm and dry, patient has normal skin turgor and moist mucus membranes,no rashes or lesions.Skin Intact , No Breakdown Noted  Musculoskeletal :  Normal range of motion noted. Moves all extremeties well, No swelling or tenderness noted. Immobilizer to LLE  RESPIRATORY: Airway is open and patent, respirations are spontaneous, patient has a normal effort and rate.  CARDIAC: Patient has a normal rate and rhythm, no periphreal edema noted, capillary refill < 3 seconds.   ABDOMEN: Soft and non tender to palpation, no distention noted.  PULSES: 2+  And symmetrical in all extremeties  NEUROLOGIC: PERRL. facial expression is symmetrical, patient moving all extremities, normal sensation in all extremities when touched with a finger.The patient is awake, alert and cooperative with a calm affect, patient is aware of environment.    Will continue to monitor

## 2020-07-29 NOTE — ASSESSMENT & PLAN NOTE
35 y/o male with left upper lip swelling that is consistent with intubation injury. Airway clinically stable without any sign of compromise. Flexible laryngoscopy unremarkable.     -okay for discharge from ENT standpoint   -recommend ice pack to upper lip for swelling   -patient will need alternative pain control   -call ENT with questions

## 2020-07-29 NOTE — ANESTHESIA POSTPROCEDURE EVALUATION
Anesthesia Post Evaluation    Patient: Reji Pond Jr.    Procedure(s) Performed: Procedure(s) (LRB):  REPAIR, KNEE, ARTHROSCOPIC, WITH OSTEOCHONDRAL GRAFT TRANSFER (Left)  ARTHROSCOPY, KNEE, WITH MENISCECTOMY (Left)  CHONDROPLASTY, KNEE (Left)    Final Anesthesia Type: general    Patient location during evaluation: PACU  Patient participation: Yes- Able to Participate  Level of consciousness: awake and alert and oriented  Post-procedure vital signs: reviewed and stable  Pain management: adequate  Airway patency: patent    PONV status at discharge: No PONV  Anesthetic complications: no      Cardiovascular status: blood pressure returned to baseline and hemodynamically stable  Respiratory status: unassisted, spontaneous ventilation and room air  Hydration status: euvolemic  Follow-up not needed.          Vitals Value Taken Time   /73 07/28/20 1531   Temp 36.9 °C (98.5 °F) 07/28/20 1345   Pulse 72 07/28/20 1549   Resp 17 07/28/20 1549   SpO2 100 % 07/28/20 1549   Vitals shown include unvalidated device data.      Event Time   Out of Recovery 15:00:00         Pain/Ankit Score: Pain Rating Prior to Med Admin: 10 (7/28/2020 11:52 PM)  Pain Rating Post Med Admin: 6 (7/28/2020  3:30 PM)  Ankit Score: 9 (7/28/2020  2:45 PM)

## 2020-07-29 NOTE — DISCHARGE INSTRUCTIONS
Please closely watch for worsening of the lip edema or reoccurrence of the throat swelling, if you notice any worsening of your symptoms, please return to the ED. You can apply ice to the left upper lip for the swelling.

## 2020-07-29 NOTE — TELEPHONE ENCOUNTER
I spoke to Mr. Reji Pond Jr. Girlfriend and she explained that when the patient was seen in the ED the OnQ was removed due to concern for lip swelling. However, on review of the chart it was determined that the patient's lip was swelling due to bruising after intubation. Message sent to anesthesia to see if it is possible for the patient to be seen to have the OnQ reinserted today    Will follow up with the patient.    ----- Message from Sejal James sent at 7/29/2020  9:06 AM CDT -----  Regarding: Puja Shepherd pt had surgery yesterday he has problems regarding a pump it was taken out wants to know if you guys can put it back in.    Asking for a call back     Contact info [phone]

## 2020-08-03 ENCOUNTER — CLINICAL SUPPORT (OUTPATIENT)
Dept: REHABILITATION | Facility: HOSPITAL | Age: 35
End: 2020-08-03
Attending: ORTHOPAEDIC SURGERY
Payer: COMMERCIAL

## 2020-08-03 DIAGNOSIS — M25.662 DECREASED RANGE OF MOTION (ROM) OF LEFT KNEE: ICD-10-CM

## 2020-08-03 DIAGNOSIS — M23.92 ACUTE INTERNAL DERANGEMENT OF LEFT KNEE: ICD-10-CM

## 2020-08-03 DIAGNOSIS — R26.2 DIFFICULTY WALKING: ICD-10-CM

## 2020-08-03 DIAGNOSIS — M25.562 ACUTE PAIN OF LEFT KNEE: Primary | ICD-10-CM

## 2020-08-03 DIAGNOSIS — R29.898 DECREASED STRENGTH OF LOWER EXTREMITY: ICD-10-CM

## 2020-08-03 PROCEDURE — 97110 THERAPEUTIC EXERCISES: CPT | Mod: PN

## 2020-08-03 NOTE — PROGRESS NOTES
Physical Therapy Daily Treatment Note     Name: Reji Pond Jr.  Clinic Number: [id number]    Therapy Diagnosis:   Encounter Diagnoses   Name Primary?    Decreased range of motion (ROM) of left knee     Decreased strength of lower extremity     Difficulty walking     Acute pain of left knee Yes    Acute internal derangement of left knee      Physician: Santo Shepherd MD    Visit Date: 8/3/2020    Physician Orders: PT Eval and Treat   Medical Diagnosis from Referral: Acute internal derangement of left knee  Evaluation Date: 8/3/2020  Authorization Period Expiration: 12/31/20  Plan of Care Expiration: 10/29/20  Visit # / Visits authorized: 2/ 20    Time In: 1:45pm  Time Out: 2:20pm  Total Billable Time: 40 minutes    Precautions:   DOS: 7/28/20 - Dr. Shepherd POW # 1  1. Left Arthroscopy, Osteochondral Allograft 79622  2.  Left Arthroscopy, with meniscectomy (medial OR lateral) 53202, Lateral  3.  Left Arthroscopy, debridement/shaving of articular cartilage (chondroplasty) 97169    PHYSICAL THERAPY:  Weight bearing:non weight bearing  left leg  Range of Motion:no motion for 1 weeks (5 Days)  Start CPM on postop day # 5-6 at  10 degrees hyperextension to 60 degrees flexion as tolerated for 6-8 hours per day for 4 weeks. Advancing to 120 degrees 5 degrees per day as tolerated.   If the CPM is required the patient is to be taken out of the CPM machine as much as possible.\      Immobilizer if present should be locked @ -10 degrees with gait and allowed to flex to 60 degrees at rest.        Subjective     Pt reports: he is relatively pain-free currently, just took pain medication prior to session. Took it easy this weekend.  He was compliant with home exercise program.  Response to previous treatment: decreased pain  Functional change: improved mobility with crutches    Pain: 0/10  Location: left knee      Objective     Range of Motion: Knee    Left Right   Flexion: 60 130   Extension 0 0     Reji henderson  "therapeutic exercises to develop strength, endurance, ROM, flexibility, posture and core stabilization for 40 minutes including:    Ankle Pumps 45x   Calf Str c/ strap in long-sitting 3x30"  HS Str in long-sitting 3x30"  Heel Prop 3 mins  Quad Set c/ NMES [place] 10 on/off 9 mins    SLR - next session  SL Hip Abd -next session  Seated L Knee Flxion PROM PT assist 5 mins  Heel Slide c/ sheet and board 10x 10" hold    Reji received the following manual therapy techniques: Joint mobilizations were applied to the: L knee for 5 minutes, including:  Patella mobs - all directions      Reji received the following supervised modalities after being cleared for contradictions: NMES Electrical Stimulation:  Reji received NMES Electrical Stimulation to elicit muscle contraction of the L quad. Pt received stimulation at a rate of 50 pps with asymmetric current, ramp of 2 seconds with 10 second on time and 10 second off time. Patient tolerated treatment well without any adverse effects.       Reji received cold pack for 10 minutes to L knee.      Home Exercises Provided and Patient Education Provided     Education provided:   Written Home Exercises Provided: yes.  Exercises were reviewed and Reji was able to demonstrate them prior to the end of the session.  Reji demonstrated good  understanding of the education provided.     See EMR under Patient Instructions for exercises provided prior visit.    Assessment     Pt tolerated treatment session very well, with no adverse events throughout. Surgical bandage intact and dry, no signs of active infection. Tubigrip provided for edema relief and reduction of shear force on surgical incision. Able to achieve 60 deg knee flexion AAROM/PROM easily without pain. Improving quad activation with NMES.    Reji is progressing well towards his goals.   Pt prognosis is Good.     Pt will continue to benefit from skilled outpatient physical therapy to address the deficits listed in the problem " list box on initial evaluation, provide pt/family education and to maximize pt's level of independence in the home and community environment.   Pt's spiritual, cultural and educational needs considered and pt agreeable to plan of care and goals.     Anticipated barriers to physical therapy: COVID-19 Concerns    Goals:     Short Term GOALS: 6 weeks. Pt agrees with goals set.   1. Patient demonstrates independence with HEP. In progress  2. Patient demonstrates increased strength BLE's by 1/3 muscle grade or greater to improve tolerance to functional activities pain free. In progress  3. Patient demonstrates increased 0 to 120 degrees to improve tolerance to functional activities pain free. In progress  4. Patient will report pain of 4/10 at worst, on 0-10 pain scale, with all activity In progress  5. Patient demonstrates ability to walk 500 feet indep without AD, appropriate gait pattern, no pain provocation In progress    Long Term GOALS: 12 weeks. Pt agrees with goals set.In progress  1. Patient demonstrates increased L knee ROM symmetrical to R knee to improve tolerance to functional activities pain free.   2. Patient demonstrates increased strength BLE's to 4+/5 or greater to improve tolerance to functional activities pain free.   3. Patient demonstrates improved overall function per FOTO Knee Survey to 50% Limitation or less.   4. Patient will report pain of 2/10 at worst, on 0-10 pain scale, with all activity  5. Patient demonstrates ability to walk 1 mile indep without AD , proper gait pattern, no pain provocation  6. Patient demonstrates ability to perform typical work duties, without pain, appropriate movement pattern    Plan     Emphasize full knee extension and quad control, progress flexion ROM as tolerated    Danish Carter, PT

## 2020-08-04 DIAGNOSIS — M22.42 CHONDROMALACIA, PATELLA, LEFT: ICD-10-CM

## 2020-08-04 DIAGNOSIS — S83.282D OTHER TEAR OF LATERAL MENISCUS OF LEFT KNEE AS CURRENT INJURY, SUBSEQUENT ENCOUNTER: ICD-10-CM

## 2020-08-04 DIAGNOSIS — M94.262 CHONDROMALACIA OF LEFT KNEE: ICD-10-CM

## 2020-08-04 RX ORDER — OXYCODONE AND ACETAMINOPHEN 10; 325 MG/1; MG/1
1 TABLET ORAL EVERY 6 HOURS PRN
Qty: 28 TABLET | Refills: 0 | Status: SHIPPED | OUTPATIENT
Start: 2020-08-04 | End: 2020-08-11 | Stop reason: SDUPTHER

## 2020-08-04 RX ORDER — TRAMADOL HYDROCHLORIDE 50 MG/1
50 TABLET ORAL EVERY 6 HOURS PRN
Qty: 28 EACH | Refills: 0 | Status: SHIPPED | OUTPATIENT
Start: 2020-08-04 | End: 2020-08-11 | Stop reason: SDUPTHER

## 2020-08-04 NOTE — TELEPHONE ENCOUNTER
Spoke to patient about message below and explained to him that the medications have been sent to the providers and are awaiting their approval.     ----- Message from Jose Moon sent at 8/4/2020  1:33 PM CDT -----  Pt asking for a callback regarding pain medication it has not been called into the pharmacy yet and pt is in pain he's asking is there a problem or is something wrong please             Contact info [phone]

## 2020-08-04 NOTE — TELEPHONE ENCOUNTER
----- Message from Donnie Willis sent at 8/4/2020  9:09 AM CDT -----  Contact: pt girlfriend/Puja  Please call pt anel Luo at [phone]    Refill request for oxyCODONE-acetaminophen (PERCOCET)  mg per tablet & traMADoL (ULTRAM) 50 mg tablet    [id number] [place] [address] AT [facility] [address] [phone] (Phone)  [phone] (Fax)    Also questions regarding the patient physical limitations    Thank you

## 2020-08-05 ENCOUNTER — TELEPHONE (OUTPATIENT)
Dept: SPORTS MEDICINE | Facility: CLINIC | Age: 35
End: 2020-08-05

## 2020-08-05 NOTE — TELEPHONE ENCOUNTER
Received FMLA forms from  [facility]. Forms completed and faxed to them at [phone].    Colleen Amedee MA [facility]

## 2020-08-06 ENCOUNTER — CLINICAL SUPPORT (OUTPATIENT)
Dept: REHABILITATION | Facility: HOSPITAL | Age: 35
End: 2020-08-06
Attending: ORTHOPAEDIC SURGERY
Payer: COMMERCIAL

## 2020-08-06 DIAGNOSIS — R29.898 DECREASED STRENGTH OF LOWER EXTREMITY: ICD-10-CM

## 2020-08-06 DIAGNOSIS — M25.662 DECREASED RANGE OF MOTION (ROM) OF LEFT KNEE: ICD-10-CM

## 2020-08-06 DIAGNOSIS — R26.2 DIFFICULTY WALKING: ICD-10-CM

## 2020-08-06 DIAGNOSIS — M25.562 ACUTE PAIN OF LEFT KNEE: Primary | ICD-10-CM

## 2020-08-06 DIAGNOSIS — M23.92 ACUTE INTERNAL DERANGEMENT OF LEFT KNEE: ICD-10-CM

## 2020-08-06 PROCEDURE — 97110 THERAPEUTIC EXERCISES: CPT | Mod: PN

## 2020-08-06 NOTE — PROGRESS NOTES
Physical Therapy Daily Treatment Note     Name: Reji Pond Jr.  Clinic Number: [id number]    Therapy Diagnosis:   Encounter Diagnoses   Name Primary?    Decreased range of motion (ROM) of left knee     Decreased strength of lower extremity     Difficulty walking     Acute pain of left knee Yes    Acute internal derangement of left knee      Physician: Santo Shepherd MD    Visit Date: 8/6/2020    Physician Orders: PT Eval and Treat   Medical Diagnosis from Referral: Acute internal derangement of left knee  Evaluation Date: 8/3/2020  Authorization Period Expiration: 12/31/20  Plan of Care Expiration: 10/29/20  Visit # / Visits authorized: 3 / 20    Time In: 1115  Time Out: 1210pm  Total Billable Time: 45 minutes    Precautions:   DOS: 7/28/20 - Dr. Shepherd POW # 1  1. Left Arthroscopy, Osteochondral Allograft 89179  2.  Left Arthroscopy, with meniscectomy (medial OR lateral) 23855, Lateral  3.  Left Arthroscopy, debridement/shaving of articular cartilage (chondroplasty) 85748    PHYSICAL THERAPY:  Weight bearing:non weight bearing  left leg  Range of Motion:no motion for 1 weeks (5 Days)  Start CPM on postop day # 5-6 at  10 degrees hyperextension to 60 degrees flexion as tolerated for 6-8 hours per day for 4 weeks. Advancing to 120 degrees 5 degrees per day as tolerated.   If the CPM is required the patient is to be taken out of the CPM machine as much as possible.\      Immobilizer if present should be locked @ -10 degrees with gait and allowed to flex to 60 degrees at rest.        Subjective     Pt reports: feeling increased pain at lateral knee today.  He was compliant with home exercise program.  Response to previous treatment: decreased pain  Functional change: improved mobility with crutches    Pain: 6/10  Location: left knee      Objective     Range of Motion: Knee    Left Right   Flexion: 60 130   Extension 0 0     Reji received therapeutic exercises to develop strength, endurance, ROM,  "flexibility, posture and core stabilization for 40 minutes including:    Seated L Knee Flxion PROM PT assist 5 mins  Heel Slide c/ sheet and board 15x 10" hold  Ankle Pumps 45x   Calf Str c/ strap in long-sitting 3x30"  HS Str in long-sitting 3x30"   Heel Prop 5# 3 mins  Quad Set c/ NMES [place] 10 on/off 8 mins  SLR 2x10    SL Hip Abd -next session      Reji received the following manual therapy techniques: Joint mobilizations were applied to the: L knee for 5 minutes, including:  Patella mobs - all directions      Reji received the following supervised modalities after being cleared for contradictions: NMES Electrical Stimulation:  Reji received NMES Electrical Stimulation to elicit muscle contraction of the L quad. Pt received stimulation at a rate of 50 pps with asymmetric current, ramp of 2 seconds with 10 second on time and 10 second off time. Patient tolerated treatment well without any adverse effects.       Reji received cold pack for 10 minutes to L knee.      Home Exercises Provided and Patient Education Provided     Education provided:   Written Home Exercises Provided: yes.  Exercises were reviewed and Reji was able to demonstrate them prior to the end of the session.  Reji demonstrated good  understanding of the education provided.     See EMR under Patient Instructions for exercises provided prior visit.    Assessment     Pt tolerated treatment session very well, with no adverse events throughout. Continues to respond well to NMES of L quad, difficulty with achieving full heel lift during isometric contraction. Able to perform indep SLR with brace donned, no significant pain noted, cueing for eccentric control provided. Pt progressing appropriately per post-op protocol    Reji is progressing well towards his goals.   Pt prognosis is Good.     Pt will continue to benefit from skilled outpatient physical therapy to address the deficits listed in the problem list box on initial evaluation, provide " pt/family education and to maximize pt's level of independence in the home and community environment.   Pt's spiritual, cultural and educational needs considered and pt agreeable to plan of care and goals.     Anticipated barriers to physical therapy: COVID-19 Concerns    Goals:     Short Term GOALS: 6 weeks. Pt agrees with goals set.   1. Patient demonstrates independence with HEP. In progress  2. Patient demonstrates increased strength BLE's by 1/3 muscle grade or greater to improve tolerance to functional activities pain free. In progress  3. Patient demonstrates increased 0 to 120 degrees to improve tolerance to functional activities pain free. In progress  4. Patient will report pain of 4/10 at worst, on 0-10 pain scale, with all activity In progress  5. Patient demonstrates ability to walk 500 feet indep without AD, appropriate gait pattern, no pain provocation In progress    Long Term GOALS: 12 weeks. Pt agrees with goals set.In progress  1. Patient demonstrates increased L knee ROM symmetrical to R knee to improve tolerance to functional activities pain free.   2. Patient demonstrates increased strength BLE's to 4+/5 or greater to improve tolerance to functional activities pain free.   3. Patient demonstrates improved overall function per FOTO Knee Survey to 50% Limitation or less.   4. Patient will report pain of 2/10 at worst, on 0-10 pain scale, with all activity  5. Patient demonstrates ability to walk 1 mile indep without AD , proper gait pattern, no pain provocation  6. Patient demonstrates ability to perform typical work duties, without pain, appropriate movement pattern    Plan     Emphasize full knee extension and quad control, progress flexion ROM as tolerated    Danish Carter, PT

## 2020-08-10 ENCOUNTER — CLINICAL SUPPORT (OUTPATIENT)
Dept: REHABILITATION | Facility: HOSPITAL | Age: 35
End: 2020-08-10
Attending: ORTHOPAEDIC SURGERY
Payer: COMMERCIAL

## 2020-08-10 DIAGNOSIS — M23.92 ACUTE INTERNAL DERANGEMENT OF LEFT KNEE: ICD-10-CM

## 2020-08-10 DIAGNOSIS — M25.662 DECREASED RANGE OF MOTION (ROM) OF LEFT KNEE: ICD-10-CM

## 2020-08-10 DIAGNOSIS — R26.2 DIFFICULTY WALKING: ICD-10-CM

## 2020-08-10 DIAGNOSIS — R29.898 DECREASED STRENGTH OF LOWER EXTREMITY: ICD-10-CM

## 2020-08-10 DIAGNOSIS — M25.562 ACUTE PAIN OF LEFT KNEE: Primary | ICD-10-CM

## 2020-08-10 PROCEDURE — 97140 MANUAL THERAPY 1/> REGIONS: CPT | Mod: PN

## 2020-08-10 PROCEDURE — 97110 THERAPEUTIC EXERCISES: CPT | Mod: PN

## 2020-08-10 NOTE — PROGRESS NOTES
Physical Therapy Daily Treatment Note     Name: Reji Pond Jr.  Clinic Number: [id number]    Therapy Diagnosis:   Encounter Diagnoses   Name Primary?    Decreased range of motion (ROM) of left knee     Decreased strength of lower extremity     Difficulty walking     Acute pain of left knee Yes    Acute internal derangement of left knee      Physician: Santo Shepherd MD    Visit Date: 8/10/2020    Physician Orders: PT Eval and Treat   Medical Diagnosis from Referral: Acute internal derangement of left knee  Evaluation Date: 8/3/2020  Authorization Period Expiration: 12/31/20  Plan of Care Expiration: 10/29/20  Visit # / Visits authorized: 4 / 20    Time In: 9:18 am  Time Out: 10: 12 am   Total Billable Time: 44 minutes    Precautions:   DOS: 7/28/20 - Dr. Shepherd POW # 1  1. Left Arthroscopy, Osteochondral Allograft 63077  2.  Left Arthroscopy, with meniscectomy (medial OR lateral) 03386, Lateral  3.  Left Arthroscopy, debridement/shaving of articular cartilage (chondroplasty) 07570    PHYSICAL THERAPY:  Weight bearing:non weight bearing  left leg  Range of Motion:no motion for 1 weeks (5 Days)  Start CPM on postop day # 5-6 at  10 degrees hyperextension to 60 degrees flexion as tolerated for 6-8 hours per day for 4 weeks. Advancing to 120 degrees 5 degrees per day as tolerated.   If the CPM is required the patient is to be taken out of the CPM machine as much as possible.\      Immobilizer if present should be locked @ -10 degrees with gait and allowed to flex to 60 degrees at rest.        Subjective     Pt reports: improved but continued L lateral knee pain. Using ice machine frequently and following post surgical precautions.   He was compliant with home exercise program.  Response to previous treatment: decreased pain  Functional change: improved mobility with crutches    Pain: 4/10  Location: left knee      Objective     Range of Motion: Knee    Left Right   Flexion: 60 130   Extension 0 0     Reji  "received therapeutic exercises to develop strength, endurance, ROM, flexibility, posture and core stabilization for 36 minutes including:  Heel Slide c/ sheet and board x 5 min with PT assistance  Calf Str c/ strap in long-sitting 3x30"  HS Str in long-sitting 3x30"   Heel Prop 5# 4 mins  SLR 2x10 with PT assist (long sit)  SL Hip Abd 2 x 10  Quad Set with Russian and heel prop (see below) x 10 min       Reji received the following manual therapy techniques: Joint mobilizations were applied to the: L knee for 8 minutes, including:  Patella mobs - all directions  IASTM L quad/lateral knee         Reji received the following supervised modalities after being cleared for contradictions: NMES Electrical Stimulation:  Reji received NMES Electrical Stimulation to elicit muscle contraction of the L quad. Pt received stimulation at a rate of 50 pps with asymmetric current, ramp of 2 seconds with 10 second on time and 10 second off time x 10 minutes with quad set/heel prop at 37 mA. Patient tolerated treatment well without any adverse effects.       Reji received cold pack for 10 minutes to L knee.      Home Exercises Provided and Patient Education Provided     Education provided:   Written Home Exercises Provided: yes.  Exercises were reviewed and Reji was able to demonstrate them prior to the end of the session.  Reji demonstrated good  understanding of the education provided.     See EMR under Patient Instructions for exercises provided prior visit.    Assessment   The patient presents 2 weeks s/p and  is able to achieve ROM within protocol limits. Required assistance with SLR flexion today due to extensor lag. Indonesian E stim continued to improve quadriceps neuromuscular control and strength. Some relief of lateral knee pain with IASTM to L lateral quad/lateral hamstring. Educated patient on continued use of ice to control edema.   Reji is progressing well towards his goals.   Pt prognosis is Good.     Pt will " continue to benefit from skilled outpatient physical therapy to address the deficits listed in the problem list box on initial evaluation, provide pt/family education and to maximize pt's level of independence in the home and community environment.   Pt's spiritual, cultural and educational needs considered and pt agreeable to plan of care and goals.     Anticipated barriers to physical therapy: COVID-19 Concerns    Goals:     Short Term GOALS: 6 weeks. Pt agrees with goals set.   1. Patient demonstrates independence with HEP. In progress  2. Patient demonstrates increased strength BLE's by 1/3 muscle grade or greater to improve tolerance to functional activities pain free. In progress  3. Patient demonstrates increased 0 to 120 degrees to improve tolerance to functional activities pain free. In progress  4. Patient will report pain of 4/10 at worst, on 0-10 pain scale, with all activity In progress  5. Patient demonstrates ability to walk 500 feet indep without AD, appropriate gait pattern, no pain provocation In progress    Long Term GOALS: 12 weeks. Pt agrees with goals set.In progress  1. Patient demonstrates increased L knee ROM symmetrical to R knee to improve tolerance to functional activities pain free.   2. Patient demonstrates increased strength BLE's to 4+/5 or greater to improve tolerance to functional activities pain free.   3. Patient demonstrates improved overall function per FOTO Knee Survey to 50% Limitation or less.   4. Patient will report pain of 2/10 at worst, on 0-10 pain scale, with all activity  5. Patient demonstrates ability to walk 1 mile indep without AD , proper gait pattern, no pain provocation  6. Patient demonstrates ability to perform typical work duties, without pain, appropriate movement pattern    Plan     Emphasize full knee extension and quad control, progress flexion ROM as tolerated    EVELINA BADILLO, PT

## 2020-08-11 DIAGNOSIS — S83.282D OTHER TEAR OF LATERAL MENISCUS OF LEFT KNEE AS CURRENT INJURY, SUBSEQUENT ENCOUNTER: ICD-10-CM

## 2020-08-11 DIAGNOSIS — M22.42 CHONDROMALACIA, PATELLA, LEFT: ICD-10-CM

## 2020-08-11 DIAGNOSIS — M94.262 CHONDROMALACIA OF LEFT KNEE: ICD-10-CM

## 2020-08-11 RX ORDER — TRAMADOL HYDROCHLORIDE 50 MG/1
50 TABLET ORAL EVERY 6 HOURS PRN
Qty: 28 EACH | Refills: 0 | Status: SHIPPED | OUTPATIENT
Start: 2020-08-11 | End: 2020-08-19 | Stop reason: SDUPTHER

## 2020-08-11 RX ORDER — OXYCODONE AND ACETAMINOPHEN 10; 325 MG/1; MG/1
1 TABLET ORAL EVERY 6 HOURS PRN
Qty: 28 TABLET | Refills: 0 | Status: SHIPPED | OUTPATIENT
Start: 2020-08-11 | End: 2020-08-19 | Stop reason: SDUPTHER

## 2020-08-12 ENCOUNTER — OFFICE VISIT (OUTPATIENT)
Dept: SPORTS MEDICINE | Facility: CLINIC | Age: 35
End: 2020-08-12
Payer: COMMERCIAL

## 2020-08-12 VITALS
HEIGHT: 72 IN | HEART RATE: 105 BPM | WEIGHT: 227 LBS | BODY MASS INDEX: 30.75 KG/M2 | DIASTOLIC BLOOD PRESSURE: 102 MMHG | SYSTOLIC BLOOD PRESSURE: 162 MMHG

## 2020-08-12 DIAGNOSIS — M94.262 CHONDROMALACIA OF LEFT KNEE: Primary | ICD-10-CM

## 2020-08-12 DIAGNOSIS — Z98.890 S/P LEFT KNEE SURGERY: ICD-10-CM

## 2020-08-12 PROCEDURE — 99024 PR POST-OP FOLLOW-UP VISIT: ICD-10-PCS | Mod: S$GLB,,, | Performed by: PHYSICIAN ASSISTANT

## 2020-08-12 PROCEDURE — 99999 PR PBB SHADOW E&M-EST. PATIENT-LVL III: ICD-10-PCS | Mod: PBBFAC,,, | Performed by: PHYSICIAN ASSISTANT

## 2020-08-12 PROCEDURE — 99024 POSTOP FOLLOW-UP VISIT: CPT | Mod: S$GLB,,, | Performed by: PHYSICIAN ASSISTANT

## 2020-08-12 PROCEDURE — 99999 PR PBB SHADOW E&M-EST. PATIENT-LVL III: CPT | Mod: PBBFAC,,, | Performed by: PHYSICIAN ASSISTANT

## 2020-08-12 RX ORDER — PROMETHAZINE HYDROCHLORIDE 25 MG/1
25 TABLET ORAL EVERY 6 HOURS PRN
Qty: 30 TABLET | Refills: 0 | Status: SHIPPED | OUTPATIENT
Start: 2020-08-12 | End: 2020-09-11

## 2020-08-12 NOTE — PROGRESS NOTES
Subjective:          Chief Complaint: Reji Pond Jr. is a 34 y.o. male who had concerns including Post-op Evaluation of the Left Knee.    HPI   Patient presents to clinic for 2 week post op evaluation of left knee. Pain today is 4/10. Occasional nausea. Denies vomiting, fever, chill, CP, and SOB. Ambulating with crutches non weight bearing. He is attending formal PT at [facility] with [place] 2x a week. He is currently taking percocet 10mg every 6 hours and Tramadol 50mg for breakthrough pain. Doing well.He needs a refill on phenergan.     He was seen in the ER for uvula and upper lip swelling the day after surgery.     DATE OF PROCEDURE: 7/28/2020     ATTENDING SURGEON: Surgeon(s) and Role:     * Santo Shepherd MD - Primary     * Shelley Machuca MD - Fellow     ASSISTANTS:  Shelley Machuca MD - Fellow  SMA Ino - Assistant     PREOPERATIVE DIAGNOSIS:  Left  Chondromalacia, (excludes patella) M94.29 and Tear, Lateral meniscus, acute S83.289A     POSTOPERATIVE DIAGNOSIS:   Left  Chondromalacia, (excludes patella) M94.29, Internal derangement knee M23.90 and Tear, Lateral meniscus, acute S83.289A     PROCEDURES(S) PERFORMED:   1. Left  Arthroscopy, Osteochondral Allograft 06104  2.  Left  Arthroscopy, with meniscectomy (medial OR lateral) 83174, Lateral  3.  Left  Arthroscopy, debridement/shaving of articular cartilage (chondroplasty) 23410    Review of Systems   Constitution: Negative. Negative for chills, fever, weight gain and weight loss.   HENT: Negative for congestion and sore throat.    Eyes: Negative for blurred vision and double vision.   Cardiovascular: Negative for chest pain, leg swelling and palpitations.   Respiratory: Negative for cough and shortness of breath.    Hematologic/Lymphatic: Does not bruise/bleed easily.   Skin: Negative for itching, poor wound healing and rash.   Musculoskeletal: Positive for joint pain, joint swelling and stiffness. Negative for back  pain, muscle weakness and myalgias.   Gastrointestinal: Negative for abdominal pain, constipation, diarrhea, nausea and vomiting.   Genitourinary: Negative.  Negative for frequency and hematuria.   Neurological: Negative for dizziness, headaches, numbness, paresthesias and sensory change.   Psychiatric/Behavioral: Negative for altered mental status and depression. The patient is not nervous/anxious.    Allergic/Immunologic: Negative for hives.       Pain Related Questions  Over the past 3 days, what was your highest pain level?: 9    Other  Was the patient's HEIGHT measured or patient reported?: Patient Reported  Was the patient's WEIGHT measured or patient reported?: Measured      Objective:        General: Reji is well-developed, well-nourished, appears stated age, in no acute distress, alert and oriented to time, place and person.     General    Vitals reviewed.  Constitutional: He is oriented to person, place, and time. He appears well-developed and well-nourished. No distress.   Neck: Normal range of motion.   Cardiovascular: Normal rate and regular rhythm.    Pulmonary/Chest: Effort normal. No respiratory distress.   Neurological: He is alert and oriented to person, place, and time.   Psychiatric: He has a normal mood and affect. His behavior is normal. Thought content normal.             Left Knee Exam     Inspection   Erythema: absent  Scars: present  Swelling: present  Effusion: present  Deformity: absent  Bruising: absent    Tenderness   Left knee tenderness location: global tenderness.    Range of Motion   Extension:  0 normal   Flexion:  50 normal     Other   Sensation: normal    Comments:  Aquacel bandage in place over incision. No signs of infection or necrosis. No purulent drainage. NVI  Suture tags trimmed. Portal suture removed.             Assessment:       Encounter Diagnoses   Name Primary?    Chondromalacia of left knee Yes    S/P left knee surgery           Plan:       1. Provided patient  with operative note.  2. Removed portal suture. Suture tags trimmed.  3. May shower now without covering incisions.  4. Continue  mg once a day.  5. Continue PT per protocol.  6. RTC in 4 weeks with Dr. Santo Shepherd for 6 week post op appt.  7.  NEW TRIPP HOSE APPLIED  8. Refilled phenergan. Continue to wean off pain medication  9.PHYSICAL THERAPY:  The patient should begin physical therapy on postoperative   day # 7 and will be advanced to outpatient therapy as soon as   Possible following discharge.  Weight bearing:non weight bearing  left leg  Range of Motion:no motion for 1 weeks (5 Days)     Continuous passive motion: (CPM) machine will be used postoperatively:     If CPM is required if no cpm machine is used please disregard the instructions:   Start CPM on postop day # 5-6 at  10 degrees hyperextension to 60 degrees flexion as tolerated for 6-8 hours per day for 4 weeks. Advancing to 120 degrees 5 degrees per day as tolerated.                   Patient questionnaires may have been collected.

## 2020-08-13 ENCOUNTER — CLINICAL SUPPORT (OUTPATIENT)
Dept: REHABILITATION | Facility: HOSPITAL | Age: 35
End: 2020-08-13
Attending: ORTHOPAEDIC SURGERY
Payer: COMMERCIAL

## 2020-08-13 DIAGNOSIS — M25.562 ACUTE PAIN OF LEFT KNEE: Primary | ICD-10-CM

## 2020-08-13 DIAGNOSIS — R29.898 DECREASED STRENGTH OF LOWER EXTREMITY: ICD-10-CM

## 2020-08-13 DIAGNOSIS — R26.2 DIFFICULTY WALKING: ICD-10-CM

## 2020-08-13 DIAGNOSIS — M25.662 DECREASED RANGE OF MOTION (ROM) OF LEFT KNEE: ICD-10-CM

## 2020-08-13 DIAGNOSIS — M23.92 ACUTE INTERNAL DERANGEMENT OF LEFT KNEE: ICD-10-CM

## 2020-08-13 PROCEDURE — 97110 THERAPEUTIC EXERCISES: CPT | Mod: PN

## 2020-08-13 NOTE — PROGRESS NOTES
Physical Therapy Daily Treatment Note     Name: Reji Pond Jr.  Clinic Number: [id number]    Therapy Diagnosis:   Encounter Diagnoses   Name Primary?    Decreased range of motion (ROM) of left knee     Decreased strength of lower extremity     Difficulty walking     Acute pain of left knee Yes    Acute internal derangement of left knee      Physician: Santo Shepherd MD    Visit Date: 8/13/2020    Physician Orders: PT Eval and Treat   Medical Diagnosis from Referral: Acute internal derangement of left knee  Evaluation Date: 8/3/2020  Authorization Period Expiration: 12/31/20  Plan of Care Expiration: 10/29/20  Visit # / Visits authorized: 5 / 20    Time In: 1245  Time Out: 1335   Total Billable Time: 40 minutes    Precautions:   DOS: 7/28/20 - Dr. Shepherd POW # 1  1. Left Arthroscopy, Osteochondral Allograft 00121  2.  Left Arthroscopy, with meniscectomy (medial OR lateral) 35023, Lateral  3.  Left Arthroscopy, debridement/shaving of articular cartilage (chondroplasty) 10018    PHYSICAL THERAPY:  Weight bearing:non weight bearing  left leg  Range of Motion:no motion for 1 weeks (5 Days)  Start CPM on postop day # 5-6 at  10 degrees hyperextension to 60 degrees flexion as tolerated for 6-8 hours per day for 4 weeks. Advancing to 120 degrees 5 degrees per day as tolerated.   If the CPM is required the patient is to be taken out of the CPM machine as much as possible.\      Immobilizer if present should be locked @ -10 degrees with gait and allowed to flex to 60 degrees at rest.        Subjective     Pt reports: pain is much improved today. Saw ANDREA yesterday and had bandage removed. She told him that we will start working more ROM in two weeks.   He was compliant with home exercise program.  Response to previous treatment: decreased pain  Functional change: improved mobility with crutches    Pain: 2/10  Location: left knee      Objective   3 cm heel height difference in prone (8/13/20)     Range of Motion:  "Knee    Left Right   Flexion: 60 130   Extension 0 0     Reji received therapeutic exercises (bold exercises performed)  to develop strength, endurance, ROM, flexibility, posture and core stabilization for 36 minutes including:  Seated AAROM knee flexion 2 x 10, 10 sec hold  Heel Slide c/ sheet and board 2 x 10, 10 sec hold  Prone hang x 2 min  Quad set with heel prop and self gastroc stretch using strap 2 x 20, 5 sec holds, 5# weight across knee  T band ankle DF/PF 3 x 10 Black T band   HS Str in long-sitting 3x30"   SLR 4 x 8 (long sit) 1 minute rest between sets   SL Hip Abd 3 x 10 L side performed only  Prone hip ext 2 x 10 (pillow under stomach)  Quad Set with Russian and heel prop (see below) x 10 min       Reji received the following manual therapy techniques: Joint mobilizations were applied to the: L knee for 2 minutes, including:  Patella mobs - all directions          Reji received the following supervised modalities after being cleared for contradictions: NMES Electrical Stimulation: held today      Reji received cold pack for 10 minutes to L knee with heel prop.      Home Exercises Provided and Patient Education Provided     Education provided:   Written Home Exercises Provided: yes.  Exercises were reviewed and Reji was able to demonstrate them prior to the end of the session.  Reji demonstrated good  understanding of the education provided.     See EMR under Patient Instructions for exercises provided prior visit.    Assessment   The patient was able to perform SLR flexion today without extensor lag. Demonstrates approximately 3 cm heel height difference in prone so knee extension stretching was continued. Added T band ankle DF/PF for strengthening as patient is still NWB. Tolerated session with less pain complaints today. Will continue to benefit from NWB L LE strengthening and ROM per protocol to allow for return to normal gait pattern once cleared for WB.   Reji is progressing well towards " his goals.   Pt prognosis is Good.     Pt will continue to benefit from skilled outpatient physical therapy to address the deficits listed in the problem list box on initial evaluation, provide pt/family education and to maximize pt's level of independence in the home and community environment.   Pt's spiritual, cultural and educational needs considered and pt agreeable to plan of care and goals.     Anticipated barriers to physical therapy: COVID-19 Concerns    Goals:     Short Term GOALS: 6 weeks. Pt agrees with goals set.   1. Patient demonstrates independence with HEP. In progress  2. Patient demonstrates increased strength BLE's by 1/3 muscle grade or greater to improve tolerance to functional activities pain free. In progress  3. Patient demonstrates increased 0 to 120 degrees to improve tolerance to functional activities pain free. In progress  4. Patient will report pain of 4/10 at worst, on 0-10 pain scale, with all activity In progress  5. Patient demonstrates ability to walk 500 feet indep without AD, appropriate gait pattern, no pain provocation In progress    Long Term GOALS: 12 weeks. Pt agrees with goals set.In progress  1. Patient demonstrates increased L knee ROM symmetrical to R knee to improve tolerance to functional activities pain free.   2. Patient demonstrates increased strength BLE's to 4+/5 or greater to improve tolerance to functional activities pain free.   3. Patient demonstrates improved overall function per FOTO Knee Survey to 50% Limitation or less.   4. Patient will report pain of 2/10 at worst, on 0-10 pain scale, with all activity  5. Patient demonstrates ability to walk 1 mile indep without AD , proper gait pattern, no pain provocation  6. Patient demonstrates ability to perform typical work duties, without pain, appropriate movement pattern    Plan     Emphasize full knee extension and quad control, progress flexion ROM as tolerated    EVELINA BADILLO, PT

## 2020-08-17 ENCOUNTER — CLINICAL SUPPORT (OUTPATIENT)
Dept: REHABILITATION | Facility: HOSPITAL | Age: 35
End: 2020-08-17
Attending: ORTHOPAEDIC SURGERY
Payer: COMMERCIAL

## 2020-08-17 DIAGNOSIS — R26.2 DIFFICULTY WALKING: ICD-10-CM

## 2020-08-17 DIAGNOSIS — M25.562 ACUTE PAIN OF LEFT KNEE: Primary | ICD-10-CM

## 2020-08-17 DIAGNOSIS — M23.92 ACUTE INTERNAL DERANGEMENT OF LEFT KNEE: ICD-10-CM

## 2020-08-17 DIAGNOSIS — R29.898 DECREASED STRENGTH OF LOWER EXTREMITY: ICD-10-CM

## 2020-08-17 DIAGNOSIS — M25.662 DECREASED RANGE OF MOTION (ROM) OF LEFT KNEE: ICD-10-CM

## 2020-08-17 PROCEDURE — 97110 THERAPEUTIC EXERCISES: CPT | Mod: PN

## 2020-08-17 NOTE — PROGRESS NOTES
Physical Therapy Daily Treatment Note     Name: Reji Pond Jr.  Clinic Number: [id number]    Therapy Diagnosis:   No diagnosis found.  Physician: Santo Shepherd MD    Visit Date: 8/17/2020    Physician Orders: PT Eval and Treat   Medical Diagnosis from Referral: Acute internal derangement of left knee  Evaluation Date: 8/3/2020  Authorization Period Expiration: 12/31/20  Plan of Care Expiration: 10/29/20  Visit # / Visits authorized: 6 / 20    Time In: 1223 (13 minutes late)   Time Out: 1308   Total Billable Time: 34 minutes    Precautions:   DOS: 7/28/20 - Dr. Shepherd POW # 1  1. Left Arthroscopy, Osteochondral Allograft 96993  2.  Left Arthroscopy, with meniscectomy (medial OR lateral) 10440, Lateral  3.  Left Arthroscopy, debridement/shaving of articular cartilage (chondroplasty) 33057    PHYSICAL THERAPY:  Weight bearing:non weight bearing  left leg  Range of Motion:no motion for 1 weeks (5 Days)  Start CPM on postop day # 5-6 at  10 degrees hyperextension to 60 degrees flexion as tolerated for 6-8 hours per day for 4 weeks. Advancing to 120 degrees 5 degrees per day as tolerated.   If the CPM is required the patient is to be taken out of the CPM machine as much as possible.\      Immobilizer if present should be locked @ -10 degrees with gait and allowed to flex to 60 degrees at rest.        Subjective     Pt reports: swelling and pain are better today. States his job wants information from the PT and MD on when he can return to work and his progress with rehab. Late arrival but contacted clinic to notify PT of this.   He was compliant with home exercise program.  Response to previous treatment: decreased pain  Functional change: improved mobility with crutches    Pain: 0/10  Location: left knee      Objective   0 cm heel height difference in prone (8/17/20)     Range of Motion: Knee    Left Right   Flexion: 90 AAROM 130   Extension 0 0     Reji received therapeutic exercises (bold exercises performed)   to develop strength, endurance, ROM, flexibility, posture and core stabilization for 34 minutes including:    Heel Slide c/ sheet and board 3 min, 10 sec hold  SLR flexion 3 x 10  Quad set with heel prop and self gastroc stretch using strap 3 x 10, 5 sec holds, 10# weight across knee  SAQ 3 x 15, 3 sec hold 5#  Prone TKE 30 x 5 sec  SL Hip Abd 3 x 10 2# ea  Prone hip ext 2 x 10 (pillow under stomach)  T band ankle DF/PF 3 x 10 Black T band   Prone hang x 2 min  Quad Set with Russian and heel prop (see below) x 10 min       Reji received the following manual therapy techniques: Joint mobilizations were applied to the: L knee for 0 minutes, including:  Patella mobs - all directions          Reji received the following supervised modalities after being cleared for contradictions: NMES Electrical Stimulation: held today      Reji received cold pack for 10 minutes to L knee with heel prop.      Home Exercises Provided and Patient Education Provided     Education provided:   Written Home Exercises Provided: yes.  Exercises were reviewed and Reji was able to demonstrate them prior to the end of the session.  Reji demonstrated good  understanding of the education provided.     See EMR under Patient Instructions for exercises provided prior visit.    Assessment   The patient is currently 3 weeks post op and presents with reduced edema in knee today. Mild extensor lag with fatigue during SLR flexion but demonstrates equal heel height difference in prone. [place] citizen Estim not performed today due to issues with machine. The patient will benefit from continued PT to address LE weakness, especially while he is NWB.   Reji is progressing well towards his goals.   Pt prognosis is Good.     Pt will continue to benefit from skilled outpatient physical therapy to address the deficits listed in the problem list box on initial evaluation, provide pt/family education and to maximize pt's level of independence in the home and community  environment.   Pt's spiritual, cultural and educational needs considered and pt agreeable to plan of care and goals.     Anticipated barriers to physical therapy: COVID-19 Concerns    Goals:     Short Term GOALS: 6 weeks. Pt agrees with goals set.   1. Patient demonstrates independence with HEP. In progress  2. Patient demonstrates increased strength BLE's by 1/3 muscle grade or greater to improve tolerance to functional activities pain free. In progress  3. Patient demonstrates increased 0 to 120 degrees to improve tolerance to functional activities pain free. In progress  4. Patient will report pain of 4/10 at worst, on 0-10 pain scale, with all activity In progress  5. Patient demonstrates ability to walk 500 feet indep without AD, appropriate gait pattern, no pain provocation In progress    Long Term GOALS: 12 weeks. Pt agrees with goals set.In progress  1. Patient demonstrates increased L knee ROM symmetrical to R knee to improve tolerance to functional activities pain free.   2. Patient demonstrates increased strength BLE's to 4+/5 or greater to improve tolerance to functional activities pain free.   3. Patient demonstrates improved overall function per FOTO Knee Survey to 50% Limitation or less.   4. Patient will report pain of 2/10 at worst, on 0-10 pain scale, with all activity  5. Patient demonstrates ability to walk 1 mile indep without AD , proper gait pattern, no pain provocation  6. Patient demonstrates ability to perform typical work duties, without pain, appropriate movement pattern    Plan     Emphasize full knee extension and quad control, progress flexion ROM as tolerated    EVELINA BADILLO, PT

## 2020-08-19 ENCOUNTER — CLINICAL SUPPORT (OUTPATIENT)
Dept: REHABILITATION | Facility: HOSPITAL | Age: 35
End: 2020-08-19
Attending: ORTHOPAEDIC SURGERY
Payer: COMMERCIAL

## 2020-08-19 ENCOUNTER — TELEPHONE (OUTPATIENT)
Dept: SPORTS MEDICINE | Facility: CLINIC | Age: 35
End: 2020-08-19

## 2020-08-19 DIAGNOSIS — M22.42 CHONDROMALACIA, PATELLA, LEFT: ICD-10-CM

## 2020-08-19 DIAGNOSIS — M94.262 CHONDROMALACIA OF LEFT KNEE: ICD-10-CM

## 2020-08-19 DIAGNOSIS — M25.562 ACUTE PAIN OF LEFT KNEE: Primary | ICD-10-CM

## 2020-08-19 DIAGNOSIS — S83.282D OTHER TEAR OF LATERAL MENISCUS OF LEFT KNEE AS CURRENT INJURY, SUBSEQUENT ENCOUNTER: ICD-10-CM

## 2020-08-19 DIAGNOSIS — M25.662 DECREASED RANGE OF MOTION (ROM) OF LEFT KNEE: ICD-10-CM

## 2020-08-19 DIAGNOSIS — R26.2 DIFFICULTY WALKING: ICD-10-CM

## 2020-08-19 DIAGNOSIS — R29.898 DECREASED STRENGTH OF LOWER EXTREMITY: ICD-10-CM

## 2020-08-19 DIAGNOSIS — M23.92 ACUTE INTERNAL DERANGEMENT OF LEFT KNEE: ICD-10-CM

## 2020-08-19 PROCEDURE — 97110 THERAPEUTIC EXERCISES: CPT | Mod: PN

## 2020-08-19 RX ORDER — OXYCODONE AND ACETAMINOPHEN 10; 325 MG/1; MG/1
1 TABLET ORAL EVERY 6 HOURS PRN
Qty: 28 TABLET | Refills: 0 | Status: SHIPPED | OUTPATIENT
Start: 2020-08-19 | End: 2020-09-05 | Stop reason: SDUPTHER

## 2020-08-19 NOTE — TELEPHONE ENCOUNTER
Pt informs me he is taking the Percocet 10 mg every and 6 hours and Tradamol also at every 6 hours as breakthrough. Informed pt he should beginning the Percocet every 8 hours along with the Tramadol for breakthrough. Pt expressed understanding.

## 2020-08-19 NOTE — PROGRESS NOTES
"  Physical Therapy Daily Treatment Note     Name: Reji Pond Jr.  Clinic Number: [id number]    Therapy Diagnosis:   Encounter Diagnoses   Name Primary?    Decreased range of motion (ROM) of left knee     Decreased strength of lower extremity     Difficulty walking     Acute pain of left knee Yes    Acute internal derangement of left knee      Physician: Santo Shepherd MD    Visit Date: 8/19/2020    Physician Orders: PT Eval and Treat   Medical Diagnosis from Referral: Acute internal derangement of left knee  Evaluation Date: 8/3/2020  Authorization Period Expiration: 12/31/20  Plan of Care Expiration: 10/29/20  Visit # / Visits authorized: 6 / 20    Time In: 2:05pm  (35 minutes late)   Time Out: 2:45pm  Total Billable Time: 40 minutes    Precautions:   DOS: 7/28/20 - Dr. Shepherd POW # 3  1. Left Arthroscopy, Osteochondral Allograft 51660  2.  Left Arthroscopy, with meniscectomy (medial OR lateral) 35136, Lateral  3.  Left Arthroscopy, debridement/shaving of articular cartilage (chondroplasty) 91960    PHYSICAL THERAPY:  Weight bearing:non weight bearing  left leg  Range of Motion:no motion for 1 weeks (5 Days)  Start CPM on postop day # 5-6 at  10 degrees hyperextension to 60 degrees flexion as tolerated for 6-8 hours per day for 4 weeks. Advancing to 120 degrees 5 degrees per day as tolerated.   If the CPM is required the patient is to be taken out of the CPM machine as much as possible.\      Immobilizer if present should be locked @ -10 degrees with gait and allowed to flex to 60 degrees at rest.        Subjective     Pt reports: Late arrival but contacted clinic to notify PT of this. Doesn't present to clinic with brace donned. States he is preparing to "ramp it up" starting next week  He was compliant with home exercise program.  Response to previous treatment: decreased pain  Functional change: improved mobility with crutches    Pain: 0/10  Location: left knee      Objective   0 cm heel height " difference in prone (8/17/20)     Range of Motion: Knee    Left Right   Flexion: 105 AAROM 130   Extension 0 0     Reji received therapeutic exercises (bold exercises performed)  to develop strength, endurance, ROM, flexibility, posture and core stabilization for 34 minutes including:    Seated L Knee Flex with R LE Assist 5 mins  Heel Slide c/ sheet and board 3 min, 10 sec hold    Quad set with heel prop and self gastroc stretch using strap 3 x 10, 5 sec holds, 10# weight across knee  Quad Set NMES 5 mins  SLR NMES 5 mmins  SLR indep 3x10    SAQ 3 x 15, 3 sec hold 5# -perform next session  Prone TKE 30 x 5 sec  SL Hip Abd 3 x 10 2# ea - perform next session  Prone hip ext 2 x 10 (pillow under stomach)  T band ankle DF/PF 3 x 10 Black T band   Prone hang x 2 min  Quad Set with Russian and heel prop (see below) x 10 min       Reji received the following manual therapy techniques: Joint mobilizations were applied to the: L knee for 0 minutes, including:  Patella mobs - all directions          Reji received the following supervised modalities after being cleared for contradictions: NMES Electrical Stimulation: held today      Reji received cold pack for 10 minutes to L knee with heel prop.      Home Exercises Provided and Patient Education Provided     Education provided:   Written Home Exercises Provided: yes.  Exercises were reviewed and Reji was able to demonstrate them prior to the end of the session.  Reji demonstrated good  understanding of the education provided.     See EMR under Patient Instructions for exercises provided prior visit.    Assessment     Pt presented to clinic NWB on L LE with no brace donned. OTC bandages applied to L knee surgical incision, wet and poor adhesion noted; removed them during session. Pt able to improved L knee flexion AAROM with mild pain during gentle over-pressure. Improved quad control with SLR performed without extensor lag.Reji is progressing well towards his goals.    Pt prognosis is Good.     Pt will continue to benefit from skilled outpatient physical therapy to address the deficits listed in the problem list box on initial evaluation, provide pt/family education and to maximize pt's level of independence in the home and community environment.   Pt's spiritual, cultural and educational needs considered and pt agreeable to plan of care and goals.     Anticipated barriers to physical therapy: COVID-19 Concerns    Goals:     Short Term GOALS: 6 weeks. Pt agrees with goals set.   1. Patient demonstrates independence with HEP. In progress  2. Patient demonstrates increased strength BLE's by 1/3 muscle grade or greater to improve tolerance to functional activities pain free. In progress  3. Patient demonstrates increased 0 to 120 degrees to improve tolerance to functional activities pain free. In progress  4. Patient will report pain of 4/10 at worst, on 0-10 pain scale, with all activity In progress  5. Patient demonstrates ability to walk 500 feet indep without AD, appropriate gait pattern, no pain provocation In progress    Long Term GOALS: 12 weeks. Pt agrees with goals set.In progress  1. Patient demonstrates increased L knee ROM symmetrical to R knee to improve tolerance to functional activities pain free.   2. Patient demonstrates increased strength BLE's to 4+/5 or greater to improve tolerance to functional activities pain free.   3. Patient demonstrates improved overall function per FOTO Knee Survey to 50% Limitation or less.   4. Patient will report pain of 2/10 at worst, on 0-10 pain scale, with all activity  5. Patient demonstrates ability to walk 1 mile indep without AD , proper gait pattern, no pain provocation  6. Patient demonstrates ability to perform typical work duties, without pain, appropriate movement pattern    Plan     Emphasize full knee extension and quad control, progress flexion ROM as tolerated    Danish Carter, PT

## 2020-08-19 NOTE — TELEPHONE ENCOUNTER
----- Message from Donnie Willis sent at 8/19/2020  1:55 PM CDT -----  Contact: pt  Please call pt at [phone]    Refill request for oxyCODONE-acetaminophen (PERCOCET)  mg per tablet &  traMADoL (ULTRAM) 50 mg tablet     Use [facility] #[id number] [place] [address] AT [facility] [address] [facility] [address] [phone] (Phone)  [phone] (Fax)    Patient is out of medications    Thank you

## 2020-08-19 NOTE — TELEPHONE ENCOUNTER
LVM requesting call back regarding how often patient has been taking medication, and to reinforce weaning protocol.

## 2020-08-20 RX ORDER — TRAMADOL HYDROCHLORIDE 50 MG/1
50 TABLET ORAL EVERY 6 HOURS PRN
Qty: 28 EACH | Refills: 0 | Status: SHIPPED | OUTPATIENT
Start: 2020-08-20 | End: 2020-09-05 | Stop reason: SDUPTHER

## 2020-08-20 NOTE — TELEPHONE ENCOUNTER
----- Message from Frederic Chaudhry MA sent at 8/19/2020  3:02 PM CDT -----  Contact: pt  Pt taking both meds every 6 hours. Informed him to been weaning off, and to take every 8 hours. Tramadol for breakthrough.   ----- Message -----  From: Frederic Chaudhry MA  Sent: 8/19/2020   2:24 PM CDT  To:         ----- Message -----  From: Donnie Willis  Sent: 8/19/2020   1:55 PM CDT  To: Kirstin Isaacs Staff    Please call pt at [phone]    Refill request for oxyCODONE-acetaminophen (PERCOCET)  mg per tablet &  traMADoL (ULTRAM) 50 mg tablet     Use [id number] [address] [facility] [address] AT [facility] [address] [phone] (Phone)  [phone] (Fax)    Patient is out of medications    Thank you

## 2020-08-26 ENCOUNTER — DOCUMENTATION ONLY (OUTPATIENT)
Dept: REHABILITATION | Facility: HOSPITAL | Age: 35
End: 2020-08-26
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
## 2020-08-31 ENCOUNTER — CLINICAL SUPPORT (OUTPATIENT)
Dept: REHABILITATION | Facility: HOSPITAL | Age: 35
End: 2020-08-31
Attending: ORTHOPAEDIC SURGERY
Payer: COMMERCIAL

## 2020-08-31 DIAGNOSIS — M25.662 DECREASED RANGE OF MOTION (ROM) OF LEFT KNEE: ICD-10-CM

## 2020-08-31 DIAGNOSIS — M25.562 ACUTE PAIN OF LEFT KNEE: Primary | ICD-10-CM

## 2020-08-31 DIAGNOSIS — R29.898 DECREASED STRENGTH OF LOWER EXTREMITY: ICD-10-CM

## 2020-08-31 DIAGNOSIS — M23.92 ACUTE INTERNAL DERANGEMENT OF LEFT KNEE: ICD-10-CM

## 2020-08-31 DIAGNOSIS — R26.2 DIFFICULTY WALKING: ICD-10-CM

## 2020-08-31 PROCEDURE — 97110 THERAPEUTIC EXERCISES: CPT | Mod: PN
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
## 2020-09-02 ENCOUNTER — TELEPHONE (OUTPATIENT)
Dept: SPORTS MEDICINE | Facility: CLINIC | Age: 35
End: 2020-09-02

## 2020-09-02 ENCOUNTER — DOCUMENTATION ONLY (OUTPATIENT)
Dept: REHABILITATION | Facility: HOSPITAL | Age: 35
End: 2020-09-02

## 2020-09-02 NOTE — PROGRESS NOTES
Pt didn't present for scheduled PT follow-up appointment on 09/02/2020. Next appointment is scheduled on 9/8/20     Danish Carter, PT

## 2020-09-02 NOTE — TELEPHONE ENCOUNTER
Received STD forms from  [employer]. Forms completed and faxed to them at [phone].    [facility]

## 2020-09-08 ENCOUNTER — CLINICAL SUPPORT (OUTPATIENT)
Dept: REHABILITATION | Facility: HOSPITAL | Age: 35
End: 2020-09-08
Attending: ORTHOPAEDIC SURGERY
Payer: COMMERCIAL

## 2020-09-08 DIAGNOSIS — R26.2 DIFFICULTY WALKING: ICD-10-CM

## 2020-09-08 DIAGNOSIS — M23.92 ACUTE INTERNAL DERANGEMENT OF LEFT KNEE: ICD-10-CM

## 2020-09-08 DIAGNOSIS — R29.898 DECREASED STRENGTH OF LOWER EXTREMITY: ICD-10-CM

## 2020-09-08 DIAGNOSIS — M25.662 DECREASED RANGE OF MOTION (ROM) OF LEFT KNEE: ICD-10-CM

## 2020-09-08 DIAGNOSIS — M25.562 ACUTE PAIN OF LEFT KNEE: Primary | ICD-10-CM

## 2020-09-08 PROCEDURE — 97110 THERAPEUTIC EXERCISES: CPT | Mod: PN

## 2020-09-08 NOTE — PROGRESS NOTES
Physical Therapy Daily Treatment Note     Name: Reji Pond Jr.  Clinic Number: [id number]    Therapy Diagnosis:   Encounter Diagnoses   Name Primary?    Decreased range of motion (ROM) of left knee     Decreased strength of lower extremity     Difficulty walking     Acute pain of left knee Yes    Acute internal derangement of left knee      Physician: Santo Shepherd MD    Visit Date: 9/8/2020    Physician Orders: PT Eval and Treat   Medical Diagnosis from Referral: Acute internal derangement of left knee  Evaluation Date: 8/3/2020  Authorization Period Expiration: 12/31/20  Plan of Care Expiration: 10/29/20  Visit # / Visits authorized: 9 / 20  Next PN 9/28/20    Time In: 12:47 pm  Time Out: 1:30 pm  Total Billable Time: 40 minutes    Precautions:   DOS: 7/28/20 - Dr. Shepherd   1. Left Arthroscopy, Osteochondral Allograft 49316  2.  Left Arthroscopy, with meniscectomy (medial OR lateral) 36702, Lateral  3.  Left Arthroscopy, debridement/shaving of articular cartilage (chondroplasty) 76240         Subjective     Pt reports: no current knee pain and doing well. Sees MD tomorrow.   He was compliant with home exercise program.  Response to previous treatment: decreased pain  Functional change: improved mobility with crutches    Pain: 0/10  Location: left knee      Objective   # of No shows: 3  # of cancels:  0    Range of Motion: Knee    Left Right   Flexion: 120 130   Extension 0 0      Strength: Knee    Left Right   Quadriceps 4-/5 5/5   Hamstrings 4-/5 5/5         Strength: Hip    Left Right   Iliopsoas 4/5 5/5   Glute Med 4/5 5/5   Hip Ext 4/5 5/5   Ankle PF 4/5 5/5   Ankle DF 4/5 5/5        Range of Motion: Knee    Left Right   Flexion: 120 AAROM 130   Extension 0 0     Rjei received therapeutic exercises (bold exercises performed)  to develop strength, endurance, ROM, flexibility, posture and core stabilization for 40 minutes including:  Upright bike x 6 min level 5   Heel slides with sheet/board 20 x  10 sec  SLR flexion 3 x 10 2# L   Bridges 3 x 10  Sidelying hip abduction 3 x 10 3#  Prone quad stretch on L  3 x 30 sec  Ankle PF with T band 3 x 10 Black t band  LAQ  3 x 10 1 # L   SAQ 3 x 12 3# L   Matrix knee flexion (next visit)  Matrix hip abduction (next visit)         Reji received the following manual therapy techniques: Joint mobilizations were applied to the: L knee for 1 minutes, including:  Patella mobs - all directions          Reji received the following supervised modalities after being cleared for contradictions: NMES Electrical Stimulation: held today      Reji received cold pack for 00 minutes to L knee with heel prop.      Home Exercises Provided and Patient Education Provided     Education provided:   Written Home Exercises Provided: yes.  Exercises were reviewed and Reji was able to demonstrate them prior to the end of the session.  Reji demonstrated good  understanding of the education provided.     See EMR under Patient Instructions for exercises provided prior visit.    Assessment   The patient is currently 6 weeks post op and able to demonstrate 120 degrees L knee flexion AROM. Reports some knee pain (patellar tendon) with SLR flexion, SAQ, and LAQ due to quadriceps weakness and fatigue. He is able to demonstrate SLR flexion without weight and no extensor lag but quadriceps muscle shaking and fatigue reported with 2# weight. Advanced to SAQ (3#) and LAQ (1#) . Continues to require B axillary crutches for ambulation due to post surgical precautions and L knee weakness. He continues to remain a candidate for skilled PT to address goals not met and return to PLOF.   .Reji is progressing well towards his goals.   Pt prognosis is Good.     Pt will continue to benefit from skilled outpatient physical therapy to address the deficits listed in the problem list box on initial evaluation, provide pt/family education and to maximize pt's level of independence in the home and community  environment.   Pt's spiritual, cultural and educational needs considered and pt agreeable to plan of care and goals.     Anticipated barriers to physical therapy: COVID-19 Concerns    Goals:     Short Term GOALS: 6 weeks. Pt agrees with goals set. Updated 8/31/20  1. Patient demonstrates independence with HEP. met  2. Patient demonstrates increased strength BLE's by 1/3 muscle grade or greater to improve tolerance to functional activities pain free. progressing  3. Patient demonstrates increased 0 to 120 degrees to improve tolerance to functional activities pain free. met  4. Patient will report pain of 4/10 at worst, on 0-10 pain scale, with all activity In progress  5. Patient demonstrates ability to walk 500 feet indep without AD, appropriate gait pattern, no pain provocation In progress    Long Term GOALS: 12 weeks. Pt agrees with goals set.Updated 8/31/20  1. Patient demonstrates increased L knee ROM symmetrical to R knee to improve tolerance to functional activities pain free. progressing  2. Patient demonstrates increased strength BLE's to 4+/5 or greater to improve tolerance to functional activities pain free.  progressing  3. Patient demonstrates improved overall function per FOTO Knee Survey to 50% Limitation or less. NT today due to time constraints  4. Patient will report pain of 2/10 at worst, on 0-10 pain scale, with all activityprogressing  5. Patient demonstrates ability to walk 1 mile indep without AD , proper gait pattern, no pain provocation NT today due to crutches still in use  6. Patient demonstrates ability to perform typical work duties, without pain, appropriate movement pattern NT today due to crutches still in use    Plan     Continue with skilled PT. Advance WB exercises pending MD approval next visit.     EVEILNA BADILLO, PT

## 2020-09-08 NOTE — PROGRESS NOTES
Subjective:          Chief Complaint: Reji Pond Jr. is a 34 y.o. male who had concerns including Pain of the Left Knee.    Patient presents to clinic for 6 week post op evaluation of left knee. Pain today is 4/10. Occasional nausea. Denies vomiting, fever, chill, CP, and SOB. Ambulating with a single crutch. He is attending formal PT at [facility] with Danish 2x a week. He is currently taking percocet 10mg every 6 hours and Tramadol 50mg for breakthrough pain. Doing well. He needs a refill on Tramadol.    He was seen in the ER for uvula and upper lip swelling the day after surgery.     DATE OF PROCEDURE: 7/28/2020     ATTENDING SURGEON: Surgeon(s) and Role:     * Santo Shepherd MD - Primary     * Shelley Machuca MD - Fellow     ASSISTANTS:  Shelley Machuca MD - Fellow  SMA Ino - Assistant     PREOPERATIVE DIAGNOSIS:  Left  Chondromalacia, (excludes patella) M94.29 and Tear, Lateral meniscus, acute S83.289A     POSTOPERATIVE DIAGNOSIS:   Left  Chondromalacia, (excludes patella) M94.29, Internal derangement knee M23.90 and Tear, Lateral meniscus, acute S83.289A     PROCEDURES(S) PERFORMED:   1. Left  Arthroscopy, Osteochondral Allograft 17579  2.  Left  Arthroscopy, with meniscectomy (medial OR lateral) 80022, Lateral  3.  Left  Arthroscopy, debridement/shaving of articular cartilage (chondroplasty) 34642             Review of Systems   Constitution: Negative. Negative for chills, fever, weight gain and weight loss.   HENT: Negative for congestion and sore throat.    Eyes: Negative for blurred vision and double vision.   Cardiovascular: Negative for chest pain, leg swelling and palpitations.   Respiratory: Negative for cough and shortness of breath.    Hematologic/Lymphatic: Does not bruise/bleed easily.   Skin: Negative for itching, poor wound healing and rash.   Musculoskeletal: Positive for joint pain, joint swelling and stiffness. Negative for back pain, muscle weakness and  myalgias.   Gastrointestinal: Negative for abdominal pain, constipation, diarrhea, nausea and vomiting.   Genitourinary: Negative.  Negative for frequency and hematuria.   Neurological: Negative for dizziness, headaches, numbness, paresthesias and sensory change.   Psychiatric/Behavioral: Negative for altered mental status and depression. The patient is not nervous/anxious.    Allergic/Immunologic: Negative for hives.       Pain Related Questions  Over the past 3 days, what was your average pain during activity? (I.e. running, jogging, walking, climbing stairs, getting dressed, ect.): 4  Over the past 3 days, what was your highest pain level?: 5  Over the past 3 days, what was your lowest pain level? : 3    Other  How many nights a week are you awakened by your affected body part?: 3  Was the patient's HEIGHT measured or patient reported?: Patient Reported  Was the patient's WEIGHT measured or patient reported?: Measured      Objective:        General: Reji is well-developed, well-nourished, appears stated age, in no acute distress, alert and oriented to time, place and person.     General    Vitals reviewed.  Constitutional: He is oriented to person, place, and time. He appears well-developed and well-nourished. No distress.   HENT:   Mouth/Throat: No oropharyngeal exudate.   Eyes: Right eye exhibits no discharge. Left eye exhibits no discharge.   Neck: Normal range of motion.   Cardiovascular: Normal rate and regular rhythm.    Pulmonary/Chest: Effort normal and breath sounds normal. No respiratory distress.   Neurological: He is alert and oriented to person, place, and time. He has normal reflexes. No cranial nerve deficit. Coordination normal.   Psychiatric: He has a normal mood and affect. His behavior is normal. Judgment and thought content normal.     General Musculoskeletal Exam   Gait: abnormal       Right Knee Exam     Inspection   Erythema: absent  Scars: absent  Swelling: absent  Effusion:  absent  Deformity: absent  Bruising: absent    Tenderness   The patient is experiencing no tenderness.     Range of Motion   Extension: 0   Flexion: 140     Tests   Meniscus   Smitha:  Medial - negative Lateral - negative  Ligament Examination Lachman: normal (-1 to 2mm) PCL-Posterior Drawer: normal (0 to 2mm)     MCL - Valgus: normal (0 to 2mm)  LCL - Varus: normalPivot Shift: normal (Equal)Reverse Pivot Shift: normal (Equal)Dial Test at 30 degrees: normal (< 5 degrees)Dial Test at 90 degrees: normal (< 5 degrees)  Posterior Sag Test: negative  Posterolateral Corner: unstable (>15 degrees difference)  Patella   Patellar apprehension: negative  Passive Patellar Tilt: neutral  Patellar Tracking: normal  Patellar Glide (quadrants): Lateral - 1   Medial - 2  Q-Angle at 90 degrees: normal  Patellar Grind: negative  J-Sign: none    Other   Meniscal Cyst: absent  Popliteal (Baker's) Cyst: absent  Sensation: normal    Left Knee Exam     Inspection   Erythema: absent  Scars: present  Swelling: absent  Effusion: absent  Deformity: absent  Bruising: absent    Tenderness   The patient tender to palpation of the lateral retinaculum (mild).    Range of Motion   Extension:  0 normal   Flexion:  130 abnormal     Tests   Meniscus   Smitha:  Medial - negative Lateral - negative  Stability Lachman: normal (-1 to 2mm) PCL-Posterior Drawer: normal (0 to 2mm)  MCL - Valgus: normal (0 to 2mm)  LCL - Varus: normal (0 to 2mm)Pivot Shift: normal (Equal)Reverse Pivot Shift: normal (Equal)Dial Test at 30 degrees: normal (< 5 degrees)Dial Test at 90 degrees: normal (< 5 degrees)  Posterior Sag Test: negative  Posterolateral Corner: unstable (>15 degrees difference)  Patella   Patellar apprehension: negative  Passive Patellar Tilt: neutral  Patellar Tracking: normal  Patellar Glide (Quadrants): Lateral - 1 Medial - 2  Q-Angle at 90 degrees: normal  Patellar Grind: negative  J-Sign: J sign absent    Other   Meniscal Cyst: absent  Popliteal  (Baker's) Cyst: absent  Sensation: normal    Comments:  Aquacel bandage in place over incision. No signs of infection or necrosis. No purulent drainage. NVI  Suture tags trimmed. Portal suture removed.     Right Hip Exam     Tests   Jase: negative  Left Hip Exam     Tests   Jase: negative          Muscle Strength   Right Lower Extremity   Hip Abduction: 5/5   Quadriceps:  5/5   Hamstrin/5   Left Lower Extremity   Hip Abduction: 5/5   Quadriceps:  4/5   Hamstrin/5     Reflexes     Left Side  Achilles:  2+  Quadriceps:  2+    Right Side   Achilles:  2+  Quadriceps:  2+    Vascular Exam     Right Pulses  Dorsalis Pedis:      2+  Posterior Tibial:      2+        Left Pulses  Dorsalis Pedis:      2+  Posterior Tibial:      2+                  Assessment:       Encounter Diagnoses   Name Primary?    Chondromalacia of left knee     Chondromalacia, patella, left     Other tear of lateral meniscus of left knee as current injury, subsequent encounter           Plan:       1. IKDC, SF-12 and KOOS was filled out today in clinic.     6 weeks with Dr. Santo Shepherd  will fill out IKDC, SF-12 and KOOS B/L knee XR    2. Tramadol 50mg sent to pharmacy today    3. PT orders placed- [facility]  Full AAROM as tolerated  Full WBAT with lateral  brace (provided today)  CORE  exercycle  Limited open chain and progress  To full open chain in 4 weeks  Plan for full release in 3 months    4. Wean off of crutches    5. HEP 71368 - Santo Shephedr MD and SMA Ino, instructed and demonstrated a CORE HEP. The patient then demonstrated understanding of exercises and proper technique. This program was performed for 17 minutes.     6. Work note provided today: No work at this time. May return to light duty at 3 months post op. In about 6 weeks. Expected full duty at 6 months post op at  2021              Patient questionnaires may have been collected.

## 2020-09-09 ENCOUNTER — OFFICE VISIT (OUTPATIENT)
Dept: SPORTS MEDICINE | Facility: CLINIC | Age: 35
End: 2020-09-09
Payer: COMMERCIAL

## 2020-09-09 VITALS
SYSTOLIC BLOOD PRESSURE: 157 MMHG | BODY MASS INDEX: 31.04 KG/M2 | DIASTOLIC BLOOD PRESSURE: 98 MMHG | HEART RATE: 86 BPM | WEIGHT: 229.19 LBS | HEIGHT: 72 IN

## 2020-09-09 DIAGNOSIS — M22.42 CHONDROMALACIA, PATELLA, LEFT: ICD-10-CM

## 2020-09-09 DIAGNOSIS — S83.282D OTHER TEAR OF LATERAL MENISCUS OF LEFT KNEE AS CURRENT INJURY, SUBSEQUENT ENCOUNTER: ICD-10-CM

## 2020-09-09 DIAGNOSIS — M94.262 CHONDROMALACIA OF LEFT KNEE: ICD-10-CM

## 2020-09-09 PROBLEM — S83.282A TEAR OF LATERAL MENISCUS OF LEFT KNEE, CURRENT: Status: ACTIVE | Noted: 2020-09-09

## 2020-09-09 PROCEDURE — 99024 PR POST-OP FOLLOW-UP VISIT: ICD-10-PCS | Mod: S$GLB,,, | Performed by: ORTHOPAEDIC SURGERY

## 2020-09-09 PROCEDURE — 3008F BODY MASS INDEX DOCD: CPT | Mod: CPTII,S$GLB,, | Performed by: ORTHOPAEDIC SURGERY

## 2020-09-09 PROCEDURE — 99024 POSTOP FOLLOW-UP VISIT: CPT | Mod: S$GLB,,, | Performed by: ORTHOPAEDIC SURGERY

## 2020-09-09 PROCEDURE — 97110 THERAPEUTIC EXERCISES: CPT | Mod: GP,S$GLB,, | Performed by: ORTHOPAEDIC SURGERY

## 2020-09-09 PROCEDURE — 3008F PR BODY MASS INDEX (BMI) DOCUMENTED: ICD-10-PCS | Mod: CPTII,S$GLB,, | Performed by: ORTHOPAEDIC SURGERY

## 2020-09-09 PROCEDURE — 97110 PR THERAPEUTIC EXERCISES: ICD-10-PCS | Mod: GP,S$GLB,, | Performed by: ORTHOPAEDIC SURGERY

## 2020-09-09 PROCEDURE — 99999 PR PBB SHADOW E&M-EST. PATIENT-LVL IV: CPT | Mod: PBBFAC,,, | Performed by: ORTHOPAEDIC SURGERY

## 2020-09-09 PROCEDURE — 99999 PR PBB SHADOW E&M-EST. PATIENT-LVL IV: ICD-10-PCS | Mod: PBBFAC,,, | Performed by: ORTHOPAEDIC SURGERY

## 2020-09-09 RX ORDER — TRAMADOL HYDROCHLORIDE 50 MG/1
50 TABLET ORAL EVERY 12 HOURS PRN
Qty: 28 EACH | Refills: 0 | Status: SHIPPED | OUTPATIENT
Start: 2020-09-09 | End: 2020-09-26 | Stop reason: SDUPTHER

## 2020-09-09 NOTE — LETTER
Patient: Reji Pond   YOB: 1985   Clinic Number: [id number]   Today's Date: September 9, 2020        Certificate to Return to Work     Reji Costa was seen by Santo Shepherd MD on 9/9/2020.    Follow up in about 6 weeks (around 10/21/2020) for 6 weeks with Dr. Santo Shepherd  will fill out IKDC, SF-12 and KOOS B/L knee XR. Reji Costa will be seen by Dr. Santo Shepherd.    Reji Costa cannot return to work at this time.    Specific restrictions: No work at this time. May return to light duty at 3 months post op. In about 6 weeks. Expected full duty at 6 months post op at  January 2021    If you have any questions or concerns, please feel free to contact the office at [phone].    Thank you.      Santo Shepherd MD

## 2020-09-10 ENCOUNTER — CLINICAL SUPPORT (OUTPATIENT)
Dept: REHABILITATION | Facility: HOSPITAL | Age: 35
End: 2020-09-10
Attending: ORTHOPAEDIC SURGERY
Payer: COMMERCIAL

## 2020-09-10 DIAGNOSIS — M25.662 DECREASED RANGE OF MOTION (ROM) OF LEFT KNEE: ICD-10-CM

## 2020-09-10 DIAGNOSIS — M23.92 ACUTE INTERNAL DERANGEMENT OF LEFT KNEE: ICD-10-CM

## 2020-09-10 DIAGNOSIS — R26.2 DIFFICULTY WALKING: ICD-10-CM

## 2020-09-10 DIAGNOSIS — M25.562 ACUTE PAIN OF LEFT KNEE: Primary | ICD-10-CM

## 2020-09-10 DIAGNOSIS — R29.898 DECREASED STRENGTH OF LOWER EXTREMITY: ICD-10-CM

## 2020-09-10 PROCEDURE — 97110 THERAPEUTIC EXERCISES: CPT | Mod: PN

## 2020-09-10 NOTE — PROGRESS NOTES
Physical Therapy Daily Treatment Note     Name: Reji Pond Jr.  Clinic Number: [id number]    Therapy Diagnosis:   Encounter Diagnoses   Name Primary?    Decreased range of motion (ROM) of left knee     Decreased strength of lower extremity     Difficulty walking     Acute pain of left knee Yes    Acute internal derangement of left knee      Physician: Santo Shepherd MD    Visit Date: 9/10/2020    Physician Orders: PT Eval and Treat   Medical Diagnosis from Referral: Acute internal derangement of left knee  Evaluation Date: 8/3/2020  Authorization Period Expiration: 12/31/20  Plan of Care Expiration: 10/29/20  Visit # / Visits authorized: 10 / 20  Next PN 9/28/20    Time In: 1:02  Pm (17 minutes late)  Time Out: 1:32 pm  Total Billable Time: 30 minutes      DOS: 7/28/20 - Dr. Shepherd   1. Left Arthroscopy, Osteochondral Allograft 64122  2.  Left Arthroscopy, with meniscectomy (medial OR lateral) 55620, Lateral  3.  Left Arthroscopy, debridement/shaving of articular cartilage (chondroplasty) 84803    Precautions as 0f 9/10/20:   Full AAROM as tolerated  Full WBAT with lateral  brace   Limited open chain and progress  To full open chain in 4 weeks (week of 10/5/20)  Plan for full release in 3 months      Subjective     Pt reports: he was cleared for WBAT in lateral  brace. No current knee pain. Late arrival due to flat tire.   He was compliant with home exercise program.  Response to previous treatment: no adverse reactions  Functional change:ambulating WBAT with brace     Pain: 0/10  Location: left knee      Objective       Reji received therapeutic exercises (bold exercises performed)  to develop strength, endurance, ROM, flexibility, posture and core stabilization for 30 minutes including:  Upright bike x 5 min level 5   SLR flexion 4 x 10 2# L   Bridges 3 x 10  SAQ 4 x 10 2# L   +Shuttle MVP SL press 1 x 15 1 black band, 2 x 15 2 black bands   +Forward step up to SLS 2 x 10, 2 sec SLS, 4  inch step   Sidelying hip abduction 3 x 10 3#  Prone quad stretch on L  3 x 30 sec          Reji received the following manual therapy techniques: Joint mobilizations were applied to the: L knee for 0 minutes, including:  Patella mobs - all directions      Reji received cold pack for 00 minutes to L knee with heel prop.      Home Exercises Provided and Patient Education Provided     Education provided:   Written Home Exercises Provided: yes.  Exercises were reviewed and Reji was able to demonstrate them prior to the end of the session.  Reji demonstrated good  understanding of the education provided.     See EMR under Patient Instructions for exercises provided prior visit.    Assessment   The patient is cleared for WBAT with lateral  brace. Advanced WB exercises today without issues. Session limited secondary to patient arriving 17 minutes late.    .Reji is progressing well towards his goals.   Pt prognosis is Good.     Pt will continue to benefit from skilled outpatient physical therapy to address the deficits listed in the problem list box on initial evaluation, provide pt/family education and to maximize pt's level of independence in the home and community environment.   Pt's spiritual, cultural and educational needs considered and pt agreeable to plan of care and goals.     Anticipated barriers to physical therapy: COVID-19 Concerns    Goals:     Short Term GOALS: 6 weeks. Pt agrees with goals set. Updated 8/31/20  1. Patient demonstrates independence with HEP. met  2. Patient demonstrates increased strength BLE's by 1/3 muscle grade or greater to improve tolerance to functional activities pain free. progressing  3. Patient demonstrates increased 0 to 120 degrees to improve tolerance to functional activities pain free. met  4. Patient will report pain of 4/10 at worst, on 0-10 pain scale, with all activity In progress  5. Patient demonstrates ability to walk 500 feet indep without AD, appropriate  gait pattern, no pain provocation In progress    Long Term GOALS: 12 weeks. Pt agrees with goals set.Updated 8/31/20  1. Patient demonstrates increased L knee ROM symmetrical to R knee to improve tolerance to functional activities pain free. progressing  2. Patient demonstrates increased strength BLE's to 4+/5 or greater to improve tolerance to functional activities pain free.  progressing  3. Patient demonstrates improved overall function per FOTO Knee Survey to 50% Limitation or less. NT today due to time constraints  4. Patient will report pain of 2/10 at worst, on 0-10 pain scale, with all activity progressing  5. Patient demonstrates ability to walk 1 mile indep without AD , proper gait pattern, no pain provocation NT today due to crutches still in use  6. Patient demonstrates ability to perform typical work duties, without pain, appropriate movement pattern NT today due to crutches still in use    Plan     Continue with skilled PT. Limited open chain and progress  To full open chain in 4 weeks (week of 10/5/20)    EVELINA BADILLO, PT

## 2020-09-16 ENCOUNTER — CLINICAL SUPPORT (OUTPATIENT)
Dept: REHABILITATION | Facility: HOSPITAL | Age: 35
End: 2020-09-16
Attending: ORTHOPAEDIC SURGERY
Payer: COMMERCIAL

## 2020-09-16 DIAGNOSIS — R29.898 DECREASED STRENGTH OF LOWER EXTREMITY: ICD-10-CM

## 2020-09-16 DIAGNOSIS — M25.662 DECREASED RANGE OF MOTION (ROM) OF LEFT KNEE: ICD-10-CM

## 2020-09-16 DIAGNOSIS — R26.2 DIFFICULTY WALKING: ICD-10-CM

## 2020-09-16 DIAGNOSIS — M25.562 ACUTE PAIN OF LEFT KNEE: Primary | ICD-10-CM

## 2020-09-16 DIAGNOSIS — M23.92 ACUTE INTERNAL DERANGEMENT OF LEFT KNEE: ICD-10-CM

## 2020-09-16 PROCEDURE — 97110 THERAPEUTIC EXERCISES: CPT | Mod: PN

## 2020-09-16 NOTE — PROGRESS NOTES
"  Physical Therapy Daily Treatment Note     Name: Reji Pond Jr.  Clinic Number: [id number]    Therapy Diagnosis:   Encounter Diagnoses   Name Primary?    Decreased range of motion (ROM) of left knee     Decreased strength of lower extremity     Difficulty walking     Acute pain of left knee Yes    Acute internal derangement of left knee      Physician: Santo Shepherd MD    Visit Date: 9/16/2020    Physician Orders: PT Eval and Treat   Medical Diagnosis from Referral: Acute internal derangement of left knee  Evaluation Date: 8/3/2020  Authorization Period Expiration: 12/31/20  Plan of Care Expiration: 10/29/20  Visit # / Visits authorized: 11 / 20  Next PN 9/28/20    Time In: 1248  Time Out: 1:30pm  Total Billable Time: 42 minutes      DOS: 7/28/20 - Dr. Shepherd POW 7  1. Left Arthroscopy, Osteochondral Allograft 73059  2.  Left Arthroscopy, with meniscectomy (medial OR lateral) 48749, Lateral  3.  Left Arthroscopy, debridement/shaving of articular cartilage (chondroplasty) 72946    Precautions as 0f 9/10/20:   Full AAROM as tolerated  Full WBAT with lateral  brace   Limited open chain and progress  To full open chain in 4 weeks (week of 10/5/20)  Plan for full release in 3 months      Subjective     Pt reports: had some mild pain last night, but it has resolved. Missed previous appointment due to adverse weather. Plan is to return to work 6 months post-op.  He was compliant with home exercise program.  Response to previous treatment: no adverse reactions  Functional change:ambulating WBAT with brace     Pain: 0/10  Location: left knee      Objective       Reji received therapeutic exercises (bold exercises performed)  to develop strength, endurance, ROM, flexibility, posture and core stabilization for 30 minutes including:    Upright bike x 5 min level 5   +Standing Gastroc Str incline board 3x30"  +Standing HS Str on stairs 3x30"  +Step-up 4" 3x10 L LE  +Standing TKE c/ ball 3 x 10    +Wall Squat " c/ ball 3 x 10  Shuttle B LE Press 3-bands 3x10  +Shuttle MVP SL press  2 black bands 3x15  SLR flexion 3 x 15 3# L   Sidelying hip abduction 3 x 10 3#    +Forward step up to SLS 2 x 10, 2 sec SLS, 4 inch step   Bridges 3 x 10  SAQ 4 x 10 2# L       Prone quad stretch on L  3 x 30 sec    Reji received the following manual therapy techniques: Joint mobilizations were applied to the: L knee for 0 minutes, including:  Patella mobs - all directions      Reji received cold pack for 00 minutes to L knee with heel prop.      Home Exercises Provided and Patient Education Provided     Education provided:   Written Home Exercises Provided: yes.  Exercises were reviewed and Reji was able to demonstrate them prior to the end of the session.  Reji demonstrated good  understanding of the education provided.     See EMR under Patient Instructions for exercises provided prior visit.    Assessment     Reji tolerated treatment session well, with no acute provocation of pain. Good response to progression of closed-chain strength and endurance activities without excessive difficulty. Able to maintain neutral knee positioning with single-limb strengthening on shuttle with resistance band cueing.    The patient is cleared for WBAT with lateral  brace. Advanced WB exercises today without issues. Session limited secondary to patient arriving 17 minutes late.    .Reji is progressing well towards his goals.   Pt prognosis is Good.     Pt will continue to benefit from skilled outpatient physical therapy to address the deficits listed in the problem list box on initial evaluation, provide pt/family education and to maximize pt's level of independence in the home and community environment.   Pt's spiritual, cultural and educational needs considered and pt agreeable to plan of care and goals.     Anticipated barriers to physical therapy: COVID-19 Concerns    Goals:     Short Term GOALS: 6 weeks. Pt agrees with goals set. Updated  8/31/20  1. Patient demonstrates independence with HEP. met  2. Patient demonstrates increased strength BLE's by 1/3 muscle grade or greater to improve tolerance to functional activities pain free. progressing  3. Patient demonstrates increased 0 to 120 degrees to improve tolerance to functional activities pain free. met  4. Patient will report pain of 4/10 at worst, on 0-10 pain scale, with all activity In progress  5. Patient demonstrates ability to walk 500 feet indep without AD, appropriate gait pattern, no pain provocation In progress    Long Term GOALS: 12 weeks. Pt agrees with goals set.Updated 8/31/20  1. Patient demonstrates increased L knee ROM symmetrical to R knee to improve tolerance to functional activities pain free. progressing  2. Patient demonstrates increased strength BLE's to 4+/5 or greater to improve tolerance to functional activities pain free.  progressing  3. Patient demonstrates improved overall function per FOTO Knee Survey to 50% Limitation or less. NT today due to time constraints  4. Patient will report pain of 2/10 at worst, on 0-10 pain scale, with all activity progressing  5. Patient demonstrates ability to walk 1 mile indep without AD , proper gait pattern, no pain provocation NT today due to crutches still in use  6. Patient demonstrates ability to perform typical work duties, without pain, appropriate movement pattern NT today due to crutches still in use    Plan     Continue with skilled PT. Limited open chain and progress  To full open chain in 4 weeks (week of 10/5/20)    Danish Carter, PT

## 2020-09-17 ENCOUNTER — TELEPHONE (OUTPATIENT)
Dept: SPORTS MEDICINE | Facility: CLINIC | Age: 35
End: 2020-09-17

## 2020-09-17 NOTE — TELEPHONE ENCOUNTER
[employer] contacted regarding their call to the office earlier and a voice mail was left to call the office back at their convenience.        ----- Message from Mini Beal sent at 9/16/2020  4:16 PM CDT -----  Contact: Ofelia Galloway    ----- Message -----  From: Donnie Willis  Sent: 9/16/2020  10:30 AM CDT  To: Joao RICHTER Staff    Please call [employer] at [phone] x [phone]    Fax# [phone]    Claim# [id number]    Requesting update medical information regarding the patient disability    Thank you

## 2020-09-22 ENCOUNTER — DOCUMENTATION ONLY (OUTPATIENT)
Dept: REHABILITATION | Facility: HOSPITAL | Age: 35
End: 2020-09-22

## 2020-09-22 NOTE — PROGRESS NOTES
Missed Visit/Cancellation     Date: 09/22/2020      Canceled Number: 0  No Show Number: 5             Pt initially had visit scheduled today for 1330.  NS visit today. Pt is inconsistent with visits. Pt's next scheduled appointment is 9/24/20 at 1500.

## 2020-09-24 ENCOUNTER — DOCUMENTATION ONLY (OUTPATIENT)
Dept: REHABILITATION | Facility: HOSPITAL | Age: 35
End: 2020-09-24

## 2020-09-24 NOTE — PROGRESS NOTES
Physical Therapy    Patient did not show up for today's therapy session. Next scheduled appointment 9/29/20.    Cancel: 0    No Show: 4      Carlene House PT, DPT  9/24/2020

## 2020-09-26 DIAGNOSIS — S83.282D OTHER TEAR OF LATERAL MENISCUS OF LEFT KNEE AS CURRENT INJURY, SUBSEQUENT ENCOUNTER: ICD-10-CM

## 2020-09-26 DIAGNOSIS — M94.262 CHONDROMALACIA OF LEFT KNEE: ICD-10-CM

## 2020-09-26 DIAGNOSIS — M22.42 CHONDROMALACIA, PATELLA, LEFT: ICD-10-CM

## 2020-09-28 RX ORDER — OXYCODONE AND ACETAMINOPHEN 10; 325 MG/1; MG/1
1 TABLET ORAL EVERY 12 HOURS PRN
Qty: 20 TABLET | Refills: 0 | Status: SHIPPED | OUTPATIENT
Start: 2020-09-28 | End: 2021-07-19 | Stop reason: ALTCHOICE

## 2020-10-01 ENCOUNTER — DOCUMENTATION ONLY (OUTPATIENT)
Dept: REHABILITATION | Facility: HOSPITAL | Age: 35
End: 2020-10-01

## 2020-10-01 NOTE — PROGRESS NOTES
Physical Therapy    Patient did not show up for today's therapy session. He had to cancel his last appointment on 9/29/20 secondary to an insurance issue. PT contacted patient today and left voicemail regarding the missed visit.       No Show: 5      Carlene House PT, DPT  10/1/2020

## 2020-10-06 ENCOUNTER — DOCUMENTATION ONLY (OUTPATIENT)
Dept: REHABILITATION | Facility: HOSPITAL | Age: 35
End: 2020-10-06

## 2020-10-20 NOTE — PROGRESS NOTES
Subjective:          Chief Complaint: Reji Pond Jr. is a 35 y.o. male who had concerns including Post-op Evaluation of the Left Knee.    Patient presents to clinic for 3 months post op evaluation of left knee. Pain today is 4/10. Occasional nausea. Denies vomiting, fever, chill, CP, and SOB. Ambulating without a crutch. He is no longer attending formal PT at [facility] with [place] due to insurance issues. His job canceled his insurance due to his short term disability. He is currently taking Tramadol 50mg and Percocet . Doing well.         DATE OF PROCEDURE: 7/28/2020     ATTENDING SURGEON: Surgeon(s) and Role:     * Santo Shepherd MD - Primary     * Shelley Machuca MD - Fellow     ASSISTANTS:  Shelley Machuca MD - Fellow  SMA Ino - Assistant     PREOPERATIVE DIAGNOSIS:  Left  Chondromalacia, (excludes patella) M94.29 and Tear, Lateral meniscus, acute S83.289A     POSTOPERATIVE DIAGNOSIS:   Left  Chondromalacia, (excludes patella) M94.29, Internal derangement knee M23.90 and Tear, Lateral meniscus, acute S83.289A     PROCEDURES(S) PERFORMED:   1. Left  Arthroscopy, Osteochondral Allograft 27568  2.  Left  Arthroscopy, with meniscectomy (medial OR lateral) 37294, Lateral  3.  Left  Arthroscopy, debridement/shaving of articular cartilage (chondroplasty) 49589             Review of Systems   Constitution: Negative. Negative for chills, fever, weight gain and weight loss.   HENT: Negative.  Negative for congestion and sore throat.    Eyes: Negative.  Negative for blurred vision and double vision.   Cardiovascular: Negative.  Negative for chest pain, leg swelling and palpitations.   Respiratory: Negative.  Negative for cough and shortness of breath.    Endocrine: Negative.    Hematologic/Lymphatic: Negative.  Does not bruise/bleed easily.   Skin: Negative.  Negative for itching, poor wound healing and rash.   Musculoskeletal: Positive for joint pain, joint swelling and  stiffness. Negative for back pain, muscle weakness and myalgias.   Gastrointestinal: Negative.  Negative for abdominal pain, constipation, diarrhea, nausea and vomiting.   Genitourinary: Negative.  Negative for frequency and hematuria.   Neurological: Negative.  Negative for dizziness, headaches, numbness, paresthesias and sensory change.   Psychiatric/Behavioral: Negative.  Negative for altered mental status and depression. The patient is not nervous/anxious.    Allergic/Immunologic: Negative.  Negative for hives.                   Objective:        General: Reji is well-developed, well-nourished, appears stated age, in no acute distress, alert and oriented to time, place and person.     General    Vitals reviewed.  Constitutional: He is oriented to person, place, and time. He appears well-developed and well-nourished. No distress.   HENT:   Mouth/Throat: No oropharyngeal exudate.   Eyes: Right eye exhibits no discharge. Left eye exhibits no discharge.   Neck: Normal range of motion.   Cardiovascular: Normal rate and regular rhythm.    Pulmonary/Chest: Effort normal and breath sounds normal. No respiratory distress.   Neurological: He is alert and oriented to person, place, and time. He has normal reflexes. No cranial nerve deficit. Coordination normal.   Psychiatric: He has a normal mood and affect. His behavior is normal. Judgment and thought content normal.     General Musculoskeletal Exam   Gait: abnormal       Right Knee Exam     Inspection   Erythema: absent  Scars: absent  Swelling: absent  Effusion: absent  Deformity: absent  Bruising: absent    Tenderness   The patient is experiencing no tenderness.     Range of Motion   Extension: 0   Flexion: 140     Tests   Meniscus   Smitha:  Medial - negative Lateral - negative  Ligament Examination Lachman: normal (-1 to 2mm) PCL-Posterior Drawer: normal (0 to 2mm)     MCL - Valgus: normal (0 to 2mm)  LCL - Varus: normalPivot Shift: normal (Equal)Reverse Pivot  Shift: normal (Equal)Dial Test at 30 degrees: normal (< 5 degrees)Dial Test at 90 degrees: normal (< 5 degrees)  Posterior Sag Test: negative  Posterolateral Corner: unstable (>15 degrees difference)  Patella   Patellar apprehension: negative  Passive Patellar Tilt: neutral  Patellar Tracking: normal  Patellar Glide (quadrants): Lateral - 1   Medial - 2  Q-Angle at 90 degrees: normal  Patellar Grind: negative  J-Sign: none    Other   Meniscal Cyst: absent  Popliteal (Baker's) Cyst: absent  Sensation: normal    Left Knee Exam     Inspection   Erythema: absent  Scars: present  Swelling: absent  Effusion: absent  Deformity: absent  Bruising: absent    Tenderness   The patient tender to palpation of the lateral retinaculum (mild).    Range of Motion   Extension:  0 normal   Flexion:  130 abnormal     Tests   Meniscus   Smitha:  Medial - negative Lateral - negative  Stability Lachman: normal (-1 to 2mm) PCL-Posterior Drawer: normal (0 to 2mm)  MCL - Valgus: normal (0 to 2mm)  LCL - Varus: normal (0 to 2mm)Pivot Shift: normal (Equal)Reverse Pivot Shift: normal (Equal)Dial Test at 30 degrees: normal (< 5 degrees)Dial Test at 90 degrees: normal (< 5 degrees)  Posterior Sag Test: negative  Posterolateral Corner: unstable (>15 degrees difference)  Patella   Patellar apprehension: negative  Passive Patellar Tilt: neutral  Patellar Tracking: normal  Patellar Glide (Quadrants): Lateral - 1 Medial - 2  Q-Angle at 90 degrees: normal  Patellar Grind: negative  J-Sign: J sign absent    Other   Meniscal Cyst: absent  Popliteal (Baker's) Cyst: absent  Sensation: normal    Comments:  Aquacel bandage in place over incision. No signs of infection or necrosis. No purulent drainage. NVI  Suture tags trimmed. Portal suture removed.     Right Hip Exam     Tests   Jase: negative  Left Hip Exam     Tests   Jase: negative          Muscle Strength   Right Lower Extremity   Hip Abduction: 5/5   Quadriceps:  5/5   Hamstrin/5   Left Lower  Extremity   Hip Abduction: 5/5   Quadriceps:  4/5   Hamstrin/5     Reflexes     Left Side  Achilles:  2+  Quadriceps:  2+    Right Side   Achilles:  2+  Quadriceps:  2+    Vascular Exam     Right Pulses  Dorsalis Pedis:      2+  Posterior Tibial:      2+        Left Pulses  Dorsalis Pedis:      2+  Posterior Tibial:      2+        X-ray Knee Ortho Bilateral with Flexion  Narrative: EXAMINATION:  XR KNEE ORTHO BILAT WITH FLEXION    CLINICAL HISTORY:  Pain in right knee    TECHNIQUE:  AP standing of both knees, PA flexion standing views of both knees, and Merchant views of both knees were performed.  Lateral views of both knees were also performed.    COMPARISON:  None    FINDINGS:  No acute fracture or dislocation seen.  Subtle subchondral lucencies along the left lateral femoral condyle may represent tiny cysts.  No significant soft tissue edema or suprapatellar joint effusion.    No significant joint space narrowing.  Impression: No acute osseous abnormality seen.    Electronically signed by: Libertad Leung  Date:    10/21/2020  Time:    14:05              Assessment:       Encounter Diagnoses   Name Primary?    Pain in both knees, unspecified chronicity Yes    Chondromalacia of left knee     Chondromalacia, patella, left     Other tear of lateral meniscus of left knee as current injury, subsequent encounter           Plan:       1. IKDC, SF-12 and KOOS was filled out today in clinic.     3 months with Dr. Santo Shepherd  will fill out IKDC, SF-12, KOOS B/L knee XR    2. Tramadol 50mg sent to pharmacy today    3. PT orders placed- [facility]  HEP [id number] - Santo Shepherd MD and SMA Ino, instructed and demonstrated a CORE HEP.   The patient then demonstrated understanding of exercises and proper technique. This program was performed for 16 minutes.   Full AAROM as tolerated  Full WBAT with lateral  brace (provided today)  CORE  exercycle  Limited open chain and progress  To full open  chain in 4 weeks  Plan for full release at 6 months    4. Work note provided today: No work at this time. May return to light duty at 3 months post op. In about 6 weeks. Expected full duty at 6 months post op at  January 2021    5. HEP 41788 - Santo Shepherd MD and SMA Ino, instructed and demonstrated a CORE and gluteal HEP. The patient then demonstrated understanding of exercises and proper technique. This program was performed for 15 minutes.               Patient questionnaires may have been collected.

## 2020-10-21 ENCOUNTER — HOSPITAL ENCOUNTER (OUTPATIENT)
Dept: RADIOLOGY | Facility: HOSPITAL | Age: 35
Discharge: HOME | End: 2020-10-21
Attending: ORTHOPAEDIC SURGERY

## 2020-10-21 ENCOUNTER — OFFICE VISIT (OUTPATIENT)
Dept: SPORTS MEDICINE | Facility: CLINIC | Age: 35
End: 2020-10-21

## 2020-10-21 VITALS
WEIGHT: 237 LBS | SYSTOLIC BLOOD PRESSURE: 157 MMHG | DIASTOLIC BLOOD PRESSURE: 104 MMHG | HEART RATE: 77 BPM | HEIGHT: 72 IN | BODY MASS INDEX: 32.1 KG/M2

## 2020-10-21 DIAGNOSIS — M94.262 CHONDROMALACIA OF LEFT KNEE: ICD-10-CM

## 2020-10-21 DIAGNOSIS — S83.282D OTHER TEAR OF LATERAL MENISCUS OF LEFT KNEE AS CURRENT INJURY, SUBSEQUENT ENCOUNTER: ICD-10-CM

## 2020-10-21 DIAGNOSIS — M25.561 PAIN IN BOTH KNEES, UNSPECIFIED CHRONICITY: ICD-10-CM

## 2020-10-21 DIAGNOSIS — M25.562 PAIN IN BOTH KNEES, UNSPECIFIED CHRONICITY: ICD-10-CM

## 2020-10-21 DIAGNOSIS — M25.561 PAIN IN BOTH KNEES, UNSPECIFIED CHRONICITY: Primary | ICD-10-CM

## 2020-10-21 DIAGNOSIS — M22.42 CHONDROMALACIA, PATELLA, LEFT: ICD-10-CM

## 2020-10-21 DIAGNOSIS — M25.562 PAIN IN BOTH KNEES, UNSPECIFIED CHRONICITY: Primary | ICD-10-CM

## 2020-10-21 PROCEDURE — 99024 POSTOP FOLLOW-UP VISIT: CPT | Mod: ,,, | Performed by: ORTHOPAEDIC SURGERY

## 2020-10-21 PROCEDURE — 97110 THERAPEUTIC EXERCISES: CPT | Mod: PBBFAC | Performed by: ORTHOPAEDIC SURGERY

## 2020-10-21 PROCEDURE — 73564 XR KNEE ORTHO BILAT WITH FLEXION: ICD-10-PCS | Mod: 26,50,, | Performed by: RADIOLOGY

## 2020-10-21 PROCEDURE — 99214 OFFICE O/P EST MOD 30 MIN: CPT | Mod: PBBFAC,25 | Performed by: ORTHOPAEDIC SURGERY

## 2020-10-21 PROCEDURE — 73564 X-RAY EXAM KNEE 4 OR MORE: CPT | Mod: 26,50,, | Performed by: RADIOLOGY

## 2020-10-21 PROCEDURE — 99999 PR PBB SHADOW E&M-EST. PATIENT-LVL IV: CPT | Mod: PBBFAC,,, | Performed by: ORTHOPAEDIC SURGERY

## 2020-10-21 PROCEDURE — 99999 PR PBB SHADOW E&M-EST. PATIENT-LVL IV: ICD-10-PCS | Mod: PBBFAC,,, | Performed by: ORTHOPAEDIC SURGERY

## 2020-10-21 PROCEDURE — 99024 PR POST-OP FOLLOW-UP VISIT: ICD-10-PCS | Mod: ,,, | Performed by: ORTHOPAEDIC SURGERY

## 2020-10-21 PROCEDURE — 73564 X-RAY EXAM KNEE 4 OR MORE: CPT | Mod: TC,50

## 2020-10-21 RX ORDER — TRAMADOL HYDROCHLORIDE 50 MG/1
50 TABLET ORAL
Qty: 30 EACH | Refills: 0 | Status: SHIPPED | OUTPATIENT
Start: 2020-10-21 | End: 2021-07-19 | Stop reason: ALTCHOICE

## 2020-10-21 NOTE — PROGRESS NOTES
Pt didn't present for scheduled PT follow-up on 10/20/20. Pt last seen on 9/16/20, next scheduled appt on 10/22.    Danish Carter, PT

## 2020-10-21 NOTE — LETTER
Patient: Reji Pond Jr.   YOB: 1985   Clinic Number: [id number]   Today's Date: October 21, 2020     To:    Fax Number:         Work Status Summary     Date of Injury: 8/18/2018  Diagnosis/ICD-10: left knee internal derangement / M23.91    Work Status: NOT ABLE to work at present. Estimated release to return to work is January 2021.    Therapy Recomendations: Physical Therapy 2 times a week for 6 weeks.    Medications Ordered This Encounter   Medications    traMADoL (ULTRAM) 50 mg tablet       Next Appointment: Follow up in about 3 months (around 1/21/2021), or 3 months with Dr. Santo Shepherd  will fill out IKDC, SF-12, KOOS B/L knee XR, for 3 months with Dr. Santo Shepherd  will fill out IKDC, SF-12, KOOS B/L knee XR. Reji will be seen by Dr. Santo Shepherd.    Comments: PT:   PT orders placed- [facility]  HEP [id number] - Santo Shepherd MD and SMA Ino, instructed and demonstrated a CORE HEP.   The patient then demonstrated understanding of exercises and proper technique. This program was performed for 16 minutes.   Full AAROM as tolerated  Full WBAT with lateral  brace (provided today)  CORE  exercycle  Limited open chain and progress  To full open chain in 4 weeks  Plan for full release at 6 months      Work note provided today: No work at this time. May return to light duty at 3 months post op. In about 6 weeks. Expected full duty at 6 months post op at  January 2021      ____________________________         October 21, 2020    Santo Shepherd MD  Signed (Provider)

## 2020-10-22 ENCOUNTER — TELEPHONE (OUTPATIENT)
Dept: SPORTS MEDICINE | Facility: CLINIC | Age: 35
End: 2020-10-22

## 2020-10-22 NOTE — TELEPHONE ENCOUNTER
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 She was understanding.     ----- Message from Donnie Willis sent at 10/22/2020  8:33 AM CDT -----  Contact: [employer]/ [facility]  Please call [employer] at [phone] x [phone]    Fax# [phone]    Claim# [id number]    Would like to know the patient current work status and has the patient been released to return to work    Thank you

## 2020-10-26 ENCOUNTER — PATIENT MESSAGE (OUTPATIENT)
Dept: SPORTS MEDICINE | Facility: CLINIC | Age: 35
End: 2020-10-26

## 2020-11-10 ENCOUNTER — PATIENT MESSAGE (OUTPATIENT)
Dept: SPORTS MEDICINE | Facility: CLINIC | Age: 35
End: 2020-11-10

## 2020-11-10 NOTE — LETTER
Patient: Reji Pond Jr.   YOB: 1985   Clinic Number: [id number]   Today's Date: November 11, 2020     To:    Fax Number:          Work Status Summary     Date of Injury: 8/18/2018  Diagnosis/ICD-10: left knee internal derangement / M23.91    Work Status: Full return back to work with  brace in place.      Therapy Recomendations: Physical Therapy 2 times a week for 6 weeks.    Next Appointment: Follow up in about 3 months (around 1/21/2021), or 3 months with Dr. Santo Shepherd  will fill out IKDC, SF-12, KOOS B/L knee XR, for 3 months with Dr. Santo Shepherd  will fill out IKDC, SF-12, KOOS B/L knee XR. Reji will be seen by Dr. Santo Shepherd.    Comments:   Work note provided today: May return to full duty at this time with  BRACE IN PLACE WHILE WORKING.      ____________________________         October 21, 2020    Santo Shepherd MD  Signed (Provider)

## 2020-11-11 ENCOUNTER — TELEPHONE (OUTPATIENT)
Dept: SPORTS MEDICINE | Facility: CLINIC | Age: 35
End: 2020-11-11

## 2020-11-11 ENCOUNTER — PATIENT MESSAGE (OUTPATIENT)
Dept: SPORTS MEDICINE | Facility: CLINIC | Age: 35
End: 2020-11-11

## 2020-11-11 NOTE — TELEPHONE ENCOUNTER
Patient verified that he has the proper brace through epic.     Sending letter to patient through patient portal.    Grabiel Shaw MS, OTC   Sports Medicine Assistant   [facility]

## 2020-11-11 NOTE — TELEPHONE ENCOUNTER
Called patient to update and let them know that Dr. Shepherd is reviewing and we will let him know today.    Grabiel Shaw, MS, OTC   Sports Medicine Assistant   [facility]         ----- Message from Grabiel Shaw sent at 11/10/2020  5:25 PM CST -----  Contact: self @ [phone]    ----- Message -----  From: Jody Farrell  Sent: 11/10/2020   8:26 AM CST  To: Joao RICHTER Staff    Pt would liek to speak with someone about getting a return to work note.  Pls call.

## 2020-11-11 NOTE — TELEPHONE ENCOUNTER
Patient communication:    Per Dr. Shepherd he can go back to work with  brace in place.     Patient is verifying that he has the  brace with picture sent through the patient portal.    Will send letter to patient after we have verified the brace.    Grabiel Shaw MS, OTC   Sports Medicine Assistant   [facility]

## 2021-01-22 ENCOUNTER — TELEPHONE (OUTPATIENT)
Dept: SPORTS MEDICINE | Facility: CLINIC | Age: 36
End: 2021-01-22

## 2021-02-15 ENCOUNTER — PATIENT MESSAGE (OUTPATIENT)
Dept: SPORTS MEDICINE | Facility: CLINIC | Age: 36
End: 2021-02-15

## 2021-02-15 ENCOUNTER — TELEPHONE (OUTPATIENT)
Dept: SPORTS MEDICINE | Facility: CLINIC | Age: 36
End: 2021-02-15

## 2021-04-06 ENCOUNTER — TELEPHONE (OUTPATIENT)
Dept: SPORTS MEDICINE | Facility: CLINIC | Age: 36
End: 2021-04-06

## 2021-04-07 ENCOUNTER — PATIENT MESSAGE (OUTPATIENT)
Dept: SPORTS MEDICINE | Facility: CLINIC | Age: 36
End: 2021-04-07

## 2021-04-26 ENCOUNTER — TELEPHONE (OUTPATIENT)
Dept: SPORTS MEDICINE | Facility: CLINIC | Age: 36
End: 2021-04-26

## 2021-04-28 ENCOUNTER — TELEPHONE (OUTPATIENT)
Dept: SPORTS MEDICINE | Facility: CLINIC | Age: 36
End: 2021-04-28

## 2021-05-05 ENCOUNTER — DOCUMENTATION ONLY (OUTPATIENT)
Dept: RESEARCH | Facility: HOSPITAL | Age: 36
End: 2021-05-05

## 2021-05-06 ENCOUNTER — PATIENT MESSAGE (OUTPATIENT)
Dept: RESEARCH | Facility: HOSPITAL | Age: 36
End: 2021-05-06

## 2021-06-30 ENCOUNTER — TELEPHONE (OUTPATIENT)
Dept: SPORTS MEDICINE | Facility: CLINIC | Age: 36
End: 2021-06-30

## 2021-07-15 ENCOUNTER — OCCUPATIONAL HEALTH (OUTPATIENT)
Dept: URGENT CARE | Facility: CLINIC | Age: 36
End: 2021-07-15

## 2021-07-15 DIAGNOSIS — Z02.1 PRE-EMPLOYMENT EXAMINATION: Primary | ICD-10-CM

## 2021-07-15 PROCEDURE — 80305 DRUG TEST PRSMV DIR OPT OBS: CPT | Mod: S$GLB,,, | Performed by: PHYSICIAN ASSISTANT

## 2021-07-15 PROCEDURE — 99499 UNLISTED E&M SERVICE: CPT | Mod: S$GLB,,, | Performed by: PHYSICIAN ASSISTANT

## 2021-07-15 PROCEDURE — 99499 PHYSICAL, BASIC COMPLEXITY: ICD-10-PCS | Mod: S$GLB,,, | Performed by: PHYSICIAN ASSISTANT

## 2021-07-15 PROCEDURE — 80305 OOH NON-DOT DRUG SCREEN: ICD-10-PCS | Mod: S$GLB,,, | Performed by: PHYSICIAN ASSISTANT

## 2021-07-16 ENCOUNTER — PATIENT MESSAGE (OUTPATIENT)
Dept: INTERNAL MEDICINE | Facility: CLINIC | Age: 36
End: 2021-07-16

## 2021-07-16 ENCOUNTER — TELEPHONE (OUTPATIENT)
Dept: INTERNAL MEDICINE | Facility: CLINIC | Age: 36
End: 2021-07-16

## 2021-07-19 ENCOUNTER — OFFICE VISIT (OUTPATIENT)
Dept: INTERNAL MEDICINE | Facility: CLINIC | Age: 36
End: 2021-07-19

## 2021-07-19 VITALS
WEIGHT: 229.25 LBS | TEMPERATURE: 97 F | BODY MASS INDEX: 31.05 KG/M2 | SYSTOLIC BLOOD PRESSURE: 150 MMHG | OXYGEN SATURATION: 99 % | HEIGHT: 72 IN | DIASTOLIC BLOOD PRESSURE: 100 MMHG | HEART RATE: 60 BPM

## 2021-07-19 DIAGNOSIS — K76.0 FATTY LIVER: ICD-10-CM

## 2021-07-19 DIAGNOSIS — K08.89 PAIN, DENTAL: ICD-10-CM

## 2021-07-19 DIAGNOSIS — I10 ESSENTIAL HYPERTENSION: Primary | ICD-10-CM

## 2021-07-19 DIAGNOSIS — E66.09 CLASS 1 OBESITY DUE TO EXCESS CALORIES WITHOUT SERIOUS COMORBIDITY WITH BODY MASS INDEX (BMI) OF 30.0 TO 30.9 IN ADULT: ICD-10-CM

## 2021-07-19 PROBLEM — E66.811 CLASS 1 OBESITY DUE TO EXCESS CALORIES WITHOUT SERIOUS COMORBIDITY WITH BODY MASS INDEX (BMI) OF 30.0 TO 30.9 IN ADULT: Status: ACTIVE | Noted: 2021-07-19

## 2021-07-19 PROBLEM — S09.93XA LIP INJURY, INITIAL ENCOUNTER: Status: RESOLVED | Noted: 2020-07-29 | Resolved: 2021-07-19

## 2021-07-19 PROCEDURE — 99999 PR PBB SHADOW E&M-EST. PATIENT-LVL III: ICD-10-PCS | Mod: PBBFAC,,, | Performed by: STUDENT IN AN ORGANIZED HEALTH CARE EDUCATION/TRAINING PROGRAM

## 2021-07-19 PROCEDURE — 99203 PR OFFICE/OUTPT VISIT, NEW, LEVL III, 30-44 MIN: ICD-10-PCS | Mod: S$PBB,,, | Performed by: STUDENT IN AN ORGANIZED HEALTH CARE EDUCATION/TRAINING PROGRAM

## 2021-07-19 PROCEDURE — 99203 OFFICE O/P NEW LOW 30 MIN: CPT | Mod: S$PBB,,, | Performed by: STUDENT IN AN ORGANIZED HEALTH CARE EDUCATION/TRAINING PROGRAM

## 2021-07-19 PROCEDURE — 99213 OFFICE O/P EST LOW 20 MIN: CPT | Mod: PBBFAC,PO | Performed by: STUDENT IN AN ORGANIZED HEALTH CARE EDUCATION/TRAINING PROGRAM

## 2021-07-19 PROCEDURE — 99999 PR PBB SHADOW E&M-EST. PATIENT-LVL III: CPT | Mod: PBBFAC,,, | Performed by: STUDENT IN AN ORGANIZED HEALTH CARE EDUCATION/TRAINING PROGRAM

## 2021-07-19 RX ORDER — LOSARTAN POTASSIUM 50 MG/1
50 TABLET ORAL DAILY
Qty: 90 TABLET | Refills: 3 | Status: CANCELLED | OUTPATIENT
Start: 2021-07-19 | End: 2022-07-19

## 2021-07-19 RX ORDER — AMLODIPINE BESYLATE 10 MG/1
10 TABLET ORAL DAILY
Qty: 30 TABLET | Refills: 11 | Status: ON HOLD | OUTPATIENT
Start: 2021-07-19 | End: 2022-09-06 | Stop reason: HOSPADM

## 2021-07-19 RX ORDER — AMOXICILLIN 875 MG/1
875 TABLET, FILM COATED ORAL EVERY 12 HOURS
Qty: 14 TABLET | Refills: 0 | Status: SHIPPED | OUTPATIENT
Start: 2021-07-19 | End: 2021-07-26

## 2021-07-20 ENCOUNTER — PATIENT MESSAGE (OUTPATIENT)
Dept: INTERNAL MEDICINE | Facility: CLINIC | Age: 36
End: 2021-07-20

## 2021-07-20 ENCOUNTER — TELEPHONE (OUTPATIENT)
Dept: INTERNAL MEDICINE | Facility: CLINIC | Age: 36
End: 2021-07-20

## 2022-06-06 ENCOUNTER — HOSPITAL ENCOUNTER (EMERGENCY)
Facility: HOSPITAL | Age: 37
Discharge: HOME | End: 2022-06-06
Attending: EMERGENCY MEDICINE
Payer: COMMERCIAL

## 2022-06-06 VITALS
RESPIRATION RATE: 18 BRPM | HEART RATE: 71 BPM | OXYGEN SATURATION: 99 % | TEMPERATURE: 99 F | BODY MASS INDEX: 30.52 KG/M2 | DIASTOLIC BLOOD PRESSURE: 107 MMHG | SYSTOLIC BLOOD PRESSURE: 186 MMHG | WEIGHT: 225 LBS

## 2022-06-06 DIAGNOSIS — K08.89 PAIN, DENTAL: Primary | ICD-10-CM

## 2022-06-06 PROCEDURE — 99281 PR EMERGENCY DEPT VISIT,LEVEL I: ICD-10-PCS | Mod: ,,, | Performed by: EMERGENCY MEDICINE

## 2022-06-06 PROCEDURE — 99281 EMR DPT VST MAYX REQ PHY/QHP: CPT | Mod: ,,, | Performed by: EMERGENCY MEDICINE

## 2022-06-06 PROCEDURE — 99284 EMERGENCY DEPT VISIT MOD MDM: CPT

## 2022-06-06 RX ORDER — MELOXICAM 15 MG/1
15 TABLET ORAL DAILY
Qty: 14 TABLET | Refills: 0 | Status: SHIPPED | OUTPATIENT
Start: 2022-06-06 | End: 2022-09-02

## 2022-06-06 RX ORDER — PENICILLIN V POTASSIUM 500 MG/1
500 TABLET, FILM COATED ORAL 4 TIMES DAILY
Qty: 40 TABLET | Refills: 0 | Status: SHIPPED | OUTPATIENT
Start: 2022-06-06 | End: 2022-06-13

## 2022-06-07 NOTE — ED PROVIDER NOTES
Encounter Date: 6/6/2022    SCRIBE #1 NOTE: I, Stu Roberts, am scribing for, and in the presence of,  Hiwot Ibarra MD. I have scribed the following portions of the note - Other sections scribed: HPI, ROS.   SCRIBE #2 NOTE: IKat, am scribing for, and in the presence of,  Hiwot Ibarra MD. I have scribed the following portions of the note - Other sections scribed: HPI, ROS.     History     Chief Complaint   Patient presents with    Dental Pain     Back L wisdom tooth pain for the past few days.      HPI   Time patient was seen by the provider: 10:13 PM    The patient is a 36 y.o. male with co-morbidities including: HTN who presents to the ED with a complaint of left upper wisdom tooth pain with onset 3 weeks ago and is worsening. For the pain, he has been taking tylenol which did not help and an anti-inflammatory medicine that was left over from a knee surgery he had which helped slightly. He says he has not experiencing any drainage or fever. He does not have a dentist or dental insurance.    The history is provided by the patient, and medical records. No  was used.     Review of patient's allergies indicates:  No Known Allergies  Past Medical History:   Diagnosis Date    Elevated liver function tests 10/25/2018    Fatty liver 10/25/2018    Hypertension     Kidney stones      Past Surgical History:   Procedure Laterality Date    CHONDROPLASTY OF KNEE Left 7/28/2020    Procedure: CHONDROPLASTY, KNEE;  Surgeon: Santo Shepherd MD;  Location: [facility];  Service: Orthopedics;  Laterality: Left;    KNEE ARTHROSCOPY W/ MENISCECTOMY Left 7/28/2020    Procedure: ARTHROSCOPY, KNEE, WITH MENISCECTOMY;  Surgeon: Santo Shepherd MD;  Location: [facility] OR;  Service: Orthopedics;  Laterality: Left;    KNEE ARTHROSCOPY W/ OATS PROCEDURE Left 7/28/2020    Procedure: REPAIR, KNEE, ARTHROSCOPIC, WITH OSTEOCHONDRAL GRAFT TRANSFER;  Surgeon: Santo Shepherd MD;  Location: [facility] OR;   Service: Orthopedics;  Laterality: Left;  Regional W/ Catheter: Epidural, Spinal, Adductor  TRACI 50cc      Family History   Problem Relation Age of Onset    Heart failure Father      Social History     Tobacco Use    Smoking status: Former Smoker    Smokeless tobacco: Never Used   Substance Use Topics    Alcohol use: Yes     Comment: occasionally     Drug use: No     Review of Systems   General: No fever.  No chills.  Eyes: No visual changes.  Head: No headache. + dental pain (L back upper)  Integument: No rashes or lesions.  Abdomen: No abdominal pain.  No nausea or vomiting.  Neurologic: No focal weakness.  No numbness.      Physical Exam     Initial Vitals [06/06/22 2049]   BP Pulse Resp Temp SpO2   (!) 186/107 71 18 98.5 °F (36.9 °C) 99 %      MAP       --         Physical Exam   Appearance: No acute distress.  HEENT: Normocephalic. Atraumatic. No conjunctival injection. EOMI. PERRL. Poor dentition throughout. No gingival erythema, fluctuance. No drainage. Airway intact. Uvula midline. No posterior erythema, edema, exudate. No sublingual edema.     Neck: No JVD. Neck supple.    Chest: Non-tender. No respiratory distress  Cardiovascular: Regular rate and rhythm. +2 radial pulses bilaterally.  Abdomen: Not distended   Musculoskeletal: Good range of motion all joints. No deformities.    Neurologic: Alert and oriented x 3.  Equal strength in upper and lower extremities bilaterally. Normal sensation. No facial droop. Normal speech.    Psych:  Appropriate, conversant   Integumentary: No rashes seen.  Good turgor.  No abrasions.  No ecchymoses.      ED Course   Procedures  Labs Reviewed - No data to display       Imaging Results    None          Medications - No data to display  Medical Decision Making:   History:   Old Medical Records: I decided to obtain old medical records.          Scribe Attestation:   Scribe #1: I performed the above scribed service and the documentation accurately describes the services I  performed. I attest to the accuracy of the note.  Scribe #2: I performed the above scribed service and the documentation accurately describes the services I performed. I attest to the accuracy of the note.                 Clinical Impression:   Final diagnoses:  [K08.89] Pain, dental (Primary)         35 yo male presetns for dental pain x 3 wks. Pt hypertensive, otherwise VSS, afeb. Poor dentition throughout. No gingival erythema, fluctuance. No drainage. Airway intact. Uvula midline. No posterior erythema, edema, exudate. No sublingual edema. Advised mobic with food. Counseled on proper admin of benzocaine. Provided dental resource list.    Discussed results, diagnosis, and treatment plan with pt; advised close follow-up with PCP. Reviewed strict return precautions. Pt confirms understanding and ability to comply.       ED Disposition Condition    Discharge Stable        ED Prescriptions     Medication Sig Dispense Start Date End Date Auth. Provider    penicillin v potassium (VEETID) 500 MG tablet Take 1 tablet (500 mg total) by mouth 4 (four) times daily. for 7 days 40 tablet 6/6/2022 6/13/2022 Hiwot Ibarra MD    benzocaine (HURRICAINE) 20 % Gel Use as directed 1 application in the mouth or throat 4 (four) times daily as needed. 1 each 6/6/2022  Hiwot Ibarra MD    meloxicam (MOBIC) 15 MG tablet Take 1 tablet (15 mg total) by mouth once daily. 14 tablet 6/6/2022  Hiwot Ibarra MD        Follow-up Information     Follow up With Specialties Details Why Contact Info    your dentist  Schedule an appointment as soon as possible for a visit              Hiwot Ibarra MD  06/07/22 0053

## 2022-06-07 NOTE — ED TRIAGE NOTES
Reji Pond Jr., a 36 y.o. male presents to the ED w/ complaint of     Triage note:  Chief Complaint   Patient presents with    Dental Pain     Back L wisdom tooth pain for the past few days.      Review of patient's allergies indicates:  No Known Allergies  Past Medical History:   Diagnosis Date    Elevated liver function tests 10/25/2018    Fatty liver 10/25/2018    Hypertension     Kidney stones

## 2022-07-07 ENCOUNTER — PATIENT MESSAGE (OUTPATIENT)
Dept: INTERNAL MEDICINE | Facility: CLINIC | Age: 37
End: 2022-07-07
Payer: COMMERCIAL

## 2022-09-02 ENCOUNTER — HOSPITAL ENCOUNTER (OUTPATIENT)
Facility: HOSPITAL | Age: 37
Discharge: HOME | End: 2022-09-06
Attending: EMERGENCY MEDICINE | Admitting: INTERNAL MEDICINE
Payer: COMMERCIAL

## 2022-09-02 DIAGNOSIS — R79.89 ELEVATED TROPONIN: ICD-10-CM

## 2022-09-02 DIAGNOSIS — R55 SYNCOPE, UNSPECIFIED SYNCOPE TYPE: ICD-10-CM

## 2022-09-02 DIAGNOSIS — R07.81 RIB PAIN: ICD-10-CM

## 2022-09-02 DIAGNOSIS — I10 HYPERTENSION, UNSPECIFIED TYPE: ICD-10-CM

## 2022-09-02 DIAGNOSIS — R55 SYNCOPE: Primary | ICD-10-CM

## 2022-09-02 DIAGNOSIS — R07.9 CHEST PAIN: ICD-10-CM

## 2022-09-02 LAB
ALBUMIN SERPL BCP-MCNC: 4 G/DL (ref 3.5–5.2)
ALP SERPL-CCNC: 114 U/L (ref 55–135)
ALT SERPL W/O P-5'-P-CCNC: 23 U/L (ref 10–44)
AMPHET+METHAMPHET UR QL: NEGATIVE
ANION GAP SERPL CALC-SCNC: 7 MMOL/L (ref 8–16)
ASCENDING AORTA: 2.78 CM
AST SERPL-CCNC: 18 U/L (ref 10–40)
AV INDEX (PROSTH): 0.7
AV MEAN GRADIENT: 4 MMHG
AV PEAK GRADIENT: 9 MMHG
AV VALVE AREA: 2.21 CM2
AV VELOCITY RATIO: 0.59
BARBITURATES UR QL SCN>200 NG/ML: NEGATIVE
BASOPHILS # BLD AUTO: 0.04 K/UL (ref 0–0.2)
BASOPHILS NFR BLD: 0.6 % (ref 0–1.9)
BENZODIAZ UR QL SCN>200 NG/ML: NEGATIVE
BILIRUB SERPL-MCNC: 0.2 MG/DL (ref 0.1–1)
BILIRUB UR QL STRIP: NEGATIVE
BSA FOR ECHO PROCEDURE: 2.3 M2
BUN SERPL-MCNC: 14 MG/DL (ref 6–20)
BZE UR QL SCN: NEGATIVE
CALCIUM SERPL-MCNC: 9.3 MG/DL (ref 8.7–10.5)
CANNABINOIDS UR QL SCN: NEGATIVE
CHLORIDE SERPL-SCNC: 109 MMOL/L (ref 95–110)
CLARITY UR REFRACT.AUTO: CLEAR
CO2 SERPL-SCNC: 24 MMOL/L (ref 23–29)
COLOR UR AUTO: COLORLESS
CREAT SERPL-MCNC: 0.9 MG/DL (ref 0.5–1.4)
CREAT UR-MCNC: 85 MG/DL (ref 23–375)
CV ECHO LV RWT: 0.39 CM
D DIMER PPP IA.FEU-MCNC: 0.81 MG/L FEU
DIFFERENTIAL METHOD: ABNORMAL
DOP CALC AO PEAK VEL: 1.48 M/S
DOP CALC AO VTI: 26.83 CM
DOP CALC LVOT AREA: 3.2 CM2
DOP CALC LVOT DIAMETER: 2.01 CM
DOP CALC LVOT PEAK VEL: 0.87 M/S
DOP CALC LVOT STROKE VOLUME: 59.37 CM3
DOP CALCLVOT PEAK VEL VTI: 18.72 CM
E WAVE DECELERATION TIME: 206.72 MSEC
E/A RATIO: 1.24
E/E' RATIO: 10.44 M/S
ECHO LV POSTERIOR WALL: 1 CM (ref 0.6–1.1)
EJECTION FRACTION: 65 %
EOSINOPHIL # BLD AUTO: 0.2 K/UL (ref 0–0.5)
EOSINOPHIL NFR BLD: 2.3 % (ref 0–8)
ERYTHROCYTE [DISTWIDTH] IN BLOOD BY AUTOMATED COUNT: 13.9 % (ref 11.5–14.5)
EST. GFR  (NO RACE VARIABLE): >60 ML/MIN/1.73 M^2
FRACTIONAL SHORTENING: 34 % (ref 28–44)
GLUCOSE SERPL-MCNC: 100 MG/DL (ref 70–110)
GLUCOSE UR QL STRIP: NEGATIVE
HCT VFR BLD AUTO: 46.1 % (ref 40–54)
HCV AB SERPL QL IA: NORMAL
HGB BLD-MCNC: 15.6 G/DL (ref 14–18)
HGB UR QL STRIP: ABNORMAL
HIV 1+2 AB+HIV1 P24 AG SERPL QL IA: NORMAL
IMM GRANULOCYTES # BLD AUTO: 0.04 K/UL (ref 0–0.04)
IMM GRANULOCYTES NFR BLD AUTO: 0.6 % (ref 0–0.5)
INTERVENTRICULAR SEPTUM: 0.95 CM (ref 0.6–1.1)
KETONES UR QL STRIP: NEGATIVE
LA MAJOR: 5.11 CM
LA MINOR: 5.1 CM
LA WIDTH: 4.31 CM
LEFT ATRIUM SIZE: 4.05 CM
LEFT ATRIUM VOLUME INDEX MOD: 24.3 ML/M2
LEFT ATRIUM VOLUME INDEX: 33.5 ML/M2
LEFT ATRIUM VOLUME MOD: 54.81 CM3
LEFT ATRIUM VOLUME: 75.74 CM3
LEFT INTERNAL DIMENSION IN SYSTOLE: 3.38 CM (ref 2.1–4)
LEFT VENTRICLE DIASTOLIC VOLUME INDEX: 55.22 ML/M2
LEFT VENTRICLE DIASTOLIC VOLUME: 124.8 ML
LEFT VENTRICLE MASS INDEX: 81 G/M2
LEFT VENTRICLE SYSTOLIC VOLUME INDEX: 20.7 ML/M2
LEFT VENTRICLE SYSTOLIC VOLUME: 46.89 ML
LEFT VENTRICULAR INTERNAL DIMENSION IN DIASTOLE: 5.12 CM (ref 3.5–6)
LEFT VENTRICULAR MASS: 182.95 G
LEUKOCYTE ESTERASE UR QL STRIP: NEGATIVE
LV LATERAL E/E' RATIO: 9.4 M/S
LV SEPTAL E/E' RATIO: 11.75 M/S
LYMPHOCYTES # BLD AUTO: 1.9 K/UL (ref 1–4.8)
LYMPHOCYTES NFR BLD: 28 % (ref 18–48)
MAGNESIUM SERPL-MCNC: 1.8 MG/DL (ref 1.6–2.6)
MCH RBC QN AUTO: 26.9 PG (ref 27–31)
MCHC RBC AUTO-ENTMCNC: 33.8 G/DL (ref 32–36)
MCV RBC AUTO: 79 FL (ref 82–98)
METHADONE UR QL SCN>300 NG/ML: NEGATIVE
MONOCYTES # BLD AUTO: 0.5 K/UL (ref 0.3–1)
MONOCYTES NFR BLD: 7.1 % (ref 4–15)
MV A" WAVE DURATION": 10.85 MSEC
MV PEAK A VEL: 0.76 M/S
MV PEAK E VEL: 0.94 M/S
MV STENOSIS PRESSURE HALF TIME: 59.95 MS
MV VALVE AREA P 1/2 METHOD: 3.67 CM2
NEUTROPHILS # BLD AUTO: 4.3 K/UL (ref 1.8–7.7)
NEUTROPHILS NFR BLD: 61.4 % (ref 38–73)
NITRITE UR QL STRIP: NEGATIVE
NRBC BLD-RTO: 0 /100 WBC
OPIATES UR QL SCN: NEGATIVE
PCP UR QL SCN>25 NG/ML: NEGATIVE
PH UR STRIP: 7 [PH] (ref 5–8)
PLATELET # BLD AUTO: 246 K/UL (ref 150–450)
PMV BLD AUTO: 10.9 FL (ref 9.2–12.9)
POCT GLUCOSE: 90 MG/DL (ref 70–110)
POTASSIUM SERPL-SCNC: 3.4 MMOL/L (ref 3.5–5.1)
PROT SERPL-MCNC: 7.8 G/DL (ref 6–8.4)
PROT UR QL STRIP: NEGATIVE
PULM VEIN S/D RATIO: 0.77
PV PEAK D VEL: 0.57 M/S
PV PEAK S VEL: 0.44 M/S
RA MAJOR: 4.5 CM
RA PRESSURE: 3 MMHG
RA WIDTH: 3.39 CM
RBC # BLD AUTO: 5.81 M/UL (ref 4.6–6.2)
RIGHT VENTRICULAR END-DIASTOLIC DIMENSION: 3.48 CM
RV TISSUE DOPPLER FREE WALL SYSTOLIC VELOCITY 1 (APICAL 4 CHAMBER VIEW): 10.73 CM/S
SARS-COV-2 RDRP RESP QL NAA+PROBE: NEGATIVE
SINUS: 2.61 CM
SODIUM SERPL-SCNC: 140 MMOL/L (ref 136–145)
SP GR UR STRIP: 1.01 (ref 1–1.03)
STJ: 2.36 CM
TDI LATERAL: 0.1 M/S
TDI SEPTAL: 0.08 M/S
TDI: 0.09 M/S
TOXICOLOGY INFORMATION: NORMAL
TRICUSPID ANNULAR PLANE SYSTOLIC EXCURSION: 1.88 CM
TROPONIN I SERPL DL<=0.01 NG/ML-MCNC: 0.05 NG/ML (ref 0–0.03)
TROPONIN I SERPL DL<=0.01 NG/ML-MCNC: 0.05 NG/ML (ref 0–0.03)
TSH SERPL DL<=0.005 MIU/L-ACNC: 1.32 UIU/ML (ref 0.4–4)
URN SPEC COLLECT METH UR: ABNORMAL
WBC # BLD AUTO: 6.92 K/UL (ref 3.9–12.7)

## 2022-09-02 PROCEDURE — G0378 HOSPITAL OBSERVATION PER HR: HCPCS

## 2022-09-02 PROCEDURE — 85379 FIBRIN DEGRADATION QUANT: CPT | Performed by: STUDENT IN AN ORGANIZED HEALTH CARE EDUCATION/TRAINING PROGRAM

## 2022-09-02 PROCEDURE — 86803 HEPATITIS C AB TEST: CPT | Performed by: PHYSICIAN ASSISTANT

## 2022-09-02 PROCEDURE — 25000003 PHARM REV CODE 250

## 2022-09-02 PROCEDURE — 87389 HIV-1 AG W/HIV-1&-2 AB AG IA: CPT | Performed by: PHYSICIAN ASSISTANT

## 2022-09-02 PROCEDURE — 99220 PR INITIAL OBSERVATION CARE,LEVL III: CPT | Mod: ,,,

## 2022-09-02 PROCEDURE — 99285 EMERGENCY DEPT VISIT HI MDM: CPT | Mod: CS,,, | Performed by: EMERGENCY MEDICINE

## 2022-09-02 PROCEDURE — 83735 ASSAY OF MAGNESIUM: CPT | Performed by: STUDENT IN AN ORGANIZED HEALTH CARE EDUCATION/TRAINING PROGRAM

## 2022-09-02 PROCEDURE — 93010 ELECTROCARDIOGRAM REPORT: CPT | Mod: ,,, | Performed by: INTERNAL MEDICINE

## 2022-09-02 PROCEDURE — 80053 COMPREHEN METABOLIC PANEL: CPT | Performed by: STUDENT IN AN ORGANIZED HEALTH CARE EDUCATION/TRAINING PROGRAM

## 2022-09-02 PROCEDURE — 99285 EMERGENCY DEPT VISIT HI MDM: CPT | Mod: 25

## 2022-09-02 PROCEDURE — 99220 PR INITIAL OBSERVATION CARE,LEVL III: CPT | Mod: ,,, | Performed by: INTERNAL MEDICINE

## 2022-09-02 PROCEDURE — 81003 URINALYSIS AUTO W/O SCOPE: CPT | Mod: 59 | Performed by: STUDENT IN AN ORGANIZED HEALTH CARE EDUCATION/TRAINING PROGRAM

## 2022-09-02 PROCEDURE — 82962 GLUCOSE BLOOD TEST: CPT

## 2022-09-02 PROCEDURE — 85025 COMPLETE CBC W/AUTO DIFF WBC: CPT | Performed by: STUDENT IN AN ORGANIZED HEALTH CARE EDUCATION/TRAINING PROGRAM

## 2022-09-02 PROCEDURE — 99220 PR INITIAL OBSERVATION CARE,LEVL III: ICD-10-PCS | Mod: ,,, | Performed by: INTERNAL MEDICINE

## 2022-09-02 PROCEDURE — 25500020 PHARM REV CODE 255: Performed by: EMERGENCY MEDICINE

## 2022-09-02 PROCEDURE — 84443 ASSAY THYROID STIM HORMONE: CPT | Performed by: STUDENT IN AN ORGANIZED HEALTH CARE EDUCATION/TRAINING PROGRAM

## 2022-09-02 PROCEDURE — U0002 COVID-19 LAB TEST NON-CDC: HCPCS | Performed by: EMERGENCY MEDICINE

## 2022-09-02 PROCEDURE — 93005 ELECTROCARDIOGRAM TRACING: CPT

## 2022-09-02 PROCEDURE — 80307 DRUG TEST PRSMV CHEM ANLYZR: CPT | Performed by: STUDENT IN AN ORGANIZED HEALTH CARE EDUCATION/TRAINING PROGRAM

## 2022-09-02 PROCEDURE — 99285 PR EMERGENCY DEPT VISIT,LEVEL V: ICD-10-PCS | Mod: CS,,, | Performed by: EMERGENCY MEDICINE

## 2022-09-02 PROCEDURE — 93010 EKG 12-LEAD: ICD-10-PCS | Mod: ,,, | Performed by: INTERNAL MEDICINE

## 2022-09-02 PROCEDURE — 99220 PR INITIAL OBSERVATION CARE,LEVL III: ICD-10-PCS | Mod: ,,,

## 2022-09-02 PROCEDURE — 25000003 PHARM REV CODE 250: Performed by: STUDENT IN AN ORGANIZED HEALTH CARE EDUCATION/TRAINING PROGRAM

## 2022-09-02 PROCEDURE — 84484 ASSAY OF TROPONIN QUANT: CPT | Mod: 91 | Performed by: STUDENT IN AN ORGANIZED HEALTH CARE EDUCATION/TRAINING PROGRAM

## 2022-09-02 RX ORDER — BISACODYL 10 MG
10 SUPPOSITORY, RECTAL RECTAL DAILY PRN
Status: DISCONTINUED | OUTPATIENT
Start: 2022-09-02 | End: 2022-09-06 | Stop reason: HOSPADM

## 2022-09-02 RX ORDER — IBUPROFEN 200 MG
24 TABLET ORAL
Status: DISCONTINUED | OUTPATIENT
Start: 2022-09-02 | End: 2022-09-06 | Stop reason: HOSPADM

## 2022-09-02 RX ORDER — GLUCAGON 1 MG
1 KIT INJECTION
Status: DISCONTINUED | OUTPATIENT
Start: 2022-09-02 | End: 2022-09-06 | Stop reason: HOSPADM

## 2022-09-02 RX ORDER — ASPIRIN 325 MG
325 TABLET ORAL
Status: COMPLETED | OUTPATIENT
Start: 2022-09-02 | End: 2022-09-02

## 2022-09-02 RX ORDER — ONDANSETRON 8 MG/1
8 TABLET, ORALLY DISINTEGRATING ORAL EVERY 8 HOURS PRN
Status: DISCONTINUED | OUTPATIENT
Start: 2022-09-02 | End: 2022-09-06 | Stop reason: HOSPADM

## 2022-09-02 RX ORDER — TALC
6 POWDER (GRAM) TOPICAL NIGHTLY PRN
Status: DISCONTINUED | OUTPATIENT
Start: 2022-09-02 | End: 2022-09-06 | Stop reason: HOSPADM

## 2022-09-02 RX ORDER — NALOXONE HCL 0.4 MG/ML
0.02 VIAL (ML) INJECTION
Status: DISCONTINUED | OUTPATIENT
Start: 2022-09-02 | End: 2022-09-06 | Stop reason: HOSPADM

## 2022-09-02 RX ORDER — MAG HYDROX/ALUMINUM HYD/SIMETH 200-200-20
30 SUSPENSION, ORAL (FINAL DOSE FORM) ORAL 4 TIMES DAILY PRN
Status: DISCONTINUED | OUTPATIENT
Start: 2022-09-02 | End: 2022-09-06 | Stop reason: HOSPADM

## 2022-09-02 RX ORDER — SODIUM CHLORIDE 0.9 % (FLUSH) 0.9 %
5 SYRINGE (ML) INJECTION
Status: DISCONTINUED | OUTPATIENT
Start: 2022-09-02 | End: 2022-09-06 | Stop reason: HOSPADM

## 2022-09-02 RX ORDER — SIMETHICONE 80 MG
1 TABLET,CHEWABLE ORAL 4 TIMES DAILY PRN
Status: DISCONTINUED | OUTPATIENT
Start: 2022-09-02 | End: 2022-09-06 | Stop reason: HOSPADM

## 2022-09-02 RX ORDER — ACETAMINOPHEN 500 MG
1000 TABLET ORAL EVERY 8 HOURS PRN
Status: DISCONTINUED | OUTPATIENT
Start: 2022-09-02 | End: 2022-09-06 | Stop reason: HOSPADM

## 2022-09-02 RX ORDER — IBUPROFEN 200 MG
16 TABLET ORAL
Status: DISCONTINUED | OUTPATIENT
Start: 2022-09-02 | End: 2022-09-06 | Stop reason: HOSPADM

## 2022-09-02 RX ORDER — PROCHLORPERAZINE EDISYLATE 5 MG/ML
5 INJECTION INTRAMUSCULAR; INTRAVENOUS EVERY 6 HOURS PRN
Status: DISCONTINUED | OUTPATIENT
Start: 2022-09-02 | End: 2022-09-06 | Stop reason: HOSPADM

## 2022-09-02 RX ORDER — IPRATROPIUM BROMIDE AND ALBUTEROL SULFATE 2.5; .5 MG/3ML; MG/3ML
3 SOLUTION RESPIRATORY (INHALATION) EVERY 4 HOURS PRN
Status: DISCONTINUED | OUTPATIENT
Start: 2022-09-02 | End: 2022-09-06 | Stop reason: HOSPADM

## 2022-09-02 RX ORDER — AMLODIPINE BESYLATE 10 MG/1
10 TABLET ORAL
Status: DISCONTINUED | OUTPATIENT
Start: 2022-09-02 | End: 2022-09-02

## 2022-09-02 RX ORDER — ACETAMINOPHEN 325 MG/1
650 TABLET ORAL EVERY 4 HOURS PRN
Status: DISCONTINUED | OUTPATIENT
Start: 2022-09-02 | End: 2022-09-06 | Stop reason: HOSPADM

## 2022-09-02 RX ORDER — HYDRALAZINE HYDROCHLORIDE 20 MG/ML
10 INJECTION INTRAMUSCULAR; INTRAVENOUS EVERY 8 HOURS PRN
Status: DISCONTINUED | OUTPATIENT
Start: 2022-09-02 | End: 2022-09-06 | Stop reason: HOSPADM

## 2022-09-02 RX ORDER — AMLODIPINE BESYLATE 10 MG/1
10 TABLET ORAL DAILY
Status: DISCONTINUED | OUTPATIENT
Start: 2022-09-02 | End: 2022-09-04

## 2022-09-02 RX ADMIN — IOHEXOL 100 ML: 350 INJECTION, SOLUTION INTRAVENOUS at 08:09

## 2022-09-02 RX ADMIN — ASPIRIN 325 MG ORAL TABLET 325 MG: 325 PILL ORAL at 09:09

## 2022-09-02 RX ADMIN — AMLODIPINE BESYLATE 10 MG: 10 TABLET ORAL at 02:09

## 2022-09-02 RX ADMIN — ACETAMINOPHEN 650 MG: 325 TABLET ORAL at 08:09

## 2022-09-02 NOTE — H&P
[facility] - Emergency Dept  [facility]  History & Physical    Patient Name: Reji Pond Jr.  MRN: [id number]  Patient Class: OP- Observation  Admission Date: 2022  Attending Physician: Stevenson Girard MD   Primary Care Provider: Deangelo Rasheed MD         Patient information was obtained from patient, past medical records and ER records.     Subjective:     Principal Problem:Syncope    Chief Complaint:   Chief Complaint   Patient presents with    Loss of Consciousness     Arrived via POV with c/o LOC , +head injury, spouse state he was in the bathroom, she heard a bang and he was shaking when she walked in, no hx of seizures          HPI: Reji Pond Jr. is a 36 y.o. male with a past medical history of HTN who presented to the ED this morning s/p LOC at home. Patient states that he woke up at 4 AM to get ready for work, was urinating and then woke up on the floor. His wife told him that she heard a loud sound, came into the bathroom and found him shaking and unconscious. Patient states that he hit is abdomen on the sink next to the toilet and hit his head on a carpeted surface. He now is experiencing head pressure and left sided abdominal pain. Patient denies any prodromal symptoms prior to losing consciousness. After regaining consciousness, he denied any changes in mental status. Denies hx of seizures. He stated he watched football last night and went to bed shortly after, denied any alcohol or drug consumption. Pt denies any chest pain or shortness of breath.       Patient states he takes amlodipine for his HTN regularly, unsure of compliance as recent rx is . Has known liver hemangioma. He works a strenuous job with long hours and has multiple children and endorses recent stress. Family history includes younger brother with seizures, biological father  from heart attack, maternal grandfather  from heart attack, and two children with unspecified heart murmurs.     ED:  Hypertensive up to 210/114, all other VSS, afebrile. Troponin 1: 0.051, troponin 2: 0.049. D-dimer 0.81. UA negative. UDS negative. CTH: No acute intracranial hemorrhage/injury. CTA: No acute abnormality of the chest, specifically, no evidence of pulmonary thromboembolism. X-ray Ribs: No displaced left-sided rib fracture or associated complication identified in the chest. Given a dose of 325 mg ASA and placed in observation.      Past Medical History:   Diagnosis Date    Elevated liver function tests 10/25/2018    Fatty liver 10/25/2018    Hypertension     Kidney stones        Past Surgical History:   Procedure Laterality Date    CHONDROPLASTY OF KNEE Left 7/28/2020    Procedure: CHONDROPLASTY, KNEE;  Surgeon: Santo Shepherd MD;  Location: [facility] OR;  Service: Orthopedics;  Laterality: Left;    KNEE ARTHROSCOPY W/ MENISCECTOMY Left 7/28/2020    Procedure: ARTHROSCOPY, KNEE, WITH MENISCECTOMY;  Surgeon: Santo Shepherd MD;  Location: [facility] OR;  Service: Orthopedics;  Laterality: Left;    KNEE ARTHROSCOPY W/ OATS PROCEDURE Left 7/28/2020    Procedure: REPAIR, KNEE, ARTHROSCOPIC, WITH OSTEOCHONDRAL GRAFT TRANSFER;  Surgeon: Santo Shepherd MD;  Location: [facility] OR;  Service: Orthopedics;  Laterality: Left;  Regional W/ Catheter: Epidural, Spinal, Adductor  TRACI 50cc        Review of patient's allergies indicates:  No Known Allergies    No current facility-administered medications on file prior to encounter.     Current Outpatient Medications on File Prior to Encounter   Medication Sig    amLODIPine (NORVASC) 10 MG tablet Take 1 tablet (10 mg total) by mouth once daily.    benzocaine (HURRICAINE) 20 % Gel Use as directed 1 application in the mouth or throat 4 (four) times daily as needed.    ibuprofen (ADVIL,MOTRIN) 600 MG tablet Take 1 tablet (600 mg total) by mouth every 6 (six) hours as needed for Pain.    meloxicam (MOBIC) 15 MG tablet Take 1 tablet (15 mg total) by mouth once daily.     Family History        Problem Relation (Age of Onset)    Heart failure Father          Tobacco Use    Smoking status: Former    Smokeless tobacco: Never   Substance and Sexual Activity    Alcohol use: Yes     Comment: occasionally     Drug use: No    Sexual activity: Yes     Partners: Female     Review of Systems   Constitutional:  Negative for diaphoresis and fever.   HENT:  Negative for facial swelling and sore throat.    Respiratory:  Negative for cough, chest tightness and shortness of breath.    Cardiovascular:  Negative for chest pain, palpitations and leg swelling.   Gastrointestinal:  Positive for abdominal pain (L sided). Negative for nausea.   Genitourinary:  Negative for decreased urine volume and difficulty urinating.   Musculoskeletal:  Negative for back pain and neck pain.   Skin:  Negative for pallor and wound.   Neurological:  Positive for syncope and headaches. Negative for dizziness, speech difficulty and weakness.   Psychiatric/Behavioral:  Negative for confusion and decreased concentration.    Objective:     Vital Signs (Most Recent):  Temp: 98.2 °F (36.8 °C) (09/02/22 0702)  Pulse: 70 (09/02/22 1322)  Resp: 20 (09/02/22 1030)  BP: (!) 173/95 (09/02/22 1030)  SpO2: 95 % (09/02/22 1030)   Vital Signs (24h Range):  Temp:  [98.2 °F (36.8 °C)] 98.2 °F (36.8 °C)  Pulse:  [48-75] 70  Resp:  [18-24] 20  SpO2:  [95 %-99 %] 95 %  BP: (138-210)/() 173/95     Weight: 104.3 kg (230 lb)  Body mass index is 31.19 kg/m².    Physical Exam  Constitutional:       Appearance: Normal appearance.   HENT:      Head: Normocephalic and atraumatic.      Nose: Nose normal.   Eyes:      Extraocular Movements: Extraocular movements intact.   Cardiovascular:      Rate and Rhythm: Normal rate and regular rhythm.      Heart sounds: Murmur heard.   Pulmonary:      Effort: Pulmonary effort is normal.      Breath sounds: Normal breath sounds.   Abdominal:      General: Abdomen is flat. Bowel sounds are normal.      Palpations: Abdomen is  soft.   Musculoskeletal:         General: Normal range of motion.      Cervical back: Normal range of motion.   Skin:     General: Skin is warm.   Neurological:      General: No focal deficit present.      Mental Status: He is alert and oriented to person, place, and time.   Psychiatric:         Mood and Affect: Mood normal.         Behavior: Behavior normal.           Significant Labs: All pertinent labs within the past 24 hours have been reviewed.  CBC:   Recent Labs   Lab 09/02/22  0728   WBC 6.92   HGB 15.6   HCT 46.1        CMP:   Recent Labs   Lab 09/02/22  0728      K 3.4*      CO2 24      BUN 14   CREATININE 0.9   CALCIUM 9.3   PROT 7.8   ALBUMIN 4.0   BILITOT 0.2   ALKPHOS 114   AST 18   ALT 23   ANIONGAP 7*     Magnesium:   Recent Labs   Lab 09/02/22  0728   MG 1.8     Troponin:   Recent Labs   Lab 09/02/22  0728 09/02/22  1033   TROPONINI 0.051* 0.049*     TSH:   Recent Labs   Lab 09/02/22  0728   TSH 1.319     Urine Studies:   Recent Labs   Lab 09/02/22  0731   COLORU Colorless*   APPEARANCEUA Clear   PHUR 7.0   SPECGRAV 1.015   PROTEINUA Negative   GLUCUA Negative   KETONESU Negative   BILIRUBINUA Negative   OCCULTUA Trace*   NITRITE Negative   LEUKOCYTESUR Negative       Significant Imaging: I have reviewed all pertinent imaging results/findings within the past 24 hours.    Assessment/Plan:     * Syncope  - interval history and physical exam findings as described above  - hypertensive upon arrival to the ED, otherwise VSSAF  - denies CP, SOB, dizziness  - CTA without evidence of PE  - CTH without acute findings  - no previous episodes, currently asymptomatic  - no previous EKG on file, EKG in the ED with Normal sinus rhythm and ST and T wave abnormality  - orthostatic vitals ordered, pending  - echo ordered and is within normal limits, see results below  - lipid panel, A1c in the am  - etiology unknown at this time, concern for cardiac etiology  - consider EEG if further  work-up is unremarkable  - consulted cardiology due to elevated troponins in setting of syncopal episode  - will continue to monitor on tele    Results for orders placed during the hospital encounter of 22    Echo    Interpretation Summary  · The left ventricle is normal in size with normal systolic function. The estimated ejection fraction is 65%.  · Normal right ventricular size with normal right ventricular systolic function.  · Normal left ventricular diastolic function.  · Normal central venous pressure (3 mmHg).    HTN (hypertension)  - BP currently not well-controlled  - patient reports that the often takes his BP medications, but amlodipine rx has been  in 2022 - unsure of compliance  - resume home regimen of amlodipine 10 mg daily  - will continue to monitor and further titrate antihypertensives as clinically indicated       VTE Risk Mitigation (From admission, onward)         Ordered     IP VTE LOW RISK PATIENT  Once         22 2004                   Daja Mann PA-C  Department of Hospital Medicine   [facility] - Emergency Dept

## 2022-09-02 NOTE — ED PROVIDER NOTES
Encounter Date: 9/2/2022       History     Chief Complaint   Patient presents with    Loss of Consciousness     Arrived via POV with c/o LOC , +head injury, spouse state he was in the bathroom, she heard a bang and he was shaking when she walked in, no hx of seizures       36-year-old male with history of hypertension presenting to the ED after an episode of loss of consciousness.  He was going to the bathroom at approximately 4:00 a.m., and subsequently lost consciousness.  Lasted approximately 5 minutes per witness.  Per witness, had shaking of upper and lower extremities.  Denies any tongue biting, urinary incontinence, fecal incontinence.  Positive head trauma.  He fell into a door.  Denies any numbness or weakness in his arms or legs.  Denies any symptoms now:  Chest pain, shortness breath, lightheadedness or dizziness, numbness or weakness in his arms or legs, headache, blurry vision, nausea, vomiting.    The history is provided by the patient and medical records. No  was used.   Review of patient's allergies indicates:  No Known Allergies  Past Medical History:   Diagnosis Date    Elevated liver function tests 10/25/2018    Fatty liver 10/25/2018    Hypertension     Kidney stones      Past Surgical History:   Procedure Laterality Date    CHONDROPLASTY OF KNEE Left 7/28/2020    Procedure: CHONDROPLASTY, KNEE;  Surgeon: Santo Shepherd MD;  Location: [facility] OR;  Service: Orthopedics;  Laterality: Left;    KNEE ARTHROSCOPY W/ MENISCECTOMY Left 7/28/2020    Procedure: ARTHROSCOPY, KNEE, WITH MENISCECTOMY;  Surgeon: Santo Shepherd MD;  Location: [facility] OR;  Service: Orthopedics;  Laterality: Left;    KNEE ARTHROSCOPY W/ OATS PROCEDURE Left 7/28/2020    Procedure: REPAIR, KNEE, ARTHROSCOPIC, WITH OSTEOCHONDRAL GRAFT TRANSFER;  Surgeon: Santo Shepherd MD;  Location: [facility] OR;  Service: Orthopedics;  Laterality: Left;  Regional W/ Catheter: Epidural, Spinal, Adductor  TRACI 50cc      Family History    Problem Relation Age of Onset    Heart failure Father      Social History     Tobacco Use    Smoking status: Former    Smokeless tobacco: Never   Substance Use Topics    Alcohol use: Yes     Comment: occasionally     Drug use: No     Review of Systems   Constitutional:  Negative for fever.   HENT:  Negative for sore throat.    Respiratory:  Negative for shortness of breath.    Cardiovascular:  Negative for chest pain.   Gastrointestinal:  Negative for abdominal pain, diarrhea, nausea and vomiting.   Genitourinary:  Negative for dysuria.   Musculoskeletal:  Negative for back pain.   Skin:  Negative for rash.   Neurological:  Positive for syncope. Negative for dizziness, weakness and headaches.   Hematological:  Does not bruise/bleed easily.     Physical Exam     Initial Vitals [09/02/22 0702]   BP Pulse Resp Temp SpO2   (!) 198/98 75 18 98.2 °F (36.8 °C) 98 %      MAP       --         Physical Exam    Nursing note and vitals reviewed.  Constitutional: Vital signs are normal. He appears well-developed and well-nourished. He is not diaphoretic. He does not appear ill. No distress.   HENT:   Head: Normocephalic and atraumatic.   Mouth/Throat: Oropharynx is clear and moist.   Eyes: Conjunctivae and EOM are normal. Right conjunctiva is not injected. Left conjunctiva is not injected.   Neck: No tracheal deviation present.   No midline C-spine tenderness to palpation   Normal range of motion.  Cardiovascular:  Normal rate, regular rhythm, S1 normal and S2 normal.     Exam reveals no gallop and no friction rub.       No murmur heard.  Pulmonary/Chest: No respiratory distress. He has no wheezes. He has no rhonchi. He has no rales. He exhibits no tenderness.   Abdominal: Abdomen is soft. He exhibits no distension and no mass. There is no abdominal tenderness. There is no rebound and no guarding.   Musculoskeletal:         General: No edema.      Cervical back: Normal range of motion.     Neurological: He is alert and  oriented to person, place, and time. He has normal strength. No cranial nerve deficit or sensory deficit.   Skin: Skin is warm and dry. No rash noted. No erythema. No pallor.   Psychiatric: He has a normal mood and affect. Thought content normal.       ED Course   Procedures  Labs Reviewed   CBC W/ AUTO DIFFERENTIAL - Abnormal; Notable for the following components:       Result Value    MCV 79 (*)     MCH 26.9 (*)     Immature Granulocytes 0.6 (*)     All other components within normal limits   COMPREHENSIVE METABOLIC PANEL - Abnormal; Notable for the following components:    Potassium 3.4 (*)     Anion Gap 7 (*)     All other components within normal limits   TROPONIN I - Abnormal; Notable for the following components:    Troponin I 0.051 (*)     All other components within normal limits   URINALYSIS, REFLEX TO URINE CULTURE - Abnormal; Notable for the following components:    Color, UA Colorless (*)     Occult Blood UA Trace (*)     All other components within normal limits    Narrative:     Specimen Source->Urine   D DIMER, QUANTITATIVE - Abnormal; Notable for the following components:    D-Dimer 0.81 (*)     All other components within normal limits   TROPONIN I - Abnormal; Notable for the following components:    Troponin I 0.049 (*)     All other components within normal limits   HIV 1 / 2 ANTIBODY    Narrative:     Release to patient->Immediate   HEPATITIS C ANTIBODY    Narrative:     Release to patient->Immediate   MAGNESIUM   TSH   SARS-COV-2 RNA AMPLIFICATION, QUAL   DRUG SCREEN PANEL, URINE EMERGENCY    Narrative:     Specimen Source->Urine   POCT GLUCOSE   POCT GLUCOSE MONITORING CONTINUOUS     EKG Readings: (Independently Interpreted)   0710:  NS,  rate 65, no STEMI, ST depressions/TWI inferior, V4-V5     0719: Normal sinus rhythm, rate of 70. ST depressions noted in inferior leads, VV4-V6. T wave inversions noted in inferior leads. No STEMI per my independent interpretation. No dynamic changes   No  prior EKG for comparison    ECG Results              Repeat EKG 12-lead (Final result)  Result time 09/02/22 08:14:56      Final result by Interface, Lab In [facility] (09/02/22 08:14:56)                   Narrative:    Test Reason : R55,    Vent. Rate : 070 BPM     Atrial Rate : 070 BPM     P-R Int : 164 ms          QRS Dur : 106 ms      QT Int : 368 ms       P-R-T Axes : 042 086 -30 degrees     QTc Int : 397 ms    Normal sinus rhythm  ST and T wave abnormality, consider inferolateral ischemia  Abnormal ECG  When compared with ECG of 02-SEP-2022 07:10,  No significant change was found  Confirmed by Romero ABBASI MD ([id number]) on 9/2/2022 8:14:47 AM    Referred By: [email or web address]   SELF           Confirmed By:Romero ABBASI MD                                  Imaging Results              CTA Chest Non-Coronary (PE Study) (Final result)  Result time 09/02/22 09:05:18      Final result by Brodie Pinon MD (09/02/22 09:05:18)                   Impression:      No acute abnormality of the chest. Specifically, no evidence of pulmonary thromboembolism.    Apparent interval increase in size of large hemangioma in the posterior right hepatic lobe.      Electronically signed by: Brodie Pinon MD  Date:    09/02/2022  Time:    09:05               Narrative:    EXAMINATION:  CTA CHEST NON CORONARY    CLINICAL HISTORY:  Pulmonary embolism (PE) suspected, positive D-dimer;    TECHNIQUE:  Low dose axial images, sagittal and coronal reformations were obtained from the thoracic inlet to the lung bases following the IV administration of 100 mL of Omnipaque 350.  Contrast timing was optimized to evaluate the pulmonary arteries.  MIP images were performed.    COMPARISON:  Chest radiograph, 09/02/2022.  CT abdomen, 09/10/2018.    FINDINGS:  Examination of the soft tissue and vascular structures at the base of the neck is unremarkable.    The thoracic aorta maintains normal caliber, contour, and course without significant atherosclerotic  calcification.  There is no evidence of aneurysmal dilation or dissection.    The pulmonary arteries distribute normally without evidence of filling defect to indicate pulmonary thromboembolism.    The trachea and proximal airways are patent.    The lungs are symmetrically expanded and without evidence of consolidation, pneumothorax, mass, or significant pleural thickening.  No evidence of pleural fluid.    The heart is not enlarged.  No pericardial effusion.    There is no axillary, mediastinal, or hilar lymph node enlargement.    There is a small hiatal hernia and patulous appearance of the lower esophagus.    Limited images of the upper abdomen obtained during the course of this dedicated thoracic CT is negative for acute findings.  There is a large hypoattenuating lesion in the posterior right hepatic lobe with areas of internal enhancement, most likely a hemangioma as seen on prior CT in 2018.  This finding is not well delineated on this dedicated thoracic CT but appears to measure up to 9 cm in size.    The osseous structures are negative for acute finding or aggressive osseous lesion.                                       XR Ribs Min 3 views w/PA Chest Left (Final result)  Result time 09/02/22 08:42:51   Procedure changed from X-Ray Chest AP Portable     Final result by Brodie Pinon MD (09/02/22 08:42:51)                   Impression:      No displaced left-sided rib fracture or associated complication identified in the chest.      Electronically signed by: Brodie Pinon MD  Date:    09/02/2022  Time:    08:42               Narrative:    EXAMINATION:  XR RIBS MIN 3 VIEWS W/ PA CHEST LEFT    CLINICAL HISTORY:  fall;    TECHNIQUE:  One view of the chest and five views of the left ribs.    COMPARISON:  Chest radiograph, 05/05/2015.    FINDINGS:  Cardiac wires overlie the chest.  Cardiac silhouette is not enlarged.  No focal consolidation.  No sizable pleural effusion.  No pneumothorax.  No displaced  left-sided rib fracture.  No subcutaneous emphysema in the chest wall.                                       CT Head Without Contrast (Final result)  Result time 09/02/22 08:07:21      Final result by Marcin Suarez MD (09/02/22 08:07:21)                   Impression:      No acute intracranial hemorrhage/injury.      Electronically signed by: Marcin Suarez  Date:    09/02/2022  Time:    08:07               Narrative:    EXAMINATION:  CT HEAD WITHOUT CONTRAST    CLINICAL HISTORY:  Polytrauma, blunt;    TECHNIQUE:  Low dose axial images were obtained through the head.  Coronal and sagittal reformations were also performed. Contrast was not administered.    COMPARISON:  None.    FINDINGS:  There is no evidence of hydrocephalus mass effect intracranial hemorrhage or parenchymal contusion.  The parenchyma maintains normal attenuation.    No calvarial fracture.  Visualized sinuses and mastoid air cells are clear other than for a small left maxillary retention cyst.                                    X-Rays:   Independently Interpreted Readings:   Other Readings:  No pneumonia, pneumothorax, or pleural effusion noted on CXR      Medications   sodium chloride 0.9% flush 5 mL (has no administration in time range)   albuterol-ipratropium 2.5 mg-0.5 mg/3 mL nebulizer solution 3 mL (has no administration in time range)   melatonin tablet 6 mg (has no administration in time range)   ondansetron disintegrating tablet 8 mg (has no administration in time range)   prochlorperazine injection Soln 5 mg (has no administration in time range)   bisacodyL suppository 10 mg (has no administration in time range)   simethicone chewable tablet 80 mg (has no administration in time range)   aluminum-magnesium hydroxide-simethicone 200-200-20 mg/5 mL suspension 30 mL (has no administration in time range)   acetaminophen tablet 650 mg (has no administration in time range)   acetaminophen tablet 1,000 mg (has no administration in time range)    naloxone 0.4 mg/mL injection 0.02 mg (has no administration in time range)   glucose chewable tablet 16 g (has no administration in time range)   glucose chewable tablet 24 g (has no administration in time range)   glucagon (human recombinant) injection 1 mg (has no administration in time range)   dextrose 10% bolus 125 mL (has no administration in time range)   dextrose 10% bolus 250 mL (has no administration in time range)   amLODIPine tablet 10 mg (10 mg Oral Given 9/2/22 1447)   iohexoL (OMNIPAQUE 350) injection 100 mL (100 mLs Intravenous Given 9/2/22 0835)   aspirin tablet 325 mg (325 mg Oral Given 9/2/22 0922)     Medical Decision Making:   History:   Old Medical Records: I decided to obtain old medical records.  Initial Assessment:   36-year-old male presenting to the ED with syncopal episode after urinating.  No other prodrome.  No tongue biting or urinary incontinence.  Now back to baseline.  Has no neurologic deficits, nontender abdomen, mild tenderness palpation of left sided ribs with equal bilateral breath sounds.      Differential includes was not limited to arrhythmia, ACS, pneumonia, electrolyte abnormalities, hypoglycemia.    EKG concerning for ST depressions in multiple leads.  Patient has no chest pain or chest pain equivalent.  CBC without leukocytosis or anemia.  UA showed no concern for UTI.  D-dimer mildly elevated 0.81, will obtain CT PE study.  Troponin elevated at 0.051.  Will trend.  CT PE study showed no concern for PE.  CT head showed no concern for intracranial bleed or mass.  Discussed with Hospital Medicine for admission.    Independently Interpreted Test(s):   I have ordered and independently interpreted X-rays - see prior notes.  I have ordered and independently interpreted EKG Reading(s) - see prior notes  Clinical Tests:   Lab Tests: Ordered and Reviewed  Radiological Study: Ordered and Reviewed  Medical Tests: Ordered and Reviewed  Other:   I have discussed this case with  another health care provider.       <> Summary of the Discussion: Hospital medicine          Attending Attestation:   Physician Attestation Statement for Resident:  As the supervising MD   Physician Attestation Statement: I have personally seen and examined this patient.   I agree with the above history.  -: 36-year-old male presenting after an episode of loss of consciousness after urinating.  No preceding chest pain or shortness of breath.  Believes he hit his head on the door and also has left rib pain.   Report of shaking by significant other.  No prior history of seizure.  Did not bite his tongue or experience incontinence.   As the supervising MD I agree with the above PE.   -: No evidence of head injury, no C-spine tenderness  Left lateral ribs tender  Nontender abdomen  Full strength and sensation all extremities  RRR, lungs clear   No T/L spinal TTP   As the supervising MD I agree with the above treatment, course, plan, and disposition.   -: EKG with ST-T wave abnormalities as above - no prior for comparison.  CTNI noted - will need to trend, no chest pain.  No PE on CTA.  Syncope vs seizure - agree with admission to Hospital Medicine for further management.    I have reviewed and agree with the residents interpretation of the following: lab data, x-rays, EKG and CT scans.  I have reviewed the following: old records at this facility.              ED Course as of 09/02/22 1700   Fri Sep 02, 2022   0755 WBC: 6.92  No leukocytosis  [AB]   0755 Hemoglobin: 15.6  No anemia  [AB]   0755 Leukocytes, UA: Negative [AB]   0755 NITRITE UA: Negative  No UTI [AB]   0811 D-Dimer(!): 0.81  Will obtain CTA PE [AU]   0857 Troponin I(!): 0.051 [AU]   0910 Troponin I(!): 0.051 [AB]      ED Course User Index  [AB] Chris Colon MD  [AU] Domingo Johnson MD               Clinical Impression:   Final diagnoses:  [R55] Syncope (Primary)  [R07.81] Rib pain  [I10] Hypertension, unspecified type  [R77.8] Elevated troponin         ED Disposition Condition    Observation                 Domingo Johnson MD  Resident  09/02/22 1623       Chris Colon MD  09/02/22 0207

## 2022-09-02 NOTE — SUBJECTIVE & OBJECTIVE
Past Medical History:   Diagnosis Date    Elevated liver function tests 10/25/2018    Fatty liver 10/25/2018    Hypertension     Kidney stones        Past Surgical History:   Procedure Laterality Date    CHONDROPLASTY OF KNEE Left 7/28/2020    Procedure: CHONDROPLASTY, KNEE;  Surgeon: Santo Shepherd MD;  Location: [facility] OR;  Service: Orthopedics;  Laterality: Left;    KNEE ARTHROSCOPY W/ MENISCECTOMY Left 7/28/2020    Procedure: ARTHROSCOPY, KNEE, WITH MENISCECTOMY;  Surgeon: Santo Shepherd MD;  Location: [facility] OR;  Service: Orthopedics;  Laterality: Left;    KNEE ARTHROSCOPY W/ OATS PROCEDURE Left 7/28/2020    Procedure: REPAIR, KNEE, ARTHROSCOPIC, WITH OSTEOCHONDRAL GRAFT TRANSFER;  Surgeon: Santo Shepherd MD;  Location: [facility] OR;  Service: Orthopedics;  Laterality: Left;  Regional W/ Catheter: Epidural, Spinal, Adductor  TRACI 50cc        Review of patient's allergies indicates:  No Known Allergies    No current facility-administered medications on file prior to encounter.     Current Outpatient Medications on File Prior to Encounter   Medication Sig    amLODIPine (NORVASC) 10 MG tablet Take 1 tablet (10 mg total) by mouth once daily.    benzocaine (HURRICAINE) 20 % Gel Use as directed 1 application in the mouth or throat 4 (four) times daily as needed.    ibuprofen (ADVIL,MOTRIN) 600 MG tablet Take 1 tablet (600 mg total) by mouth every 6 (six) hours as needed for Pain.    meloxicam (MOBIC) 15 MG tablet Take 1 tablet (15 mg total) by mouth once daily.     Family History       Problem Relation (Age of Onset)    Heart failure Father          Tobacco Use    Smoking status: Former    Smokeless tobacco: Never   Substance and Sexual Activity    Alcohol use: Yes     Comment: occasionally     Drug use: No    Sexual activity: Yes     Partners: Female     Review of Systems   Constitutional: Negative for fever and malaise/fatigue.   HENT:  Negative for congestion and sore throat.    Eyes:  Negative for blurred vision,  vision loss in left eye and vision loss in right eye.   Cardiovascular:  Negative for chest pain, dyspnea on exertion, irregular heartbeat, leg swelling, near-syncope, palpitations and syncope.   Respiratory:  Negative for shortness of breath and wheezing.    Endocrine: Negative.    Hematologic/Lymphatic: Negative.    Skin: Negative.    Musculoskeletal:  Negative for arthritis, back pain, joint pain and myalgias.   Gastrointestinal:  Negative for abdominal pain, hematemesis, hematochezia and melena.   Genitourinary: Negative.    Neurological:  Positive for light-headedness. Negative for loss of balance.   Psychiatric/Behavioral: Negative.     Objective:     Vital Signs (Most Recent):  Temp: 98.5 °F (36.9 °C) (09/02/22 1543)  Pulse: 67 (09/02/22 1543)  Resp: 18 (09/02/22 1543)  BP: (!) 172/92 (09/02/22 1543)  SpO2: 98 % (09/02/22 1543)   Vital Signs (24h Range):  Temp:  [98.2 °F (36.8 °C)-98.5 °F (36.9 °C)] 98.5 °F (36.9 °C)  Pulse:  [48-75] 67  Resp:  [18-24] 18  SpO2:  [95 %-99 %] 98 %  BP: (138-210)/() 172/92     Weight: 104.3 kg (230 lb)  Body mass index is 31.19 kg/m².    SpO2: 98 %  O2 Device (Oxygen Therapy): room air    No intake or output data in the 24 hours ending 09/02/22 1628    Lines/Drains/Airways       Epidural Line  Duration                  Perineural Analgesia/Anesthesia Assessment (using dermatomes) 07/28/20 1137 766 days              Peripheral Intravenous Line  Duration                  Peripheral IV - Single Lumen 09/02/22 0719 20 G Left Antecubital <1 day                    Physical Exam  Vitals and nursing note reviewed.   Constitutional:       Appearance: Normal appearance. He is normal weight. He is not toxic-appearing.   HENT:      Head: Normocephalic and atraumatic.   Eyes:      General: No scleral icterus.     Extraocular Movements: Extraocular movements intact.   Neck:      Vascular: No carotid bruit.   Cardiovascular:      Rate and Rhythm: Normal rate and regular rhythm.       "Pulses: Normal pulses.      Heart sounds: Normal heart sounds. No murmur heard.    No gallop.   Pulmonary:      Effort: Pulmonary effort is normal. No respiratory distress.      Breath sounds: Normal breath sounds. No wheezing or rales.   Abdominal:      General: Abdomen is flat. Bowel sounds are normal.      Palpations: Abdomen is soft. There is no mass.   Musculoskeletal:      Cervical back: Normal range of motion.      Right lower leg: No edema.      Left lower leg: No edema.   Skin:     General: Skin is warm and dry.      Capillary Refill: Capillary refill takes less than 2 seconds.      Findings: No rash.   Neurological:      General: No focal deficit present.      Mental Status: He is alert and oriented to person, place, and time. Mental status is at baseline.       Significant Labs: All pertinent lab results from the last 24 hours have been reviewed.    Significant Imaging: Echocardiogram: Transthoracic echo (TTE) complete (Cupid Only):   Results for orders placed or performed during the hospital encounter of 09/02/22   Echo   Result Value Ref Range    Ascending aorta 2.78 cm    STJ 2.36 cm    AV mean gradient 4 mmHg    Ao peak blair 1.48 m/s    Ao VTI 26.83 cm    IVS 0.95 0.6 - 1.1 cm    LA size 4.05 cm    Left Atrium Major Axis 5.11 cm    Left Atrium Minor Axis 5.10 cm    LVIDd 5.12 3.5 - 6.0 cm    LVIDs 3.38 2.1 - 4.0 cm    LVOT diameter 2.01 cm    LVOT peak VTI 18.72 cm    Posterior Wall 1.00 0.6 - 1.1 cm    MV Peak A Blair 0.76 m/s    E wave deceleration time 206.72 msec    MV Peak E Blair 0.94 m/s    PV Peak D Blair 0.57 m/s    PV Peak S Blair 0.44 m/s    RA Major Axis 4.50 cm    RA Width 3.39 cm    RVDD 3.48 cm    Sinus 2.61 cm    TAPSE 1.88 cm    TDI LATERAL 0.10 m/s    TDI SEPTAL 0.08 m/s    LA WIDTH 4.31 cm    MV stenosis pressure 1/2 time 59.95 ms    LV Diastolic Volume 124.80 mL    LV Systolic Volume 46.89 mL    RV S' 10.73 cm/s    LVOT peak blair 0.87 m/s    LA volume (mod) 54.81 cm3    MV "A" wave duration " 10.85 msec    LV LATERAL E/E' RATIO 9.40 m/s    LV SEPTAL E/E' RATIO 11.75 m/s    FS 34 %    LA volume 75.74 cm3    LV mass 182.95 g    Left Ventricle Relative Wall Thickness 0.39 cm    AV valve area 2.21 cm2    AV Velocity Ratio 0.59     AV index (prosthetic) 0.70     MV valve area p 1/2 method 3.67 cm2    E/A ratio 1.24     Mean e' 0.09 m/s    Pulm vein S/D ratio 0.77     LVOT area 3.2 cm2    LVOT stroke volume 59.37 cm3    AV peak gradient 9 mmHg    E/E' ratio 10.44 m/s    LV Systolic Volume Index 20.7 mL/m2    LV Diastolic Volume Index 55.22 mL/m2    LA Volume Index 33.5 mL/m2    LV Mass Index 81 g/m2    LA Volume Index (Mod) 24.3 mL/m2    BSA 2.3 m2    Right Atrial Pressure (from IVC) 3 mmHg    EF 65 %    Narrative    · The left ventricle is normal in size with normal systolic function. The   estimated ejection fraction is 65%.  · Normal right ventricular size with normal right ventricular systolic   function.  · Normal left ventricular diastolic function.  · Normal central venous pressure (3 mmHg).

## 2022-09-02 NOTE — SUBJECTIVE & OBJECTIVE
Past Medical History:   Diagnosis Date    Elevated liver function tests 10/25/2018    Fatty liver 10/25/2018    Hypertension     Kidney stones        Past Surgical History:   Procedure Laterality Date    CHONDROPLASTY OF KNEE Left 7/28/2020    Procedure: CHONDROPLASTY, KNEE;  Surgeon: Santo Shepherd MD;  Location: [facility] OR;  Service: Orthopedics;  Laterality: Left;    KNEE ARTHROSCOPY W/ MENISCECTOMY Left 7/28/2020    Procedure: ARTHROSCOPY, KNEE, WITH MENISCECTOMY;  Surgeon: Santo Shepherd MD;  Location: [facility] OR;  Service: Orthopedics;  Laterality: Left;    KNEE ARTHROSCOPY W/ OATS PROCEDURE Left 7/28/2020    Procedure: REPAIR, KNEE, ARTHROSCOPIC, WITH OSTEOCHONDRAL GRAFT TRANSFER;  Surgeon: Santo Shepherd MD;  Location: [facility] OR;  Service: Orthopedics;  Laterality: Left;  Regional W/ Catheter: Epidural, Spinal, Adductor  TRACI 50cc        Review of patient's allergies indicates:  No Known Allergies    No current facility-administered medications on file prior to encounter.     Current Outpatient Medications on File Prior to Encounter   Medication Sig    amLODIPine (NORVASC) 10 MG tablet Take 1 tablet (10 mg total) by mouth once daily.    benzocaine (HURRICAINE) 20 % Gel Use as directed 1 application in the mouth or throat 4 (four) times daily as needed.    ibuprofen (ADVIL,MOTRIN) 600 MG tablet Take 1 tablet (600 mg total) by mouth every 6 (six) hours as needed for Pain.    meloxicam (MOBIC) 15 MG tablet Take 1 tablet (15 mg total) by mouth once daily.     Family History       Problem Relation (Age of Onset)    Heart failure Father          Tobacco Use    Smoking status: Former    Smokeless tobacco: Never   Substance and Sexual Activity    Alcohol use: Yes     Comment: occasionally     Drug use: No    Sexual activity: Yes     Partners: Female     Review of Systems   Constitutional:  Negative for diaphoresis and fever.   HENT:  Negative for facial swelling and sore throat.    Respiratory:  Negative for cough, chest  tightness and shortness of breath.    Cardiovascular:  Negative for chest pain, palpitations and leg swelling.   Gastrointestinal:  Positive for abdominal pain (L sided). Negative for nausea.   Genitourinary:  Negative for decreased urine volume and difficulty urinating.   Musculoskeletal:  Negative for back pain and neck pain.   Skin:  Negative for pallor and wound.   Neurological:  Positive for syncope and headaches. Negative for dizziness, speech difficulty and weakness.   Psychiatric/Behavioral:  Negative for confusion and decreased concentration.    Objective:     Vital Signs (Most Recent):  Temp: 98.2 °F (36.8 °C) (09/02/22 0702)  Pulse: 70 (09/02/22 1322)  Resp: 20 (09/02/22 1030)  BP: (!) 173/95 (09/02/22 1030)  SpO2: 95 % (09/02/22 1030)   Vital Signs (24h Range):  Temp:  [98.2 °F (36.8 °C)] 98.2 °F (36.8 °C)  Pulse:  [48-75] 70  Resp:  [18-24] 20  SpO2:  [95 %-99 %] 95 %  BP: (138-210)/() 173/95     Weight: 104.3 kg (230 lb)  Body mass index is 31.19 kg/m².    Physical Exam  Constitutional:       Appearance: Normal appearance.   HENT:      Head: Normocephalic and atraumatic.      Nose: Nose normal.   Eyes:      Extraocular Movements: Extraocular movements intact.   Cardiovascular:      Rate and Rhythm: Normal rate and regular rhythm.      Heart sounds: Murmur heard.   Pulmonary:      Effort: Pulmonary effort is normal.      Breath sounds: Normal breath sounds.   Abdominal:      General: Abdomen is flat. Bowel sounds are normal.      Palpations: Abdomen is soft.   Musculoskeletal:         General: Normal range of motion.      Cervical back: Normal range of motion.   Skin:     General: Skin is warm.   Neurological:      General: No focal deficit present.      Mental Status: He is alert and oriented to person, place, and time.   Psychiatric:         Mood and Affect: Mood normal.         Behavior: Behavior normal.           Significant Labs: All pertinent labs within the past 24 hours have been  reviewed.  CBC:   Recent Labs   Lab 09/02/22  0728   WBC 6.92   HGB 15.6   HCT 46.1        CMP:   Recent Labs   Lab 09/02/22  0728      K 3.4*      CO2 24      BUN 14   CREATININE 0.9   CALCIUM 9.3   PROT 7.8   ALBUMIN 4.0   BILITOT 0.2   ALKPHOS 114   AST 18   ALT 23   ANIONGAP 7*     Magnesium:   Recent Labs   Lab 09/02/22  0728   MG 1.8     Troponin:   Recent Labs   Lab 09/02/22  0728 09/02/22  1033   TROPONINI 0.051* 0.049*     TSH:   Recent Labs   Lab 09/02/22  0728   TSH 1.319     Urine Studies:   Recent Labs   Lab 09/02/22  0731   COLORU Colorless*   APPEARANCEUA Clear   PHUR 7.0   SPECGRAV 1.015   PROTEINUA Negative   GLUCUA Negative   KETONESU Negative   BILIRUBINUA Negative   OCCULTUA Trace*   NITRITE Negative   LEUKOCYTESUR Negative       Significant Imaging: I have reviewed all pertinent imaging results/findings within the past 24 hours.

## 2022-09-02 NOTE — ED TRIAGE NOTES
Arrived via POV with c/o LOC , +head injury, spouse state he was in the bathroom, she heard a bang and he was shaking when she walked in, no hx of seizures. Pt denies loosing loss of bowels or urination.  Pt states he felt a rush of pain in his head then he loss consciousness. Pt now complains of left sided abdominal pain from the impact of the fall. Pt denies chest pain or shortness of breath. Patient states he has pressure in his head.

## 2022-09-02 NOTE — ASSESSMENT & PLAN NOTE
- interval history and physical exam findings as described above  - hypertensive upon arrival to the ED, otherwise VSSAF  - denies CP, SOB, dizziness  - CTA without evidence of PE  - CTH without acute findings  - no previous episodes, currently asymptomatic  - no previous EKG on file, EKG in the ED with Normal sinus rhythm and ST and T wave abnormality  - orthostatic vitals ordered, pending  - echo ordered and is within normal limits, see results below  - lipid panel, A1c in the am  - etiology unknown at this time, concern for cardiac etiology  - consider EEG if further work-up is unremarkable  - consulted cardiology due to elevated troponins in setting of syncopal episode  - will continue to monitor on tele    Results for orders placed during the hospital encounter of 09/02/22    Echo    Interpretation Summary  · The left ventricle is normal in size with normal systolic function. The estimated ejection fraction is 65%.  · Normal right ventricular size with normal right ventricular systolic function.  · Normal left ventricular diastolic function.  · Normal central venous pressure (3 mmHg).

## 2022-09-02 NOTE — HPI
Reji Pond Jr. is a 36 y.o. male with a past medical history of HTN who presented to the ED this morning s/p LOC at home. Patient states that he woke up at 4 AM to get ready for work, was urinating and then woke up on the floor. His wife told him that she heard a loud sound, came into the bathroom and found him shaking and unconscious. Patient states that he hit is abdomen on the sink next to the toilet and hit his head on a carpeted surface. He now is experiencing head pressure and left sided abdominal pain. Patient denies any prodromal symptoms prior to losing consciousness. After regaining consciousness, he denied any changes in mental status. Denies hx of seizures. He stated he watched football last night and went to bed shortly after, denied any alcohol or drug consumption. Pt denies any chest pain or shortness of breath.       Patient states he takes amlodipine for his HTN regularly, unsure of compliance as recent rx is . Has known liver hemangioma. He works a strenuous job with long hours and has multiple children and endorses recent stress. Family history includes younger brother with seizures, biological father  from heart attack, maternal grandfather  from heart attack, and two children with unspecified heart murmurs.     ED: Hypertensive up to 210/114, all other VSS, afebrile. Troponin 1: 0.051, troponin 2: 0.049. D-dimer 0.81. UA negative. UDS negative. CTH: No acute intracranial hemorrhage/injury. CTA: No acute abnormality of the chest, specifically, no evidence of pulmonary thromboembolism. X-ray Ribs: No displaced left-sided rib fracture or associated complication identified in the chest. Given a dose of 325 mg ASA and placed in observation.

## 2022-09-02 NOTE — ASSESSMENT & PLAN NOTE
- BP currently not well-controlled  - patient reports that the often takes his BP medications, but amlodipine rx has been  in 2022 - unsure of compliance  - resume home regimen of amlodipine 10 mg daily  - will continue to monitor and further titrate antihypertensives as clinically indicated

## 2022-09-02 NOTE — ED NOTES
Pt just sent to EDOU from ED.  Pt brought to ED after seizure- like activity and was found to have elevated bp.  All labs sent.  Cardiac monitor and continuous pulse ox in place.       LOC: Patient name and date of birth verified. The patient is awake, alert and aware of environment with an appropriate affect, the patient is oriented x 3 and speaking appropriately.   APPEARANCE: Patient resting comfortably, patient is clean and well groomed, patient's clothing is properly fastened.  SKIN: The skin is warm and dry, color consistent with ethnicity, patient has normal skin turgor and moist mucus membranes, skin intact, no breakdown or bruising noted.  MUSCULOSKELETAL: Patient moving all extremities well, no obvious swelling or deformities noted.   RESPIRATORY: Respirations are spontaneous, patient has a normal effort and rate, no accessory muscle use noted.  CARDIAC: Patient has a normal rate and rhythm, no periphreal edema noted, capillary refill < 3 seconds.  ABDOMEN: Soft and non tender to palpation, no distention noted. Bowel sounds present in all four quadrants.  NEUROLOGIC: Eyes open spontaneously, behavior appropriate to situation, follows commands, facial expression symmetrical, bilateral hand grasp equal and even, purposeful motor response noted, normal sensation in all extremities when touched with a finger.

## 2022-09-02 NOTE — ASSESSMENT & PLAN NOTE
Unclear etiology however top ddx includes seizure vs vasovagal (post micturation) and not cardiogenic  - recommend neurological evaluation for seizures  - monitor on telemetry  - not unreasonable to send home with event monitor (can call cardiology consult for help ordering)

## 2022-09-02 NOTE — HPI
36 y.o. male with a past medical history of HTN who presented to the ED this morning s/p LOC at home.     Pt says that he was urinating when suddenly he fell to the floor.  Denies any palpitations, N/V sensation prior to syncope.  He was on the floor when his girlfriend found him and he was shaking/having awkward movements.  This lasted about 2-3min.  His girlfriend called 911 to bring him to the ED.  Denies any drug use, recent alcohol use.  In the ED he was hypertensive to 210 SBP.  Trop obtained and elevated to .04 to .05.  Ddimer was elevated but CTA negative for PE.  Cardiology consulted for elevated troponin.

## 2022-09-02 NOTE — PHARMACY MED REC
"Admission Medication History     The home medication history was taken by Adolfo Castaneda.    You may go to "Admission" then "Reconcile Home Medications" tabs to review and/or act upon these items.     The home medication list has been updated by the Pharmacy department.   Please read ALL comments highlighted in yellow.   Please address this information as you see fit.    Feel free to contact us if you have any questions or require assistance.      The medications listed below were removed from the home medication list. Please reorder if appropriate:  Patient reports no longer taking the following medication(s):  BENZOCAINE 20 % GEL  IBUPROFEN 600 MG TAB  MELOXICAM 15 MG TAB    Medications listed below were obtained from: Patient    Current Outpatient Medications on File Prior to Encounter   Medication Sig    amLODIPine (NORVASC) 10 MG tablet Take 1 tablet (10 mg total) by mouth once daily.         Potential issues to be addressed PRIOR TO DISCHARGE  Patient requires education regarding drug therapies (He reports dosage ineffective)    Adolfo Castaneda, Eusebio  [id number]                  .        "

## 2022-09-02 NOTE — ASSESSMENT & PLAN NOTE
His BP was 210 systolic on arrival and likely the cause of his elevated troponin  - recommend tighter BP control  - no cardiac intervention necessary

## 2022-09-03 LAB
ALBUMIN SERPL BCP-MCNC: 3.7 G/DL (ref 3.5–5.2)
ALP SERPL-CCNC: 93 U/L (ref 55–135)
ALT SERPL W/O P-5'-P-CCNC: 23 U/L (ref 10–44)
ANION GAP SERPL CALC-SCNC: 9 MMOL/L (ref 8–16)
AST SERPL-CCNC: 20 U/L (ref 10–40)
BASOPHILS # BLD AUTO: 0.07 K/UL (ref 0–0.2)
BASOPHILS NFR BLD: 0.9 % (ref 0–1.9)
BILIRUB SERPL-MCNC: 0.5 MG/DL (ref 0.1–1)
BUN SERPL-MCNC: 14 MG/DL (ref 6–20)
CALCIUM SERPL-MCNC: 9.3 MG/DL (ref 8.7–10.5)
CHLORIDE SERPL-SCNC: 105 MMOL/L (ref 95–110)
CHOLEST SERPL-MCNC: 213 MG/DL (ref 120–199)
CHOLEST/HDLC SERPL: 7.6 {RATIO} (ref 2–5)
CO2 SERPL-SCNC: 25 MMOL/L (ref 23–29)
CREAT SERPL-MCNC: 0.9 MG/DL (ref 0.5–1.4)
DIFFERENTIAL METHOD: NORMAL
EOSINOPHIL # BLD AUTO: 0.2 K/UL (ref 0–0.5)
EOSINOPHIL NFR BLD: 3.2 % (ref 0–8)
ERYTHROCYTE [DISTWIDTH] IN BLOOD BY AUTOMATED COUNT: 14.1 % (ref 11.5–14.5)
EST. GFR  (NO RACE VARIABLE): >60 ML/MIN/1.73 M^2
ESTIMATED AVG GLUCOSE: 105 MG/DL (ref 68–131)
GLUCOSE SERPL-MCNC: 107 MG/DL (ref 70–110)
HBA1C MFR BLD: 5.3 % (ref 4–5.6)
HCT VFR BLD AUTO: 46 % (ref 40–54)
HDLC SERPL-MCNC: 28 MG/DL (ref 40–75)
HDLC SERPL: 13.1 % (ref 20–50)
HGB BLD-MCNC: 15.4 G/DL (ref 14–18)
IMM GRANULOCYTES # BLD AUTO: 0.02 K/UL (ref 0–0.04)
IMM GRANULOCYTES NFR BLD AUTO: 0.3 % (ref 0–0.5)
LDLC SERPL CALC-MCNC: 152.2 MG/DL (ref 63–159)
LYMPHOCYTES # BLD AUTO: 2.1 K/UL (ref 1–4.8)
LYMPHOCYTES NFR BLD: 27.6 % (ref 18–48)
MCH RBC QN AUTO: 27.5 PG (ref 27–31)
MCHC RBC AUTO-ENTMCNC: 33.5 G/DL (ref 32–36)
MCV RBC AUTO: 82 FL (ref 82–98)
MONOCYTES # BLD AUTO: 0.6 K/UL (ref 0.3–1)
MONOCYTES NFR BLD: 7.6 % (ref 4–15)
NEUTROPHILS # BLD AUTO: 4.5 K/UL (ref 1.8–7.7)
NEUTROPHILS NFR BLD: 60.4 % (ref 38–73)
NONHDLC SERPL-MCNC: 185 MG/DL
NRBC BLD-RTO: 0 /100 WBC
PLATELET # BLD AUTO: 242 K/UL (ref 150–450)
PMV BLD AUTO: 11.8 FL (ref 9.2–12.9)
POTASSIUM SERPL-SCNC: 3.1 MMOL/L (ref 3.5–5.1)
PROT SERPL-MCNC: 7.1 G/DL (ref 6–8.4)
RBC # BLD AUTO: 5.61 M/UL (ref 4.6–6.2)
SODIUM SERPL-SCNC: 139 MMOL/L (ref 136–145)
TRIGL SERPL-MCNC: 164 MG/DL (ref 30–150)
WBC # BLD AUTO: 7.46 K/UL (ref 3.9–12.7)

## 2022-09-03 PROCEDURE — 99226 PR SUBSEQUENT OBSERVATION CARE,LEVEL III: ICD-10-PCS | Mod: ,,,

## 2022-09-03 PROCEDURE — 83036 HEMOGLOBIN GLYCOSYLATED A1C: CPT

## 2022-09-03 PROCEDURE — 36415 COLL VENOUS BLD VENIPUNCTURE: CPT

## 2022-09-03 PROCEDURE — 99226 PR SUBSEQUENT OBSERVATION CARE,LEVEL III: CPT | Mod: ,,,

## 2022-09-03 PROCEDURE — 80053 COMPREHEN METABOLIC PANEL: CPT

## 2022-09-03 PROCEDURE — 25000003 PHARM REV CODE 250

## 2022-09-03 PROCEDURE — 85025 COMPLETE CBC W/AUTO DIFF WBC: CPT

## 2022-09-03 PROCEDURE — G0378 HOSPITAL OBSERVATION PER HR: HCPCS

## 2022-09-03 PROCEDURE — 80061 LIPID PANEL: CPT

## 2022-09-03 PROCEDURE — 63600175 PHARM REV CODE 636 W HCPCS: Performed by: NURSE PRACTITIONER

## 2022-09-03 PROCEDURE — 25000003 PHARM REV CODE 250: Performed by: NURSE PRACTITIONER

## 2022-09-03 PROCEDURE — 96374 THER/PROPH/DIAG INJ IV PUSH: CPT

## 2022-09-03 RX ORDER — HYDRALAZINE HYDROCHLORIDE 25 MG/1
25 TABLET, FILM COATED ORAL ONCE
Status: COMPLETED | OUTPATIENT
Start: 2022-09-03 | End: 2022-09-03

## 2022-09-03 RX ORDER — POTASSIUM CHLORIDE 20 MEQ/1
40 TABLET, EXTENDED RELEASE ORAL EVERY 4 HOURS
Status: COMPLETED | OUTPATIENT
Start: 2022-09-03 | End: 2022-09-03

## 2022-09-03 RX ADMIN — AMLODIPINE BESYLATE 10 MG: 10 TABLET ORAL at 09:09

## 2022-09-03 RX ADMIN — POTASSIUM CHLORIDE 40 MEQ: 1500 TABLET, EXTENDED RELEASE ORAL at 10:09

## 2022-09-03 RX ADMIN — HYDRALAZINE HYDROCHLORIDE 25 MG: 25 TABLET, FILM COATED ORAL at 09:09

## 2022-09-03 RX ADMIN — POTASSIUM CHLORIDE 40 MEQ: 1500 TABLET, EXTENDED RELEASE ORAL at 03:09

## 2022-09-03 RX ADMIN — HYDRALAZINE HYDROCHLORIDE 10 MG: 20 INJECTION, SOLUTION INTRAMUSCULAR; INTRAVENOUS at 08:09

## 2022-09-03 NOTE — HOSPITAL COURSE
Patient placed in observation due to syncope. Troponin elevated at 0.051 --> 0.049. No ST changes on EKG. Echo within normal limits. Cardiology consulted due to elevated troponin in setting of syncope, recommend controlling BP and ruling out neurological causes. EEG ordered, normal study results, no neurology consult placed. Renal artery US ordered, no evidence of renal artery stenosis. Bilateral carotid US without evidence of stenosis. Labs to rule out secondary causes of HTN pending. Modified HTN medications to include lisinopril 40 mg daily, nifedipine 60 mg qhs and carvedilol 6.125 mg BID. Discontinued amlodipine. Blood pressure controlled with new regimen, patient stable for discharge. All medications delivered to bedside. Pt will purchase blood pressure cuff, monitor blood pressures twice daily at home and keep a log. To f/u with PCP on 9/12/22. Return precautions given, patient verbalized understanding, all questions answered.

## 2022-09-03 NOTE — PROGRESS NOTES
[facility] - Tele[facility]down ([address] Medicine  Progress Note    Patient Name: Reji Pond Jr.  MRN: [id number]  Patient Class: OP- Observation   Admission Date: 2022  Length of Stay: 0 days  Attending Physician: Stevenson Girard MD  Primary Care Provider: Deangelo Rasheed MD        Subjective:     Principal Problem:Syncope        HPI:  Reji Pond Jr. is a 36 y.o. male with a past medical history of HTN who presented to the ED this morning s/p LOC at home. Patient states that he woke up at 4 AM to get ready for work, was urinating and then woke up on the floor. His wife told him that she heard a loud sound, came into the bathroom and found him shaking and unconscious. Patient states that he hit is abdomen on the sink next to the toilet and hit his head on a carpeted surface. He now is experiencing head pressure and left sided abdominal pain. Patient denies any prodromal symptoms prior to losing consciousness. After regaining consciousness, he denied any changes in mental status. Denies hx of seizures. He stated he watched football last night and went to bed shortly after, denied any alcohol or drug consumption. Pt denies any chest pain or shortness of breath.       Patient states he takes amlodipine for his HTN regularly, unsure of compliance as recent rx is . Has known liver hemangioma. He works a strenuous job with long hours and has multiple children and endorses recent stress. Family history includes younger brother with seizures, biological father  from heart attack, maternal grandfather  from heart attack, and two children with unspecified heart murmurs.     ED: Hypertensive up to 210/114, all other VSS, afebrile. Troponin 1: 0.051, troponin 2: 0.049. D-dimer 0.81. UA negative. UDS negative. CTH: No acute intracranial hemorrhage/injury. CTA: No acute abnormality of the chest, specifically, no evidence of pulmonary thromboembolism. X-ray Ribs: No displaced  left-sided rib fracture or associated complication identified in the chest. Given a dose of 325 mg ASA and placed in observation.      Overview/Hospital Course:  Patient placed in observation due to syncope. Troponin elevated at 0.051 --> 0.049. No ST changes on EKG. Echo within normal limits. Cardiology consulted due to elevated troponin in setting of syncope, recommend controlling BP and ruling out neurological causes. EEG ordered, consider neurology consult based off results. Bilateral carotid US ordered.      Interval History: Patient seen and examined today. Still remains mildly hypertensive, but better controlled since resuming amlodipine. Otherwise VSSAF. Echo within normal limits. Cardiology consulted due to elevated troponin in setting of syncope, recommend controlling BP and ruling out neurological causes. EEG pending, consider neurology consult based off results. Bilateral carotid US pending. Orthostatics pending.    Review of Systems   Constitutional:  Negative for diaphoresis and fever.   HENT:  Negative for facial swelling and sore throat.    Respiratory:  Negative for cough, chest tightness and shortness of breath.    Cardiovascular:  Negative for chest pain, palpitations and leg swelling.   Gastrointestinal:  Positive for abdominal pain (L sided). Negative for nausea.   Genitourinary:  Negative for decreased urine volume and difficulty urinating.   Musculoskeletal:  Negative for back pain and neck pain.   Skin:  Negative for pallor and wound.   Neurological:  Positive for syncope and headaches. Negative for dizziness, speech difficulty and weakness.   Psychiatric/Behavioral:  Negative for confusion and decreased concentration.    Objective:     Vital Signs (Most Recent):  Temp: 96.6 °F (35.9 °C) (09/03/22 1100)  Pulse: 70 (09/03/22 1100)  Resp: 18 (09/03/22 1100)  BP: (!) 150/82 (09/03/22 1100)  SpO2: 95 % (09/03/22 1100)   Vital Signs (24h Range):  Temp:  [96.4 °F (35.8 °C)-98.7 °F (37.1 °C)] 96.6  °F (35.9 °C)  Pulse:  [54-74] 70  Resp:  [18] 18  SpO2:  [94 %-98 %] 95 %  BP: (128-172)/() 150/82     Weight: 102.5 kg (225 lb 15.5 oz)  Body mass index is 30.65 kg/m².  No intake or output data in the 24 hours ending 09/03/22 1408   Physical Exam  Constitutional:       Appearance: Normal appearance.   HENT:      Head: Normocephalic and atraumatic.      Nose: Nose normal.   Eyes:      Extraocular Movements: Extraocular movements intact.   Cardiovascular:      Rate and Rhythm: Normal rate and regular rhythm.      Heart sounds: Murmur heard.   Pulmonary:      Effort: Pulmonary effort is normal.      Breath sounds: Normal breath sounds.   Abdominal:      General: Abdomen is flat. Bowel sounds are normal.      Palpations: Abdomen is soft.   Musculoskeletal:         General: Normal range of motion.      Cervical back: Normal range of motion.   Skin:     General: Skin is warm.   Neurological:      General: No focal deficit present.      Mental Status: He is alert and oriented to person, place, and time.   Psychiatric:         Mood and Affect: Mood normal.         Behavior: Behavior normal.       Significant Labs: All pertinent labs within the past 24 hours have been reviewed.  A1C:   Recent Labs   Lab 09/03/22 0421   HGBA1C 5.3     CBC:   Recent Labs   Lab 09/02/22 0728 09/03/22 0421   WBC 6.92 7.46   HGB 15.6 15.4   HCT 46.1 46.0    242     CMP:   Recent Labs   Lab 09/02/22 0728 09/03/22 0421    139   K 3.4* 3.1*    105   CO2 24 25    107   BUN 14 14   CREATININE 0.9 0.9   CALCIUM 9.3 9.3   PROT 7.8 7.1   ALBUMIN 4.0 3.7   BILITOT 0.2 0.5   ALKPHOS 114 93   AST 18 20   ALT 23 23   ANIONGAP 7* 9     Lipid Panel:   Recent Labs   Lab 09/03/22 0421   CHOL 213*   HDL 28*   LDLCALC 152.2   TRIG 164*   CHOLHDL 13.1*       Significant Imaging: I have reviewed all pertinent imaging results/findings within the past 24 hours.      Assessment/Plan:      * Syncope  - interval history and  physical exam findings as described above  - hypertensive upon arrival to the ED, otherwise VSSAF  - denies CP, SOB, dizziness  - CTA without evidence of PE  - CTH without acute findings  - no previous episodes, currently asymptomatic  - no previous EKG on file, EKG in the ED with Normal sinus rhythm and ST and T wave abnormality  - orthostatic vitals ordered, pending  - echo ordered and is within normal limits, see results below  - lipid panel, A1c ordered  - EEG ordered  - consulted cardiology due to elevated troponins in setting of syncopal episode, low suspicion for cardiac etiology, recommend neurological work-up  - will continue to monitor on tele    Results for orders placed during the hospital encounter of 22    Echo    Interpretation Summary  · The left ventricle is normal in size with normal systolic function. The estimated ejection fraction is 65%.  · Normal right ventricular size with normal right ventricular systolic function.  · Normal left ventricular diastolic function.  · Normal central venous pressure (3 mmHg).        HTN (hypertension)  - BP currently not well-controlled  - patient reports that the often takes his BP medications, but amlodipine rx has been  in 2022 - unsure of compliance  - resume home regimen of amlodipine 10 mg daily  - will continue to monitor and further titrate antihypertensives as clinically indicated           VTE Risk Mitigation (From admission, onward)         Ordered     IP VTE LOW RISK PATIENT  Once         22 1057                Discharge Planning   TYLOR:      Code Status: Full Code   Is the patient medically ready for discharge?:     Reason for patient still in hospital (select all that apply): Patient trending condition, Treatment and Imaging                     Daja Mann PA-C  Department of Hospital Medicine   [facility] - Tele[facility]down ([place])

## 2022-09-03 NOTE — ASSESSMENT & PLAN NOTE
- interval history and physical exam findings as described above  - hypertensive upon arrival to the ED, otherwise VSSAF  - denies CP, SOB, dizziness  - CTA without evidence of PE  - CTH without acute findings  - no previous episodes, currently asymptomatic  - no previous EKG on file, EKG in the ED with Normal sinus rhythm and ST and T wave abnormality  - orthostatic vitals ordered, pending  - echo ordered and is within normal limits, see results below  - lipid panel, A1c ordered  - EEG ordered  - consulted cardiology due to elevated troponins in setting of syncopal episode, low suspicion for cardiac etiology, recommend neurological work-up  - will continue to monitor on tele    Results for orders placed during the hospital encounter of 09/02/22    Echo    Interpretation Summary  · The left ventricle is normal in size with normal systolic function. The estimated ejection fraction is 65%.  · Normal right ventricular size with normal right ventricular systolic function.  · Normal left ventricular diastolic function.  · Normal central venous pressure (3 mmHg).

## 2022-09-03 NOTE — PLAN OF CARE
Problem: Syncope  Goal: Absence of Syncopal Symptoms  Outcome: Ongoing, Progressing     Problem: Hypertension Acute  Goal: Blood Pressure Within Desired Range  Outcome: Ongoing, Progressing     Problem: Infection  Goal: Absence of Infection Signs and Symptoms  Outcome: Ongoing, Progressing     Problem: Adult Inpatient Plan of Care  Goal: Optimal Comfort and Wellbeing  Outcome: Ongoing, Progressing

## 2022-09-03 NOTE — NURSING
Patient transported to unit via wheelchair.  VSS with the exception of elevated bp 107/103. MD notified.  Patient oriented to room.  Safety and fall precautions initiated.  Respirations even and unlabored.   Will continue to monitor.

## 2022-09-03 NOTE — SUBJECTIVE & OBJECTIVE
Interval History: Patient seen and examined today. Still remains mildly hypertensive, but better controlled since resuming amlodipine. Otherwise VSSAF. Echo within normal limits. Cardiology consulted due to elevated troponin in setting of syncope, recommend controlling BP and ruling out neurological causes. EEG pending, consider neurology consult based off results. Bilateral carotid US pending. Orthostatics pending.    Review of Systems   Constitutional:  Negative for diaphoresis and fever.   HENT:  Negative for facial swelling and sore throat.    Respiratory:  Negative for cough, chest tightness and shortness of breath.    Cardiovascular:  Negative for chest pain, palpitations and leg swelling.   Gastrointestinal:  Positive for abdominal pain (L sided). Negative for nausea.   Genitourinary:  Negative for decreased urine volume and difficulty urinating.   Musculoskeletal:  Negative for back pain and neck pain.   Skin:  Negative for pallor and wound.   Neurological:  Positive for syncope and headaches. Negative for dizziness, speech difficulty and weakness.   Psychiatric/Behavioral:  Negative for confusion and decreased concentration.    Objective:     Vital Signs (Most Recent):  Temp: 96.6 °F (35.9 °C) (09/03/22 1100)  Pulse: 70 (09/03/22 1100)  Resp: 18 (09/03/22 1100)  BP: (!) 150/82 (09/03/22 1100)  SpO2: 95 % (09/03/22 1100)   Vital Signs (24h Range):  Temp:  [96.4 °F (35.8 °C)-98.7 °F (37.1 °C)] 96.6 °F (35.9 °C)  Pulse:  [54-74] 70  Resp:  [18] 18  SpO2:  [94 %-98 %] 95 %  BP: (128-172)/() 150/82     Weight: 102.5 kg (225 lb 15.5 oz)  Body mass index is 30.65 kg/m².  No intake or output data in the 24 hours ending 09/03/22 1408   Physical Exam  Constitutional:       Appearance: Normal appearance.   HENT:      Head: Normocephalic and atraumatic.      Nose: Nose normal.   Eyes:      Extraocular Movements: Extraocular movements intact.   Cardiovascular:      Rate and Rhythm: Normal rate and regular rhythm.       Heart sounds: Murmur heard.   Pulmonary:      Effort: Pulmonary effort is normal.      Breath sounds: Normal breath sounds.   Abdominal:      General: Abdomen is flat. Bowel sounds are normal.      Palpations: Abdomen is soft.   Musculoskeletal:         General: Normal range of motion.      Cervical back: Normal range of motion.   Skin:     General: Skin is warm.   Neurological:      General: No focal deficit present.      Mental Status: He is alert and oriented to person, place, and time.   Psychiatric:         Mood and Affect: Mood normal.         Behavior: Behavior normal.       Significant Labs: All pertinent labs within the past 24 hours have been reviewed.  A1C:   Recent Labs   Lab 09/03/22 0421   HGBA1C 5.3     CBC:   Recent Labs   Lab 09/02/22 0728 09/03/22 0421   WBC 6.92 7.46   HGB 15.6 15.4   HCT 46.1 46.0    242     CMP:   Recent Labs   Lab 09/02/22 0728 09/03/22 0421    139   K 3.4* 3.1*    105   CO2 24 25    107   BUN 14 14   CREATININE 0.9 0.9   CALCIUM 9.3 9.3   PROT 7.8 7.1   ALBUMIN 4.0 3.7   BILITOT 0.2 0.5   ALKPHOS 114 93   AST 18 20   ALT 23 23   ANIONGAP 7* 9     Lipid Panel:   Recent Labs   Lab 09/03/22 0421   CHOL 213*   HDL 28*   LDLCALC 152.2   TRIG 164*   CHOLHDL 13.1*       Significant Imaging: I have reviewed all pertinent imaging results/findings within the past 24 hours.

## 2022-09-04 LAB
ALBUMIN SERPL BCP-MCNC: 3.7 G/DL (ref 3.5–5.2)
ALP SERPL-CCNC: 89 U/L (ref 55–135)
ALT SERPL W/O P-5'-P-CCNC: 26 U/L (ref 10–44)
ANION GAP SERPL CALC-SCNC: 8 MMOL/L (ref 8–16)
AST SERPL-CCNC: 16 U/L (ref 10–40)
BASOPHILS # BLD AUTO: 0.05 K/UL (ref 0–0.2)
BASOPHILS NFR BLD: 0.6 % (ref 0–1.9)
BILIRUB SERPL-MCNC: 0.4 MG/DL (ref 0.1–1)
BUN SERPL-MCNC: 10 MG/DL (ref 6–20)
CALCIUM SERPL-MCNC: 9 MG/DL (ref 8.7–10.5)
CHLORIDE SERPL-SCNC: 105 MMOL/L (ref 95–110)
CO2 SERPL-SCNC: 26 MMOL/L (ref 23–29)
CREAT SERPL-MCNC: 0.7 MG/DL (ref 0.5–1.4)
DIFFERENTIAL METHOD: NORMAL
EOSINOPHIL # BLD AUTO: 0.2 K/UL (ref 0–0.5)
EOSINOPHIL NFR BLD: 2.6 % (ref 0–8)
ERYTHROCYTE [DISTWIDTH] IN BLOOD BY AUTOMATED COUNT: 14.2 % (ref 11.5–14.5)
EST. GFR  (NO RACE VARIABLE): >60 ML/MIN/1.73 M^2
GLUCOSE SERPL-MCNC: 99 MG/DL (ref 70–110)
HCT VFR BLD AUTO: 46.5 % (ref 40–54)
HGB BLD-MCNC: 15.7 G/DL (ref 14–18)
IMM GRANULOCYTES # BLD AUTO: 0.01 K/UL (ref 0–0.04)
IMM GRANULOCYTES NFR BLD AUTO: 0.1 % (ref 0–0.5)
LYMPHOCYTES # BLD AUTO: 2.4 K/UL (ref 1–4.8)
LYMPHOCYTES NFR BLD: 29.8 % (ref 18–48)
MCH RBC QN AUTO: 27.7 PG (ref 27–31)
MCHC RBC AUTO-ENTMCNC: 33.8 G/DL (ref 32–36)
MCV RBC AUTO: 82 FL (ref 82–98)
MONOCYTES # BLD AUTO: 0.7 K/UL (ref 0.3–1)
MONOCYTES NFR BLD: 8.1 % (ref 4–15)
NEUTROPHILS # BLD AUTO: 4.7 K/UL (ref 1.8–7.7)
NEUTROPHILS NFR BLD: 58.8 % (ref 38–73)
NRBC BLD-RTO: 0 /100 WBC
PLATELET # BLD AUTO: 260 K/UL (ref 150–450)
PMV BLD AUTO: 11.8 FL (ref 9.2–12.9)
POTASSIUM SERPL-SCNC: 3.4 MMOL/L (ref 3.5–5.1)
PROT SERPL-MCNC: 7.1 G/DL (ref 6–8.4)
RBC # BLD AUTO: 5.66 M/UL (ref 4.6–6.2)
SODIUM SERPL-SCNC: 139 MMOL/L (ref 136–145)
WBC # BLD AUTO: 8 K/UL (ref 3.9–12.7)

## 2022-09-04 PROCEDURE — 99226 PR SUBSEQUENT OBSERVATION CARE,LEVEL III: ICD-10-PCS | Mod: ,,,

## 2022-09-04 PROCEDURE — 84244 ASSAY OF RENIN: CPT

## 2022-09-04 PROCEDURE — 82088 ASSAY OF ALDOSTERONE: CPT

## 2022-09-04 PROCEDURE — G0378 HOSPITAL OBSERVATION PER HR: HCPCS

## 2022-09-04 PROCEDURE — 95813 EEG EXTND MNTR 61-119 MIN: CPT

## 2022-09-04 PROCEDURE — 94761 N-INVAS EAR/PLS OXIMETRY MLT: CPT

## 2022-09-04 PROCEDURE — 83835 ASSAY OF METANEPHRINES: CPT

## 2022-09-04 PROCEDURE — 99226 PR SUBSEQUENT OBSERVATION CARE,LEVEL III: CPT | Mod: ,,,

## 2022-09-04 PROCEDURE — 25000003 PHARM REV CODE 250

## 2022-09-04 PROCEDURE — 80053 COMPREHEN METABOLIC PANEL: CPT

## 2022-09-04 PROCEDURE — 36415 COLL VENOUS BLD VENIPUNCTURE: CPT

## 2022-09-04 PROCEDURE — 85025 COMPLETE CBC W/AUTO DIFF WBC: CPT

## 2022-09-04 RX ORDER — AMLODIPINE BESYLATE 10 MG/1
10 TABLET ORAL NIGHTLY
Status: DISCONTINUED | OUTPATIENT
Start: 2022-09-04 | End: 2022-09-05

## 2022-09-04 RX ORDER — LISINOPRIL 10 MG/1
10 TABLET ORAL ONCE
Status: COMPLETED | OUTPATIENT
Start: 2022-09-04 | End: 2022-09-04

## 2022-09-04 RX ORDER — LISINOPRIL 20 MG/1
20 TABLET ORAL DAILY
Status: DISCONTINUED | OUTPATIENT
Start: 2022-09-05 | End: 2022-09-05

## 2022-09-04 RX ORDER — LISINOPRIL 10 MG/1
10 TABLET ORAL DAILY
Status: DISCONTINUED | OUTPATIENT
Start: 2022-09-04 | End: 2022-09-04

## 2022-09-04 RX ORDER — POTASSIUM CHLORIDE 20 MEQ/1
40 TABLET, EXTENDED RELEASE ORAL ONCE
Status: COMPLETED | OUTPATIENT
Start: 2022-09-04 | End: 2022-09-04

## 2022-09-04 RX ADMIN — AMLODIPINE BESYLATE 10 MG: 10 TABLET ORAL at 09:09

## 2022-09-04 RX ADMIN — LISINOPRIL 10 MG: 10 TABLET ORAL at 08:09

## 2022-09-04 RX ADMIN — LISINOPRIL 10 MG: 10 TABLET ORAL at 01:09

## 2022-09-04 RX ADMIN — POTASSIUM CHLORIDE 40 MEQ: 1500 TABLET, EXTENDED RELEASE ORAL at 01:09

## 2022-09-04 NOTE — PROGRESS NOTES
[facility] - Tele[facility] ([address] Medicine  Progress Note    Patient Name: Reji Pond Jr.  MRN: [id number]  Patient Class: OP- Observation   Admission Date: 2022  Length of Stay: 0 days  Attending Physician: Ramos Castro MD  Primary Care Provider: Deangelo Rasheed MD        Subjective:     Principal Problem:HTN (hypertension)        HPI:  Reji Pond Jr. is a 36 y.o. male with a past medical history of HTN who presented to the ED this morning s/p LOC at home. Patient states that he woke up at 4 AM to get ready for work, was urinating and then woke up on the floor. His wife told him that she heard a loud sound, came into the bathroom and found him shaking and unconscious. Patient states that he hit is abdomen on the sink next to the toilet and hit his head on a carpeted surface. He now is experiencing head pressure and left sided abdominal pain. Patient denies any prodromal symptoms prior to losing consciousness. After regaining consciousness, he denied any changes in mental status. Denies hx of seizures. He stated he watched football last night and went to bed shortly after, denied any alcohol or drug consumption. Pt denies any chest pain or shortness of breath.       Patient states he takes amlodipine for his HTN regularly, unsure of compliance as recent rx is . Has known liver hemangioma. He works a strenuous job with long hours and has multiple children and endorses recent stress. Family history includes younger brother with seizures, biological father  from heart attack, maternal grandfather  from heart attack, and two children with unspecified heart murmurs.     ED: Hypertensive up to 210/114, all other VSS, afebrile. Troponin 1: 0.051, troponin 2: 0.049. D-dimer 0.81. UA negative. UDS negative. CTH: No acute intracranial hemorrhage/injury. CTA: No acute abnormality of the chest, specifically, no evidence of pulmonary thromboembolism. X-ray Ribs: No  displaced left-sided rib fracture or associated complication identified in the chest. Given a dose of 325 mg ASA and placed in observation.      Overview/Hospital Course:  Patient placed in observation due to syncope. Troponin elevated at 0.051 --> 0.049. No ST changes on EKG. Echo within normal limits. Cardiology consulted due to elevated troponin in setting of syncope, recommend controlling BP and ruling out neurological causes. EEG ordered, consider neurology consult based off results. Bilateral carotid US ordered.      Interval History: Patient seen and examined today. Patient hypertensive overnight requiring hydralazine. Will initiate lisinopril in the am, and amlodipine at night. Carotid US and EEG still pending.     Patient still hypertensive after receiving 10 mg lisinopril this am, will increase daily dose to 20 mg. Giving an additional 10 mg once.     Review of Systems   Constitutional:  Negative for diaphoresis and fever.   HENT:  Negative for facial swelling and sore throat.    Respiratory:  Negative for cough, chest tightness and shortness of breath.    Cardiovascular:  Negative for chest pain, palpitations and leg swelling.   Gastrointestinal:  Positive for abdominal pain (L sided). Negative for nausea.   Genitourinary:  Negative for decreased urine volume and difficulty urinating.   Musculoskeletal:  Negative for back pain and neck pain.   Skin:  Negative for pallor and wound.   Neurological:  Positive for syncope and headaches. Negative for dizziness, speech difficulty and weakness.   Psychiatric/Behavioral:  Negative for confusion and decreased concentration.    Objective:     Vital Signs (Most Recent):  Temp: 98.3 °F (36.8 °C) (09/04/22 1149)  Pulse: 73 (09/04/22 1149)  Resp: 18 (09/04/22 1149)  BP: (!) 172/90 (09/04/22 1149)  SpO2: 98 % (09/04/22 1149)   Vital Signs (24h Range):  Temp:  [96.6 °F (35.9 °C)-98.7 °F (37.1 °C)] 98.3 °F (36.8 °C)  Pulse:  [67-80] 73  Resp:  [18-20] 18  SpO2:  [95 %-98  %] 98 %  BP: (156-183)/() 172/90     Weight: 102.5 kg (225 lb 15.5 oz)  Body mass index is 30.65 kg/m².  No intake or output data in the 24 hours ending 22 1219   Physical Exam  Constitutional:       Appearance: Normal appearance.   HENT:      Head: Normocephalic and atraumatic.      Nose: Nose normal.   Eyes:      Extraocular Movements: Extraocular movements intact.   Cardiovascular:      Rate and Rhythm: Normal rate and regular rhythm.      Heart sounds: Murmur heard.   Pulmonary:      Effort: Pulmonary effort is normal.      Breath sounds: Normal breath sounds.   Abdominal:      General: Abdomen is flat. Bowel sounds are normal.      Palpations: Abdomen is soft.   Musculoskeletal:         General: Normal range of motion.      Cervical back: Normal range of motion.   Skin:     General: Skin is warm.   Neurological:      General: No focal deficit present.      Mental Status: He is alert and oriented to person, place, and time.   Psychiatric:         Mood and Affect: Mood normal.         Behavior: Behavior normal.       Significant Labs: All pertinent labs within the past 24 hours have been reviewed.  CBC:   Recent Labs   Lab 22   WBC 7.46 8.00   HGB 15.4 15.7   HCT 46.0 46.5    260     CMP:   Recent Labs   Lab 22    139   K 3.1* 3.4*    105   CO2 25 26    99   BUN 14 10   CREATININE 0.9 0.7   CALCIUM 9.3 9.0   PROT 7.1 7.1   ALBUMIN 3.7 3.7   BILITOT 0.5 0.4   ALKPHOS 93 89   AST 20 16   ALT 23 26   ANIONGAP 9 8       Significant Imaging: I have reviewed all pertinent imaging results/findings within the past 24 hours.      Assessment/Plan:      * HTN (hypertension)  - BP currently not well-controlled  - patient reports that the often takes his BP medications, but amlodipine rx has been  in 2022 - unsure of compliance  - resume home regimen of amlodipine 10 mg daily   - add lisinopril 10 mg qam, give amlodipine 10  mg qhs   - still hypertensive after 10 mg lisinopril, will increase daily dose to 20 mg daily  - will continue to monitor and further titrate antihypertensives as clinically indicated         Syncope  - interval history and physical exam findings as described above  - hypertensive upon arrival to the ED, otherwise VSSAF  - denies CP, SOB, dizziness  - CTA without evidence of PE  - CTH without acute findings  - no previous episodes, currently asymptomatic  - no previous EKG on file, EKG in the ED with Normal sinus rhythm and ST and T wave abnormality  - orthostatic vitals ordered, pending  - echo ordered and is within normal limits, see results below  - lipid panel, A1c ordered  - EEG ordered  - consulted cardiology due to elevated troponins in setting of syncopal episode, low suspicion for cardiac etiology, recommend neurological work-up  - will continue to monitor on tele    Results for orders placed during the hospital encounter of 09/02/22    Echo    Interpretation Summary  · The left ventricle is normal in size with normal systolic function. The estimated ejection fraction is 65%.  · Normal right ventricular size with normal right ventricular systolic function.  · Normal left ventricular diastolic function.  · Normal central venous pressure (3 mmHg).          VTE Risk Mitigation (From admission, onward)         Ordered     IP VTE LOW RISK PATIENT  Once         09/02/22 1057                Discharge Planning   TYLOR:      Code Status: Full Code   Is the patient medically ready for discharge?:     Reason for patient still in hospital (select all that apply): Patient trending condition, Treatment and Imaging                     Daja Mann PA-C  Department of Hospital Medicine   [facility] - Tele[facility] ([place])

## 2022-09-04 NOTE — SUBJECTIVE & OBJECTIVE
Interval History: Patient seen and examined today. Patient hypertensive overnight requiring hydralazine. Will initiate lisinopril in the am, and amlodipine at night. Carotid US and EEG still pending.     Patient still hypertensive after receiving 10 mg lisinopril this am, will increase daily dose to 20 mg. Giving an additional 10 mg once.     Review of Systems   Constitutional:  Negative for diaphoresis and fever.   HENT:  Negative for facial swelling and sore throat.    Respiratory:  Negative for cough, chest tightness and shortness of breath.    Cardiovascular:  Negative for chest pain, palpitations and leg swelling.   Gastrointestinal:  Positive for abdominal pain (L sided). Negative for nausea.   Genitourinary:  Negative for decreased urine volume and difficulty urinating.   Musculoskeletal:  Negative for back pain and neck pain.   Skin:  Negative for pallor and wound.   Neurological:  Positive for syncope and headaches. Negative for dizziness, speech difficulty and weakness.   Psychiatric/Behavioral:  Negative for confusion and decreased concentration.    Objective:     Vital Signs (Most Recent):  Temp: 98.3 °F (36.8 °C) (09/04/22 1149)  Pulse: 73 (09/04/22 1149)  Resp: 18 (09/04/22 1149)  BP: (!) 172/90 (09/04/22 1149)  SpO2: 98 % (09/04/22 1149)   Vital Signs (24h Range):  Temp:  [96.6 °F (35.9 °C)-98.7 °F (37.1 °C)] 98.3 °F (36.8 °C)  Pulse:  [67-80] 73  Resp:  [18-20] 18  SpO2:  [95 %-98 %] 98 %  BP: (156-183)/() 172/90     Weight: 102.5 kg (225 lb 15.5 oz)  Body mass index is 30.65 kg/m².  No intake or output data in the 24 hours ending 09/04/22 1219   Physical Exam  Constitutional:       Appearance: Normal appearance.   HENT:      Head: Normocephalic and atraumatic.      Nose: Nose normal.   Eyes:      Extraocular Movements: Extraocular movements intact.   Cardiovascular:      Rate and Rhythm: Normal rate and regular rhythm.      Heart sounds: Murmur heard.   Pulmonary:      Effort: Pulmonary effort  is normal.      Breath sounds: Normal breath sounds.   Abdominal:      General: Abdomen is flat. Bowel sounds are normal.      Palpations: Abdomen is soft.   Musculoskeletal:         General: Normal range of motion.      Cervical back: Normal range of motion.   Skin:     General: Skin is warm.   Neurological:      General: No focal deficit present.      Mental Status: He is alert and oriented to person, place, and time.   Psychiatric:         Mood and Affect: Mood normal.         Behavior: Behavior normal.       Significant Labs: All pertinent labs within the past 24 hours have been reviewed.  CBC:   Recent Labs   Lab 09/03/22 0421 09/04/22 0427   WBC 7.46 8.00   HGB 15.4 15.7   HCT 46.0 46.5    260     CMP:   Recent Labs   Lab 09/03/22 0421 09/04/22 0427    139   K 3.1* 3.4*    105   CO2 25 26    99   BUN 14 10   CREATININE 0.9 0.7   CALCIUM 9.3 9.0   PROT 7.1 7.1   ALBUMIN 3.7 3.7   BILITOT 0.5 0.4   ALKPHOS 93 89   AST 20 16   ALT 23 26   ANIONGAP 9 8       Significant Imaging: I have reviewed all pertinent imaging results/findings within the past 24 hours.

## 2022-09-04 NOTE — ASSESSMENT & PLAN NOTE
- BP currently not well-controlled  - patient reports that the often takes his BP medications, but amlodipine rx has been  in 2022 - unsure of compliance  - resume home regimen of amlodipine 10 mg daily   - add lisinopril 10 mg qam, give amlodipine 10 mg qhs   - still hypertensive after 10 mg lisinopril, will increase daily dose to 20 mg daily  - will continue to monitor and further titrate antihypertensives as clinically indicated

## 2022-09-04 NOTE — PLAN OF CARE
Problem: Adult Inpatient Plan of Care  Goal: Plan of Care Review  Outcome: Ongoing, Progressing     Problem: Syncope  Goal: Absence of Syncopal Symptoms  Outcome: Ongoing, Progressing

## 2022-09-05 LAB
ALBUMIN SERPL BCP-MCNC: 3.8 G/DL (ref 3.5–5.2)
ALP SERPL-CCNC: 98 U/L (ref 55–135)
ALT SERPL W/O P-5'-P-CCNC: 28 U/L (ref 10–44)
ANION GAP SERPL CALC-SCNC: 10 MMOL/L (ref 8–16)
ANISOCYTOSIS BLD QL SMEAR: SLIGHT
AST SERPL-CCNC: 16 U/L (ref 10–40)
BASOPHILS # BLD AUTO: 0.07 K/UL (ref 0–0.2)
BASOPHILS NFR BLD: 0.9 % (ref 0–1.9)
BILIRUB SERPL-MCNC: 0.2 MG/DL (ref 0.1–1)
BUN SERPL-MCNC: 7 MG/DL (ref 6–20)
CALCIUM SERPL-MCNC: 9.1 MG/DL (ref 8.7–10.5)
CHLORIDE SERPL-SCNC: 106 MMOL/L (ref 95–110)
CO2 SERPL-SCNC: 24 MMOL/L (ref 23–29)
CORTIS SERPL-MCNC: 3.5 UG/DL
CREAT SERPL-MCNC: 0.8 MG/DL (ref 0.5–1.4)
DIFFERENTIAL METHOD: ABNORMAL
EOSINOPHIL # BLD AUTO: 0.3 K/UL (ref 0–0.5)
EOSINOPHIL NFR BLD: 3.3 % (ref 0–8)
ERYTHROCYTE [DISTWIDTH] IN BLOOD BY AUTOMATED COUNT: 14.3 % (ref 11.5–14.5)
EST. GFR  (NO RACE VARIABLE): >60 ML/MIN/1.73 M^2
GLUCOSE SERPL-MCNC: 97 MG/DL (ref 70–110)
HCT VFR BLD AUTO: 47.8 % (ref 40–54)
HGB BLD-MCNC: 15.7 G/DL (ref 14–18)
HYPOCHROMIA BLD QL SMEAR: ABNORMAL
IMM GRANULOCYTES # BLD AUTO: 0.02 K/UL (ref 0–0.04)
IMM GRANULOCYTES NFR BLD AUTO: 0.3 % (ref 0–0.5)
LYMPHOCYTES # BLD AUTO: 2.2 K/UL (ref 1–4.8)
LYMPHOCYTES NFR BLD: 29 % (ref 18–48)
MCH RBC QN AUTO: 26.8 PG (ref 27–31)
MCHC RBC AUTO-ENTMCNC: 32.8 G/DL (ref 32–36)
MCV RBC AUTO: 82 FL (ref 82–98)
MONOCYTES # BLD AUTO: 0.7 K/UL (ref 0.3–1)
MONOCYTES NFR BLD: 8.9 % (ref 4–15)
NEUTROPHILS # BLD AUTO: 4.4 K/UL (ref 1.8–7.7)
NEUTROPHILS NFR BLD: 57.6 % (ref 38–73)
NRBC BLD-RTO: 0 /100 WBC
OVALOCYTES BLD QL SMEAR: ABNORMAL
PLATELET # BLD AUTO: 264 K/UL (ref 150–450)
PMV BLD AUTO: 11.4 FL (ref 9.2–12.9)
POIKILOCYTOSIS BLD QL SMEAR: SLIGHT
POLYCHROMASIA BLD QL SMEAR: ABNORMAL
POTASSIUM SERPL-SCNC: 3.9 MMOL/L (ref 3.5–5.1)
PROT SERPL-MCNC: 7.2 G/DL (ref 6–8.4)
RBC # BLD AUTO: 5.85 M/UL (ref 4.6–6.2)
SODIUM SERPL-SCNC: 140 MMOL/L (ref 136–145)
WBC # BLD AUTO: 7.56 K/UL (ref 3.9–12.7)

## 2022-09-05 PROCEDURE — 95813 EEG EXTND MNTR 61-119 MIN: CPT | Mod: 26,,, | Performed by: PSYCHIATRY & NEUROLOGY

## 2022-09-05 PROCEDURE — 99226 PR SUBSEQUENT OBSERVATION CARE,LEVEL III: ICD-10-PCS | Mod: ,,,

## 2022-09-05 PROCEDURE — 85025 COMPLETE CBC W/AUTO DIFF WBC: CPT

## 2022-09-05 PROCEDURE — G0378 HOSPITAL OBSERVATION PER HR: HCPCS

## 2022-09-05 PROCEDURE — 80053 COMPREHEN METABOLIC PANEL: CPT

## 2022-09-05 PROCEDURE — 25000003 PHARM REV CODE 250

## 2022-09-05 PROCEDURE — 63600175 PHARM REV CODE 636 W HCPCS: Performed by: NURSE PRACTITIONER

## 2022-09-05 PROCEDURE — 36415 COLL VENOUS BLD VENIPUNCTURE: CPT

## 2022-09-05 PROCEDURE — 25000003 PHARM REV CODE 250: Performed by: PHYSICIAN ASSISTANT

## 2022-09-05 PROCEDURE — 95813 PR EEG, EXTENDED, 61-119 MINS: ICD-10-PCS | Mod: 26,,, | Performed by: PSYCHIATRY & NEUROLOGY

## 2022-09-05 PROCEDURE — 82533 TOTAL CORTISOL: CPT

## 2022-09-05 PROCEDURE — 96376 TX/PRO/DX INJ SAME DRUG ADON: CPT

## 2022-09-05 PROCEDURE — 99226 PR SUBSEQUENT OBSERVATION CARE,LEVEL III: CPT | Mod: ,,,

## 2022-09-05 RX ORDER — LISINOPRIL 20 MG/1
40 TABLET ORAL DAILY
Status: DISCONTINUED | OUTPATIENT
Start: 2022-09-06 | End: 2022-09-06 | Stop reason: HOSPADM

## 2022-09-05 RX ORDER — NIFEDIPINE 60 MG/1
60 TABLET, EXTENDED RELEASE ORAL NIGHTLY
Status: DISCONTINUED | OUTPATIENT
Start: 2022-09-05 | End: 2022-09-06 | Stop reason: HOSPADM

## 2022-09-05 RX ORDER — LABETALOL HCL 20 MG/4 ML
10 SYRINGE (ML) INTRAVENOUS ONCE
Status: COMPLETED | OUTPATIENT
Start: 2022-09-05 | End: 2022-09-05

## 2022-09-05 RX ORDER — LISINOPRIL 20 MG/1
20 TABLET ORAL ONCE
Status: COMPLETED | OUTPATIENT
Start: 2022-09-05 | End: 2022-09-05

## 2022-09-05 RX ORDER — CARVEDILOL 6.25 MG/1
6.25 TABLET ORAL 2 TIMES DAILY
Status: DISCONTINUED | OUTPATIENT
Start: 2022-09-05 | End: 2022-09-06 | Stop reason: HOSPADM

## 2022-09-05 RX ADMIN — NIFEDIPINE 60 MG: 60 TABLET, FILM COATED, EXTENDED RELEASE ORAL at 08:09

## 2022-09-05 RX ADMIN — CARVEDILOL 6.25 MG: 6.25 TABLET, FILM COATED ORAL at 08:09

## 2022-09-05 RX ADMIN — LISINOPRIL 20 MG: 20 TABLET ORAL at 09:09

## 2022-09-05 RX ADMIN — LABETALOL HYDROCHLORIDE 10 MG: 5 INJECTION, SOLUTION INTRAVENOUS at 02:09

## 2022-09-05 RX ADMIN — HYDRALAZINE HYDROCHLORIDE 10 MG: 20 INJECTION, SOLUTION INTRAMUSCULAR; INTRAVENOUS at 12:09

## 2022-09-05 RX ADMIN — LISINOPRIL 20 MG: 20 TABLET ORAL at 12:09

## 2022-09-05 NOTE — PROCEDURES
EEG    Date/Time: 9/2/2022 7:04 AM  Performed by: Jayden Pantjoa MD  Authorized by: Daja Mann PA-C     EXTENDED  ELECTROENCEPHALOGRAM  REPORT    DATE OF SERVICE: 9/4/22  EEG NUMBER: [id number]  REQUESTED BY:  Mackenzie  LOCATION OF SERVICE:  [facility]    METHODOLOGY   Electroencephalographic (EEG) recording is with electrodes placed according to the International 10-20 placement system.  Thirty two (32) channels of digital signal (sampling rate of 512/sec) including T1 and T2 was simultaneously recorded from the scalp and may include  EKG, EMG, and/or eye monitors.  Recording band pass was 0.1 to 512 hz.  Digital video recording of the patient is simultaneously recorded with the EEG.  The patient is instructed report clinical symptoms which may occur during the recording session.  EEG and video recording is stored and archived in digital format.  Activation procedures which include photic stimulation, hyperventilation and instructing patients to perform simple task are done in selected patients.   The EEG is displayed on a monitor screen and can be reviewed using different montages.  Computer assisted analysis is employed to detect spike and electrographic seizure activity.   The entire record is submitted for computer analysis.  The entire recording is visually reviewed and the times identified by computer analysis as being spikes or seizures are reviewed again.  Compresses spectral analysis (CSA) is also performed on the activity recorded from each individual channel.  This is displayed as a power display of frequencies from 0 to 30 Hz over time.   The CSA is reviewed looking for asymmetries in power between homologous areas of the scalp and then compared with the original EEG recording.     [employer] software was also utilized in the review of this study.  This software suite analyzes the EEG recording in multiple domains.  Coherence and rhythmicity is computed to identify EEG sections which may contain organized  seizures.  Each channel undergoes analysis to detect presence of spike and sharp waves which have special and morphological characteristic of epileptic activity.  The routine EEG recording is converted from spacial into frequency domain.  This is then displayed comparing homologous areas to identify areas of significant asymmetry.  Algorithm to identify non-cortically generated artifact is used to separate eye movement, EMG and other artifact from the EEG.      RECORDING TIMES  Start on 9/4/22 at 15:06:53   Stop on 9/4/22  at 17:02:07     A total of  1  hrs and 49  min of EEG recording was obtained.    EEG FINDINGS  INTERICTAL:  The record shows a good  organization at rest, consisting of a 10-11 Hz posterior dominant rhythm with good  reactivity. There is mild bilateral beta activity.    Drowsiness is characterized by attenuation of the background, vertex waves, and bilateral theta slowing.     Provocative maneuvers including hyperventilation and photic stimulation were  not performed.     EKG recording shows a sinus rhythm.    There is no push button or clinical event.    IMPRESSION:  Normal study.    Jayden Pantoja MD

## 2022-09-05 NOTE — PLAN OF CARE
Problem: Adult Inpatient Plan of Care  Goal: Plan of Care Review  Outcome: Ongoing, Progressing  Goal: Patient-Specific Goal (Individualized)  Outcome: Ongoing, Progressing  Goal: Absence of Hospital-Acquired Illness or Injury  Outcome: Ongoing, Progressing  Goal: Optimal Comfort and Wellbeing  Outcome: Ongoing, Progressing  Goal: Readiness for Transition of Care  Outcome: Ongoing, Progressing     Problem: Infection  Goal: Absence of Infection Signs and Symptoms  Outcome: Ongoing, Progressing     Problem: Hypertension Acute  Goal: Blood Pressure Within Desired Range  Outcome: Ongoing, Progressing     Problem: Syncope  Goal: Absence of Syncopal Symptoms  Outcome: Ongoing, Progressing   Pt resting in room, no complaints voiced, pt educated on medication changes, call light in reach

## 2022-09-05 NOTE — ASSESSMENT & PLAN NOTE
- BP currently not well-controlled  - patient reports that the often takes his BP medications, but amlodipine rx has been  in 2022 - unsure of compliance  - resume home regimen of amlodipine 10 mg daily   - add lisinopril 10 mg qam, give amlodipine 10 mg qhs   - still hypertensive after 10 mg lisinopril, will increase daily dose to 20 mg daily  - due to persistent hypertension requiring IV antihypertensives every night since admission, will adjust regimen as follows: nifedipine 60 mg qhs, lisinopril 40 mg daily, and carvedilol 6.125 mg BID  - will continue to monitor and further titrate antihypertensives as clinically indicated

## 2022-09-05 NOTE — PROGRESS NOTES
[facility] - Telemetry Stepdown ([address] Medicine  Progress Note    Patient Name: Reji Pond Jr.  MRN: [id number]  Patient Class: OP- Observation   Admission Date: 2022  Length of Stay: 0 days  Attending Physician: Stevenson Girard MD  Primary Care Provider: Deangelo Rasheed MD        Subjective:     Principal Problem:HTN (hypertension)        HPI:  Reji Pond Jr. is a 36 y.o. male with a past medical history of HTN who presented to the ED this morning s/p LOC at home. Patient states that he woke up at 4 AM to get ready for work, was urinating and then woke up on the floor. His wife told him that she heard a loud sound, came into the bathroom and found him shaking and unconscious. Patient states that he hit is abdomen on the sink next to the toilet and hit his head on a carpeted surface. He now is experiencing head pressure and left sided abdominal pain. Patient denies any prodromal symptoms prior to losing consciousness. After regaining consciousness, he denied any changes in mental status. Denies hx of seizures. He stated he watched football last night and went to bed shortly after, denied any alcohol or drug consumption. Pt denies any chest pain or shortness of breath.       Patient states he takes amlodipine for his HTN regularly, unsure of compliance as recent rx is . Has known liver hemangioma. He works a strenuous job with long hours and has multiple children and endorses recent stress. Family history includes younger brother with seizures, biological father  from heart attack, maternal grandfather  from heart attack, and two children with unspecified heart murmurs.     ED: Hypertensive up to 210/114, all other VSS, afebrile. Troponin 1: 0.051, troponin 2: 0.049. D-dimer 0.81. UA negative. UDS negative. CTH: No acute intracranial hemorrhage/injury. CTA: No acute abnormality of the chest, specifically, no evidence of pulmonary thromboembolism. X-ray Ribs: No  displaced left-sided rib fracture or associated complication identified in the chest. Given a dose of 325 mg ASA and placed in observation.      Overview/Hospital Course:  Patient placed in observation due to syncope. Troponin elevated at 0.051 --> 0.049. No ST changes on EKG. Echo within normal limits. Cardiology consulted due to elevated troponin in setting of syncope, recommend controlling BP and ruling out neurological causes. EEG ordered, normal study results, no neurology consult placed. Bilateral carotid US and renal artery US ordered, no evidence of renal artery stenosis, pending bilateral carotid US. Labs to rule out secondary causes of HTN pending. Modified HTN medications to include lisinopril 40 mg daily, nifedipine 60 mg qhs and carvedilol 6.125 mg BID. Discontinued amlodipine.       Interval History: Patient seen and examined today. Patient hypertensive overnight requiring IV hydralazine and IV labetalol. Additional one time dose of 20 mg lisinopril administered in the early afternoon. Pt still hypertensive after morning medications. HTN medication regimen altered to include carvedilol 6.25 mg twice daily, nifedipine 60 mg qPM and lisinopril 40 mg qAM. Will monitor patient overnight. Pt expressed concern about white coat hypertension and several external stressors affecting his blood pressure. Discussed need for monitoring blood pressures daily at home with personal blood pressure cuff.       Review of Systems   Constitutional:  Negative for activity change and fatigue.   HENT:  Negative for facial swelling and sore throat.    Respiratory:  Negative for chest tightness and shortness of breath.    Cardiovascular:  Negative for chest pain and palpitations.   Gastrointestinal:  Negative for abdominal pain and nausea.   Genitourinary:  Negative for decreased urine volume and dysuria.   Musculoskeletal:  Negative for arthralgias and myalgias.   Skin:  Negative for rash and wound.   Neurological:  Negative  for seizures and light-headedness.   Objective:     Vital Signs (Most Recent):  Temp: 98.6 °F (37 °C) (09/05/22 1155)  Pulse: 82 (09/05/22 1155)  Resp: 19 (09/05/22 1155)  BP: (!) 162/95 (09/05/22 1155)  SpO2: 97 % (09/05/22 1155)   Vital Signs (24h Range):  Temp:  [97 °F (36.1 °C)-98.6 °F (37 °C)] 98.6 °F (37 °C)  Pulse:  [65-83] 82  Resp:  [18-19] 19  SpO2:  [97 %-100 %] 97 %  BP: (141-183)/() 162/95     Weight: 102.5 kg (225 lb 15.5 oz)  Body mass index is 30.65 kg/m².  No intake or output data in the 24 hours ending 09/05/22 1431   Physical Exam  Constitutional:       Appearance: Normal appearance.   HENT:      Head: Normocephalic and atraumatic.      Nose: Nose normal.   Eyes:      Extraocular Movements: Extraocular movements intact.   Cardiovascular:      Rate and Rhythm: Normal rate and regular rhythm.      Heart sounds: Murmur heard.   Pulmonary:      Effort: Pulmonary effort is normal.      Breath sounds: Normal breath sounds.   Abdominal:      General: Abdomen is flat. Bowel sounds are normal.      Palpations: Abdomen is soft.   Musculoskeletal:         General: Normal range of motion.      Cervical back: Normal range of motion.   Skin:     General: Skin is warm.   Neurological:      General: No focal deficit present.      Mental Status: He is alert and oriented to person, place, and time.   Psychiatric:         Mood and Affect: Mood normal.         Behavior: Behavior normal.       Significant Labs: All pertinent labs within the past 24 hours have been reviewed.  CBC:   Recent Labs   Lab 09/04/22 0427 09/05/22 0416   WBC 8.00 7.56   HGB 15.7 15.7   HCT 46.5 47.8    264     CMP:   Recent Labs   Lab 09/04/22 0427 09/05/22 0416    140   K 3.4* 3.9    106   CO2 26 24   GLU 99 97   BUN 10 7   CREATININE 0.7 0.8   CALCIUM 9.0 9.1   PROT 7.1 7.2   ALBUMIN 3.7 3.8   BILITOT 0.4 0.2   ALKPHOS 89 98   AST 16 16   ALT 26 28   ANIONGAP 8 10       Significant Imaging: I have reviewed all  pertinent imaging results/findings within the past 24 hours.      Assessment/Plan:      * HTN (hypertension)  - BP currently not well-controlled  - patient reports that the often takes his BP medications, but amlodipine rx has been  in 2022 - unsure of compliance  - resume home regimen of amlodipine 10 mg daily   - add lisinopril 10 mg qam, give amlodipine 10 mg qhs   - still hypertensive after 10 mg lisinopril, will increase daily dose to 20 mg daily  - due to persistent hypertension requiring IV antihypertensives every night since admission, will adjust regimen as follows: nifedipine 60 mg qhs, lisinopril 40 mg daily, and carvedilol 6.125 mg BID  - will continue to monitor and further titrate antihypertensives as clinically indicated        Syncope  - interval history and physical exam findings as described above  - hypertensive upon arrival to the ED, otherwise VSSAF  - denies CP, SOB, dizziness  - CTA without evidence of PE  - CTH without acute findings  - no previous episodes, currently asymptomatic  - no previous EKG on file, EKG in the ED with Normal sinus rhythm and ST and T wave abnormality  - orthostatic vitals ordered, negative  - echo ordered and is within normal limits, see results below  - lipid panel, A1c ordered  - EEG negative for seizure activity  - consulted cardiology due to elevated troponins in setting of syncopal episode, low suspicion for cardiac etiology, recommend neurological work-up  - will continue to monitor on tele    Results for orders placed during the hospital encounter of 22    Echo    Interpretation Summary  · The left ventricle is normal in size with normal systolic function. The estimated ejection fraction is 65%.  · Normal right ventricular size with normal right ventricular systolic function.  · Normal left ventricular diastolic function.  · Normal central venous pressure (3 mmHg).          VTE Risk Mitigation (From admission, onward)         Ordered     IP  VTE LOW RISK PATIENT  Once         09/02/22 1057                Discharge Planning   TYLOR: 9/5/2022     Code Status: Full Code   Is the patient medically ready for discharge?: No    Reason for patient still in hospital (select all that apply): Patient trending condition, Laboratory test and Treatment           Discussed patient's plan of care with YOVANY OlmedoC  Department of Hospital Medicine   [facility] - Telemetry Stepdown ([place]-)

## 2022-09-05 NOTE — PLAN OF CARE
Pt to dc home, needs BP cuffs which are out of pocket expense, PCP in a week, will leave message with CM/Sw for tower floor in the am. Daja GARCIA updated.

## 2022-09-05 NOTE — PLAN OF CARE
Problem: Adult Inpatient Plan of Care  Goal: Plan of Care Review  Outcome: Ongoing, Progressing     Problem: Hypertension Acute  Goal: Blood Pressure Within Desired Range  Outcome: Ongoing, Progressing     Problem: Syncope  Goal: Absence of Syncopal Symptoms  Outcome: Ongoing, Progressing

## 2022-09-05 NOTE — ASSESSMENT & PLAN NOTE
- interval history and physical exam findings as described above  - hypertensive upon arrival to the ED, otherwise VSSAF  - denies CP, SOB, dizziness  - CTA without evidence of PE  - CTH without acute findings  - no previous episodes, currently asymptomatic  - no previous EKG on file, EKG in the ED with Normal sinus rhythm and ST and T wave abnormality  - orthostatic vitals ordered, negative  - echo ordered and is within normal limits, see results below  - lipid panel, A1c ordered  - EEG negative for seizure activity  - consulted cardiology due to elevated troponins in setting of syncopal episode, low suspicion for cardiac etiology, recommend neurological work-up  - will continue to monitor on tele    Results for orders placed during the hospital encounter of 09/02/22    Echo    Interpretation Summary  · The left ventricle is normal in size with normal systolic function. The estimated ejection fraction is 65%.  · Normal right ventricular size with normal right ventricular systolic function.  · Normal left ventricular diastolic function.  · Normal central venous pressure (3 mmHg).

## 2022-09-05 NOTE — SUBJECTIVE & OBJECTIVE
Interval History: Patient seen and examined today. Patient hypertensive overnight requiring IV hydralazine and IV labetalol. Additional one time dose of 20 mg lisinopril administered in the early afternoon. Pt still hypertensive after morning medications. HTN medication regimen altered to include carvedilol 6.25 mg twice daily, nifedipine 60 mg qPM and lisinopril 40 mg qAM. Will monitor patient overnight. Pt expressed concern about white coat hypertension and several external stressors affecting his blood pressure. Discussed need for monitoring blood pressures daily at home with personal blood pressure cuff.       Review of Systems   Constitutional:  Negative for activity change and fatigue.   HENT:  Negative for facial swelling and sore throat.    Respiratory:  Negative for chest tightness and shortness of breath.    Cardiovascular:  Negative for chest pain and palpitations.   Gastrointestinal:  Negative for abdominal pain and nausea.   Genitourinary:  Negative for decreased urine volume and dysuria.   Musculoskeletal:  Negative for arthralgias and myalgias.   Skin:  Negative for rash and wound.   Neurological:  Negative for seizures and light-headedness.   Objective:     Vital Signs (Most Recent):  Temp: 98.6 °F (37 °C) (09/05/22 1155)  Pulse: 82 (09/05/22 1155)  Resp: 19 (09/05/22 1155)  BP: (!) 162/95 (09/05/22 1155)  SpO2: 97 % (09/05/22 1155)   Vital Signs (24h Range):  Temp:  [97 °F (36.1 °C)-98.6 °F (37 °C)] 98.6 °F (37 °C)  Pulse:  [65-83] 82  Resp:  [18-19] 19  SpO2:  [97 %-100 %] 97 %  BP: (141-183)/() 162/95     Weight: 102.5 kg (225 lb 15.5 oz)  Body mass index is 30.65 kg/m².  No intake or output data in the 24 hours ending 09/05/22 1431   Physical Exam  Constitutional:       Appearance: Normal appearance.   HENT:      Head: Normocephalic and atraumatic.      Nose: Nose normal.   Eyes:      Extraocular Movements: Extraocular movements intact.   Cardiovascular:      Rate and Rhythm: Normal rate  and regular rhythm.      Heart sounds: Murmur heard.   Pulmonary:      Effort: Pulmonary effort is normal.      Breath sounds: Normal breath sounds.   Abdominal:      General: Abdomen is flat. Bowel sounds are normal.      Palpations: Abdomen is soft.   Musculoskeletal:         General: Normal range of motion.      Cervical back: Normal range of motion.   Skin:     General: Skin is warm.   Neurological:      General: No focal deficit present.      Mental Status: He is alert and oriented to person, place, and time.   Psychiatric:         Mood and Affect: Mood normal.         Behavior: Behavior normal.       Significant Labs: All pertinent labs within the past 24 hours have been reviewed.  CBC:   Recent Labs   Lab 09/04/22 0427 09/05/22 0416   WBC 8.00 7.56   HGB 15.7 15.7   HCT 46.5 47.8    264     CMP:   Recent Labs   Lab 09/04/22 0427 09/05/22 0416    140   K 3.4* 3.9    106   CO2 26 24   GLU 99 97   BUN 10 7   CREATININE 0.7 0.8   CALCIUM 9.0 9.1   PROT 7.1 7.2   ALBUMIN 3.7 3.8   BILITOT 0.4 0.2   ALKPHOS 89 98   AST 16 16   ALT 26 28   ANIONGAP 8 10       Significant Imaging: I have reviewed all pertinent imaging results/findings within the past 24 hours.

## 2022-09-06 VITALS
HEART RATE: 82 BPM | DIASTOLIC BLOOD PRESSURE: 89 MMHG | SYSTOLIC BLOOD PRESSURE: 131 MMHG | OXYGEN SATURATION: 98 % | HEIGHT: 72 IN | BODY MASS INDEX: 30.61 KG/M2 | RESPIRATION RATE: 18 BRPM | WEIGHT: 226 LBS | TEMPERATURE: 98 F

## 2022-09-06 PROCEDURE — 99217 PR OBSERVATION CARE DISCHARGE: ICD-10-PCS | Mod: ,,,

## 2022-09-06 PROCEDURE — G0378 HOSPITAL OBSERVATION PER HR: HCPCS

## 2022-09-06 PROCEDURE — 99217 PR OBSERVATION CARE DISCHARGE: CPT | Mod: ,,,

## 2022-09-06 PROCEDURE — 25000003 PHARM REV CODE 250

## 2022-09-06 RX ORDER — LISINOPRIL 40 MG/1
40 TABLET ORAL DAILY
Qty: 30 TABLET | Refills: 11 | Status: SHIPPED | OUTPATIENT
Start: 2022-09-07 | End: 2022-12-01

## 2022-09-06 RX ORDER — CARVEDILOL 6.25 MG/1
6.25 TABLET ORAL 2 TIMES DAILY
Qty: 60 TABLET | Refills: 11 | Status: SHIPPED | OUTPATIENT
Start: 2022-09-06 | End: 2023-09-06

## 2022-09-06 RX ORDER — NIFEDIPINE 60 MG/1
60 TABLET, EXTENDED RELEASE ORAL NIGHTLY
Qty: 30 TABLET | Refills: 11 | Status: SHIPPED | OUTPATIENT
Start: 2022-09-06 | End: 2023-09-06

## 2022-09-06 RX ADMIN — CARVEDILOL 6.25 MG: 6.25 TABLET, FILM COATED ORAL at 08:09

## 2022-09-06 RX ADMIN — LISINOPRIL 40 MG: 20 TABLET ORAL at 08:09

## 2022-09-06 NOTE — PLAN OF CARE
Problem: Adult Inpatient Plan of Care  Goal: Plan of Care Review  Outcome: Ongoing, Progressing     Problem: Infection  Goal: Absence of Infection Signs and Symptoms  Outcome: Ongoing, Progressing     Problem: Syncope  Goal: Absence of Syncopal Symptoms  Outcome: Ongoing, Progressing

## 2022-09-06 NOTE — CARE UPDATE
09/06/2022      Reji Pond Jr.  [address]  [facility] [address]          Hospital Medicine Dept.  [facility] [address] [place] [id number] [phone] after hours  [phone] fax Reji Castaneda Salty Gallagher has been hospitalized at the [facility] since 9/2/2022.  Please excuse the patient from duties.  Patient may return on 9/9/22.  No restrictions.     Please contact me if you have any questions.                  __________________________  Daja Mann PA-C  09/06/2022

## 2022-09-06 NOTE — PLAN OF CARE
[facility] - Telemetry Stepdown ([place]-)  Initial Discharge Assessment       Primary Care Provider: Deangelo Rasheed MD    Admission Diagnosis: Syncope [R55]  Rib pain [R07.81]  Elevated troponin [R77.8]  Chest pain [R07.9]  Hypertension, unspecified type [I10]    Admission Date: 9/2/2022  Expected Discharge Date: 9/6/2022         Payor: [employer] / Plan: [facility] CHOICE PLUS / Product Type: Commercial /     Extended Emergency Contact Information  Primary Emergency Contact: Puja Nelson   [place]  Mobile Phone: [phone]  Relation: Significant other    Discharge Plan A: (P) Home with family  Discharge Plan B: (P) Home with family      [id number] - [place], [place] - [address] AT [facility] [address]  [address]  [facility] [address]  Phone: [phone] Fax: [phone]             SW completed Discharge Planning Assessment with patient via bedside. Discharge planning booklet given to patient/family and whiteboard updated with TYLOR and phone #. All questions answered.    Patient reported that he will have transportation upon discharge.     Patient reported that he lives at home with his girlfriend and children. Patient reported that prior to hospitalization he was independent with his ADL's. Patient reported that he is not on dialysis and does not go to Coumadin clinic.     Patient lives in a [facility] with 0 steps to enter.       Stefanie Babcock LMSW  [facility]  Ext. [phone]

## 2022-09-07 NOTE — PLAN OF CARE
[facility] - Telemetry Stepdown ([facility])  Discharge Final Note    Primary Care Provider: Deangelo Rasheed MD    Expected Discharge Date: 9/6/2022    Patient discharged to home via personal transportation.     Patient's bedside nurse and patient notified of the above.      Final Discharge Note (most recent)       Final Note - 09/07/22 1223          Final Note    Assessment Type Final Discharge Note (P)      Anticipated Discharge Disposition Home or Self Care (P)         Post-Acute Status    Post-Acute Authorization Other (P)      Other Status No Post-Acute Service Needs (P)                      Important Message from Medicare                 Future Appointments   Date Time Provider Department Center   9/13/2022  9:30 AM Summer Belcher NP [facility]       SW scheduled post-discharge follow-up appointment and information added to AVS.     Stefanie Babcock LMSW  [facility]  Ext. [phone]

## 2022-09-08 LAB — ALDOST SERPL-MCNC: 13.5 NG/DL

## 2022-09-09 LAB
ALDOST SERPL-MCNC: 14.8 NG/DL
ALDOST/RENIN PLAS-RTO: 14.8 RATIO
RENIN PLAS-CCNC: 1 NG/ML/HR

## 2022-09-13 ENCOUNTER — OFFICE VISIT (OUTPATIENT)
Dept: INTERNAL MEDICINE | Facility: CLINIC | Age: 37
End: 2022-09-13
Payer: COMMERCIAL

## 2022-09-13 VITALS
HEART RATE: 60 BPM | WEIGHT: 227.5 LBS | SYSTOLIC BLOOD PRESSURE: 120 MMHG | HEIGHT: 72 IN | DIASTOLIC BLOOD PRESSURE: 86 MMHG | OXYGEN SATURATION: 98 % | BODY MASS INDEX: 30.81 KG/M2

## 2022-09-13 DIAGNOSIS — D18.03 HEMANGIOMA OF LIVER: ICD-10-CM

## 2022-09-13 DIAGNOSIS — Z00.00 ANNUAL PHYSICAL EXAM: Primary | ICD-10-CM

## 2022-09-13 DIAGNOSIS — K04.7 TOOTH ABSCESS: ICD-10-CM

## 2022-09-13 DIAGNOSIS — E78.1 HYPERTRIGLYCERIDEMIA: ICD-10-CM

## 2022-09-13 DIAGNOSIS — E66.09 CLASS 1 OBESITY DUE TO EXCESS CALORIES WITHOUT SERIOUS COMORBIDITY WITH BODY MASS INDEX (BMI) OF 30.0 TO 30.9 IN ADULT: ICD-10-CM

## 2022-09-13 DIAGNOSIS — K76.0 FATTY LIVER: ICD-10-CM

## 2022-09-13 DIAGNOSIS — R55 SYNCOPE, UNSPECIFIED SYNCOPE TYPE: ICD-10-CM

## 2022-09-13 DIAGNOSIS — I10 HYPERTENSION, UNSPECIFIED TYPE: ICD-10-CM

## 2022-09-13 PROCEDURE — 1160F RVW MEDS BY RX/DR IN RCRD: CPT | Mod: CPTII,S$GLB,, | Performed by: NURSE PRACTITIONER

## 2022-09-13 PROCEDURE — 1159F MED LIST DOCD IN RCRD: CPT | Mod: CPTII,S$GLB,, | Performed by: NURSE PRACTITIONER

## 2022-09-13 PROCEDURE — 3079F DIAST BP 80-89 MM HG: CPT | Mod: CPTII,S$GLB,, | Performed by: NURSE PRACTITIONER

## 2022-09-13 PROCEDURE — 3079F PR MOST RECENT DIASTOLIC BLOOD PRESSURE 80-89 MM HG: ICD-10-PCS | Mod: CPTII,S$GLB,, | Performed by: NURSE PRACTITIONER

## 2022-09-13 PROCEDURE — 99214 PR OFFICE/OUTPT VISIT, EST, LEVL IV, 30-39 MIN: ICD-10-PCS | Mod: S$GLB,,, | Performed by: NURSE PRACTITIONER

## 2022-09-13 PROCEDURE — 3008F BODY MASS INDEX DOCD: CPT | Mod: CPTII,S$GLB,, | Performed by: NURSE PRACTITIONER

## 2022-09-13 PROCEDURE — 1160F PR REVIEW ALL MEDS BY PRESCRIBER/CLIN PHARMACIST DOCUMENTED: ICD-10-PCS | Mod: CPTII,S$GLB,, | Performed by: NURSE PRACTITIONER

## 2022-09-13 PROCEDURE — 99999 PR PBB SHADOW E&M-EST. PATIENT-LVL IV: ICD-10-PCS | Mod: PBBFAC,,, | Performed by: NURSE PRACTITIONER

## 2022-09-13 PROCEDURE — 4010F PR ACE/ARB THEARPY RXD/TAKEN: ICD-10-PCS | Mod: CPTII,S$GLB,, | Performed by: NURSE PRACTITIONER

## 2022-09-13 PROCEDURE — 4010F ACE/ARB THERAPY RXD/TAKEN: CPT | Mod: CPTII,S$GLB,, | Performed by: NURSE PRACTITIONER

## 2022-09-13 PROCEDURE — 3044F HG A1C LEVEL LT 7.0%: CPT | Mod: CPTII,S$GLB,, | Performed by: NURSE PRACTITIONER

## 2022-09-13 PROCEDURE — 3074F PR MOST RECENT SYSTOLIC BLOOD PRESSURE < 130 MM HG: ICD-10-PCS | Mod: CPTII,S$GLB,, | Performed by: NURSE PRACTITIONER

## 2022-09-13 PROCEDURE — 3074F SYST BP LT 130 MM HG: CPT | Mod: CPTII,S$GLB,, | Performed by: NURSE PRACTITIONER

## 2022-09-13 PROCEDURE — 3044F PR MOST RECENT HEMOGLOBIN A1C LEVEL <7.0%: ICD-10-PCS | Mod: CPTII,S$GLB,, | Performed by: NURSE PRACTITIONER

## 2022-09-13 PROCEDURE — 99214 OFFICE O/P EST MOD 30 MIN: CPT | Mod: S$GLB,,, | Performed by: NURSE PRACTITIONER

## 2022-09-13 PROCEDURE — 3008F PR BODY MASS INDEX (BMI) DOCUMENTED: ICD-10-PCS | Mod: CPTII,S$GLB,, | Performed by: NURSE PRACTITIONER

## 2022-09-13 PROCEDURE — 99999 PR PBB SHADOW E&M-EST. PATIENT-LVL IV: CPT | Mod: PBBFAC,,, | Performed by: NURSE PRACTITIONER

## 2022-09-13 PROCEDURE — 1159F PR MEDICATION LIST DOCUMENTED IN MEDICAL RECORD: ICD-10-PCS | Mod: CPTII,S$GLB,, | Performed by: NURSE PRACTITIONER

## 2022-09-13 RX ORDER — AMOXICILLIN 500 MG/1
500 TABLET, FILM COATED ORAL EVERY 12 HOURS
Qty: 14 TABLET | Refills: 0 | Status: SHIPPED | OUTPATIENT
Start: 2022-09-13 | End: 2022-09-20

## 2022-09-13 NOTE — LETTER
September 13, 2022      [facility]  [address]  [facility] [address]  Phone: [phone]  Fax: [phone]       Patient: Reji Pond   YOB: 1985  Date of Visit: 09/13/2022    To Whom It May Concern:    Hebert Pond  was at [facility] on 09/13/2022. The patient may return to work/school on Tuesday, September 13, 2022 with no restrictions. If you have any questions or concerns, or if I can be of further assistance, please do not hesitate to contact me.    Sincerely,    Summer Belcher NP

## 2022-09-13 NOTE — PROGRESS NOTES
INTERNAL MEDICINE PROGRESS NOTE    CHIEF COMPLAINT     Chief Complaint   Patient presents with    Hospital Follow Up       HPI     Reji Pond Jr. is a 36 y.o. male HTN, syncope who presents for a hospital follow up visit today.    Pt presented to the ED 9/2/2022 following syncope at home. Woke up and was getting ready for work, urinating, lost consciousness. His wife told him that she heard a loud sound, came into the bathroom and found him shaking and unconscious. Patient states that he hit is abdomen on the sink next to the toilet and hit his head on a carpeted surface. He now is experiencing head pressure and left sided abdominal pain. Patient denies any prodromal symptoms prior to losing consciousness. After regaining consciousness, he denied any changes in mental status. Denies hx of seizures. He stated he watched football last night and went to bed shortly after, denied any alcohol or drug consumption. Pt denies any chest pain or shortness of breath.     ED: Hypertensive up to 210/114, all other VSS, afebrile. Troponin 1: 0.051, troponin 2: 0.049. D-dimer 0.81. UA negative. UDS negative. CTH: No acute intracranial hemorrhage/injury. CTA: No acute abnormality of the chest, specifically, no evidence of pulmonary thromboembolism. X-ray Ribs: No displaced left-sided rib fracture or associated complication identified in the chest. Given a dose of 325 mg ASA and placed in observation.    Syncope-   CTA negative, CTH without acute finsigns   EKG NSR and ST and T wave abnormality   Echo The left ventricle is normal in size with normal systolic function. The estimated ejection fraction is 65%.  · Normal right ventricular size with normal right ventricular systolic function.  · Normal left ventricular diastolic function.  · Normal central venous pressure (3 mmHg  EEG- normal   US carotids- No evidence of a hemodynamically significant carotid bifurcation stenosis.  1-39% stenosis of the ICAs bilaterally    HTN-  taking amlodipine 10mg and added lisinopril 10mg   Adjusted to : nifedipine 60 mg qhs, lisinopril 40 mg daily, and carvedilol 6.125 mg BID  Renal u/s-    1. No evidence of renal artery stenosis.  2. Large hepatic cavernous hemangiomas.  3. Suspected hepatic steatosis       HM-  Flu- Needs - will defer         Past Medical History:  Past Medical History:   Diagnosis Date    Elevated liver function tests 10/25/2018    Fatty liver 10/25/2018    Hypertension     Kidney stones        Home Medications:  Prior to Admission medications    Medication Sig Start Date End Date Taking? Authorizing Provider   carvediloL (COREG) 6.25 MG tablet Take 1 tablet (6.25 mg total) by mouth 2 (two) times daily. 9/6/22 9/6/23  Daja Mann PA-C   lisinopriL (PRINIVIL,ZESTRIL) 40 MG tablet Take 1 tablet (40 mg total) by mouth once daily. in the morning 9/7/22 10/7/22  Daja Mann PA-C   NIFEdipine (PROCARDIA-XL) 60 MG (OSM) 24 hr tablet Take 1 tablet (60 mg total) by mouth every evening. 9/6/22 9/6/23  Daja Mann PA-C       Review of Systems:  Review of Systems   Constitutional:  Negative for chills, fatigue, fever and unexpected weight change.   HENT:  Negative for congestion, ear pain, hearing loss, postnasal drip and sinus pressure.    Eyes:  Negative for pain, redness and visual disturbance.   Respiratory:  Negative for cough and shortness of breath.    Cardiovascular:  Negative for chest pain and palpitations.   Gastrointestinal:  Negative for abdominal distention, abdominal pain, constipation, diarrhea, nausea and vomiting.   Endocrine: Negative for polydipsia, polyphagia and polyuria.   Genitourinary:  Negative for dysuria, frequency, hematuria and urgency.   Musculoskeletal:  Negative for arthralgias, gait problem and myalgias.   Skin:  Negative for pallor and rash.   Allergic/Immunologic: Negative for environmental allergies and immunocompromised state.   Neurological:  Positive for dizziness. Negative for weakness,  light-headedness and headaches.   Hematological:  Negative for adenopathy. Does not bruise/bleed easily.   Psychiatric/Behavioral:  Negative for behavioral problems, confusion and sleep disturbance. The patient is not nervous/anxious.      Health Maintainence:   Immunizations:  Health Maintenance         Date Due Completion Date    COVID-19 Vaccine (1) Never done ---    Pneumococcal Vaccines (Age 0-64) (1 - PCV) Never done ---    TETANUS VACCINE Never done ---    Influenza Vaccine (1) Never done ---    Lipid Panel 09/03/2027 9/3/2022             PHYSICAL EXAM     /86 (BP Location: Right arm, Patient Position: Sitting, BP Method: Medium (Manual))   Pulse 60   Ht 6' (1.829 m)   Wt 103.2 kg (227 lb 8.2 oz)   SpO2 98%   BMI 30.86 kg/m²     Physical Exam  Vitals reviewed.   Constitutional:       Appearance: He is well-developed.   HENT:      Head: Normocephalic.      Right Ear: External ear normal.      Left Ear: External ear normal.      Nose: Nose normal.      Mouth/Throat:      Pharynx: No oropharyngeal exudate.   Eyes:      Pupils: Pupils are equal, round, and reactive to light.   Neck:      Thyroid: No thyromegaly.      Vascular: No JVD.      Trachea: No tracheal deviation.   Cardiovascular:      Rate and Rhythm: Normal rate and regular rhythm.      Heart sounds: No murmur heard.    No friction rub. No gallop.   Pulmonary:      Effort: No respiratory distress.      Breath sounds: Normal breath sounds. No wheezing or rales.   Chest:      Chest wall: No tenderness.   Abdominal:      General: Bowel sounds are normal. There is no distension.      Palpations: Abdomen is soft.      Tenderness: There is no abdominal tenderness.   Musculoskeletal:         General: No tenderness. Normal range of motion.   Lymphadenopathy:      Cervical: No cervical adenopathy.   Skin:     General: Skin is warm and dry.      Findings: No rash.   Neurological:      Mental Status: He is alert and oriented to person, place, and time.    Psychiatric:         Behavior: Behavior normal.       LABS     Lab Results   Component Value Date    HGBA1C 5.3 09/03/2022     CMP  Sodium   Date Value Ref Range Status   09/05/2022 140 136 - 145 mmol/L Final     Potassium   Date Value Ref Range Status   09/05/2022 3.9 3.5 - 5.1 mmol/L Final     Chloride   Date Value Ref Range Status   09/05/2022 106 95 - 110 mmol/L Final     CO2   Date Value Ref Range Status   09/05/2022 24 23 - 29 mmol/L Final     Glucose   Date Value Ref Range Status   09/05/2022 97 70 - 110 mg/dL Final     BUN   Date Value Ref Range Status   09/05/2022 7 6 - 20 mg/dL Final     Creatinine   Date Value Ref Range Status   09/05/2022 0.8 0.5 - 1.4 mg/dL Final     Calcium   Date Value Ref Range Status   09/05/2022 9.1 8.7 - 10.5 mg/dL Final     Total Protein   Date Value Ref Range Status   09/05/2022 7.2 6.0 - 8.4 g/dL Final     Albumin   Date Value Ref Range Status   09/05/2022 3.8 3.5 - 5.2 g/dL Final     Total Bilirubin   Date Value Ref Range Status   09/05/2022 0.2 0.1 - 1.0 mg/dL Final     Comment:     For infants and newborns, interpretation of results should be based  on gestational age, weight and in agreement with clinical  observations.    Premature Infant recommended reference ranges:  Up to 24 hours.............<8.0 mg/dL  Up to 48 hours............<12.0 mg/dL  3-5 days..................<15.0 mg/dL  6-29 days.................<15.0 mg/dL       Alkaline Phosphatase   Date Value Ref Range Status   09/05/2022 98 55 - 135 U/L Final     AST   Date Value Ref Range Status   09/05/2022 16 10 - 40 U/L Final     ALT   Date Value Ref Range Status   09/05/2022 28 10 - 44 U/L Final     Anion Gap   Date Value Ref Range Status   09/05/2022 10 8 - 16 mmol/L Final     eGFR if    Date Value Ref Range Status   10/23/2018 >60.0 >60 mL/min/1.73 m^2 Final     eGFR if non    Date Value Ref Range Status   10/23/2018 >60.0 >60 mL/min/1.73 m^2 Final     Comment:     Calculation  used to obtain the estimated glomerular filtration  rate (eGFR) is the CKD-EPI equation.        Lab Results   Component Value Date    WBC 7.56 09/05/2022    HGB 15.7 09/05/2022    HCT 47.8 09/05/2022    MCV 82 09/05/2022     09/05/2022     Lab Results   Component Value Date    CHOL 213 (H) 09/03/2022     Lab Results   Component Value Date    HDL 28 (L) 09/03/2022     Lab Results   Component Value Date    LDLCALC 152.2 09/03/2022     Lab Results   Component Value Date    TRIG 164 (H) 09/03/2022     Lab Results   Component Value Date    CHOLHDL 13.1 (L) 09/03/2022     Lab Results   Component Value Date    TSH 1.319 09/02/2022       ASSESSMENT/PLAN     Reji Castaneda Salty Gallagher is a 36 y.o. male     Annual physical exam- All age and gender related screenings discussed     Hypertension, unspecified type- at goal. Will cont current meds.     Fatty liver- send for u/s of the liver   -     US Abdomen Limited; Future; Expected date: 09/13/2022    Hemangiomas of liver- will monitor   -     US Abdomen Limited; Future; Expected date: 09/13/2022    Hypertriglyceridemia- stable. Discussed diet and exercise to decrease blood sugar and triglycerides.     Class 1 obesity due to excess calories without serious comorbidity with body mass index (BMI) of 30.0 to 30.9 in adult- discussed diet and exercise for weight mgmt     Syncope, unspecified syncope type- stable. Reviewed labs and diagnostics performed as inpatient     Tooth abscess- smith tart amoxicillin bid x 1 week   -     amoxicillin (AMOXIL) 500 MG Tab; Take 1 tablet (500 mg total) by mouth every 12 (twelve) hours. for 7 days  Dispense: 14 tablet; Refill: 0         Follow up with PCP in 6 months  for follow up     Patient education provided from Fel[facility]. Patient was counseled on when and how to seek emergent care.       Summer JASSO, APRN, FNP-c   Department of Internal Medicine - [facility]  9:27 AM

## 2022-09-16 NOTE — DISCHARGE SUMMARY
[facility] - Telemetry Stepdown ([place] [address])  [facility] Medicine  Discharge Summary      Patient Name: Reji Pond Jr.  MRN: [id number]  Patient Class: OP- Observation  Admission Date: 2022  Hospital Length of Stay: 0 days  Discharge Date and Time: 2022  3:12 PM  Attending Physician: No att. providers found   Discharging Provider: Daja Mann PA-C  Primary Care Provider: Deangelo Rasheed MD  [facility] Medicine Team: [facility] [place] [facility] [place] Daja Mann PA-C    HPI:   Reji Pond Jr. is a 36 y.o. male with a past medical history of HTN who presented to the ED this morning s/p LOC at home. Patient states that he woke up at 4 AM to get ready for work, was urinating and then woke up on the floor. His wife told him that she heard a loud sound, came into the bathroom and found him shaking and unconscious. Patient states that he hit is abdomen on the sink next to the toilet and hit his head on a carpeted surface. He now is experiencing head pressure and left sided abdominal pain. Patient denies any prodromal symptoms prior to losing consciousness. After regaining consciousness, he denied any changes in mental status. Denies hx of seizures. He stated he watched football last night and went to bed shortly after, denied any alcohol or drug consumption. Pt denies any chest pain or shortness of breath.       Patient states he takes amlodipine for his HTN regularly, unsure of compliance as recent rx is . Has known liver hemangioma. He works a strenuous job with long hours and has multiple children and endorses recent stress. Family history includes younger brother with seizures, biological father  from heart attack, maternal grandfather  from heart attack, and two children with unspecified heart murmurs.     ED: Hypertensive up to 210/114, all other VSS, afebrile. Troponin 1: 0.051, troponin 2: 0.049. D-dimer 0.81. UA negative. UDS negative. CTH: No acute intracranial hemorrhage/injury. CTA: No  acute abnormality of the chest, specifically, no evidence of pulmonary thromboembolism. X-ray Ribs: No displaced left-sided rib fracture or associated complication identified in the chest. Given a dose of 325 mg ASA and placed in observation.      * No surgery found *      Hospital Course:   Patient placed in observation due to syncope. Troponin elevated at 0.051 --> 0.049. No ST changes on EKG. Echo within normal limits. Cardiology consulted due to elevated troponin in setting of syncope, recommend controlling BP and ruling out neurological causes. EEG ordered, normal study results, no neurology consult placed. Renal artery US ordered, no evidence of renal artery stenosis. Bilateral carotid US without evidence of stenosis. Labs to rule out secondary causes of HTN pending. Modified HTN medications to include lisinopril 40 mg daily, nifedipine 60 mg qhs and carvedilol 6.125 mg BID. Discontinued amlodipine. Blood pressure controlled with new regimen, patient stable for discharge. All medications delivered to bedside. Pt will purchase blood pressure cuff, monitor blood pressures twice daily at home and keep a log. To f/u with PCP on 9/12/22. Return precautions given, patient verbalized understanding, all questions answered.        Goals of Care Treatment Preferences:  Code Status: Full Code      Consults:   Consults (From admission, onward)        Status Ordering Provider     Inpatient consult to Cardiology  Once        Provider:  (Not yet assigned)    RAJNI Rubin          No new Assessment & Plan notes have been filed under this hospital service since the last note was generated.  Service: Hospital Medicine    There are no hospital problems to display for this patient.      Discharged Condition: good    Disposition: Home or Self Care    Follow Up:    Patient Instructions:      Diet Cardiac     Notify your health care provider if you experience any of the following:  severe persistent headache     Notify  "your health care provider if you experience any of the following:  persistent dizziness, light-headedness, or visual disturbances     Notify your health care provider if you experience any of the following:  severe uncontrolled pain     Activity as tolerated       Significant Diagnostic Studies: Cardiac Graphics: Echocardiogram:   Transthoracic echo (TTE) complete (Cupid Only):   Results for orders placed or performed during the hospital encounter of 09/02/22   Echo   Result Value Ref Range    Ascending aorta 2.78 cm    STJ 2.36 cm    AV mean gradient 4 mmHg    Ao peak blair 1.48 m/s    Ao VTI 26.83 cm    IVS 0.95 0.6 - 1.1 cm    LA size 4.05 cm    Left Atrium Major Axis 5.11 cm    Left Atrium Minor Axis 5.10 cm    LVIDd 5.12 3.5 - 6.0 cm    LVIDs 3.38 2.1 - 4.0 cm    LVOT diameter 2.01 cm    LVOT peak VTI 18.72 cm    Posterior Wall 1.00 0.6 - 1.1 cm    MV Peak A Blair 0.76 m/s    E wave deceleration time 206.72 msec    MV Peak E Blair 0.94 m/s    PV Peak D Blair 0.57 m/s    PV Peak S Blair 0.44 m/s    RA Major Axis 4.50 cm    RA Width 3.39 cm    RVDD 3.48 cm    Sinus 2.61 cm    TAPSE 1.88 cm    TDI LATERAL 0.10 m/s    TDI SEPTAL 0.08 m/s    LA WIDTH 4.31 cm    MV stenosis pressure 1/2 time 59.95 ms    LV Diastolic Volume 124.80 mL    LV Systolic Volume 46.89 mL    RV S' 10.73 cm/s    LVOT peak blair 0.87 m/s    LA volume (mod) 54.81 cm3    MV "A" wave duration 10.85 msec    LV LATERAL E/E' RATIO 9.40 m/s    LV SEPTAL E/E' RATIO 11.75 m/s    FS 34 %    LA volume 75.74 cm3    LV mass 182.95 g    Left Ventricle Relative Wall Thickness 0.39 cm    AV valve area 2.21 cm2    AV Velocity Ratio 0.59     AV index (prosthetic) 0.70     MV valve area p 1/2 method 3.67 cm2    E/A ratio 1.24     Mean e' 0.09 m/s    Pulm vein S/D ratio 0.77     LVOT area 3.2 cm2    LVOT stroke volume 59.37 cm3    AV peak gradient 9 mmHg    E/E' ratio 10.44 m/s    LV Systolic Volume Index 20.7 mL/m2    LV Diastolic Volume Index 55.22 mL/m2    LA Volume Index " 33.5 mL/m2    LV Mass Index 81 g/m2    LA Volume Index (Mod) 24.3 mL/m2    BSA 2.3 m2    Right Atrial Pressure (from IVC) 3 mmHg    EF 65 %    Narrative    · The left ventricle is normal in size with normal systolic function. The   estimated ejection fraction is 65%.  · Normal right ventricular size with normal right ventricular systolic   function.  · Normal left ventricular diastolic function.  · Normal central venous pressure (3 mmHg).          Pending Diagnostic Studies:     Procedure Component Value Units Date/Time    Metanephrines, Plasma Free [[id number]] Collected: 09/04/22 1723    Order Status: Sent Lab Status: In process Updated: 09/04/22 1740    Specimen: Blood          Medications:  Reconciled Home Medications:      Medication List      START taking these medications    carvediloL 6.25 MG tablet  Commonly known as: COREG  Take 1 tablet (6.25 mg total) by mouth 2 (two) times daily.     lisinopriL 40 MG tablet  Commonly known as: PRINIVIL,ZESTRIL  Take 1 tablet (40 mg total) by mouth once daily. in the morning     NIFEdipine 60 MG (OSM) 24 hr tablet  Commonly known as: PROCARDIA-XL  Take 1 tablet (60 mg total) by mouth every evening.        STOP taking these medications    amLODIPine 10 MG tablet  Commonly known as: NORVASC            Indwelling Lines/Drains at time of discharge:   Lines/Drains/Airways     Epidural Line  Duration                Perineural Analgesia/Anesthesia Assessment (using dermatomes) 07/28/20 1137 779 days                Time spent on the discharge of patient: 35 minutes    Discussed patient's plan of care with YOVANY OlmedoC  Department of Hospital Medicine  [facility] - Telemetry Stepdown ([place]-[facility])

## 2022-09-18 LAB
METANEPH FREE SERPL-MCNC: NORMAL PG/ML
METANEPHS SERPL-MCNC: 102 PG/ML
NORMETANEPH FREE SERPL-MCNC: 102 PG/ML

## 2022-12-15 ENCOUNTER — TELEPHONE (OUTPATIENT)
Dept: SPORTS MEDICINE | Facility: CLINIC | Age: 37
End: 2022-12-15
Payer: COMMERCIAL

## 2022-12-15 NOTE — TELEPHONE ENCOUNTER
Reji Pond is a patient of Dr. Santo Shepherd M.D. The patient has provided the responses to the Patient Questionnaire via telephone on 07Dec2022 and his Patient Reported Outcome Measures have been recorded.

## 2023-08-03 NOTE — PROGRESS NOTES
Physical Therapy    Patient did not show up for today's therapy session.  reported pt was having insurance issues but his voicemail was full when attempts were made to contact him. PT attempted to contact pt today but unable to leave voicemail also.     No Show: 6      Carlene House PT, DPT  10/6/2020         PAST SURGICAL HISTORY:  H/O lumbosacral spine surgery     S/P AVR Bioprosthetic    S/P hip replacement B/L    S/P right coronary artery (RCA) stent placement

## 2024-12-27 RX ORDER — NIFEDIPINE 60 MG/1
60 TABLET, EXTENDED RELEASE ORAL NIGHTLY
Qty: 30 TABLET | Refills: 11 | OUTPATIENT
Start: 2024-12-27 | End: 2025-12-27

## 2024-12-27 RX ORDER — CARVEDILOL 6.25 MG/1
6.25 TABLET ORAL 2 TIMES DAILY
Qty: 60 TABLET | Refills: 11 | OUTPATIENT
Start: 2024-12-27 | End: 2025-12-27

## 2024-12-27 RX ORDER — LISINOPRIL 40 MG/1
40 TABLET ORAL DAILY
Qty: 30 TABLET | Refills: 11 | OUTPATIENT
Start: 2024-12-27 | End: 2025-01-26

## 2025-04-07 ENCOUNTER — TELEPHONE (OUTPATIENT)
Facility: OTHER | Age: 40
End: 2025-04-07

## 2025-04-07 ENCOUNTER — TELEPHONE (OUTPATIENT)
Dept: FAMILY MEDICINE | Facility: CLINIC | Age: 40
End: 2025-04-07

## 2025-04-07 NOTE — PROGRESS NOTES
Patient seen in the ED on 4/5/2025 for hand pain.  Patient's call escalated to post ED text tracker.  Spoke with patient to assess for any additional concerns or needs.  Patient requested to be established with a PCP at [facility].  Patient's PCP appointment is scheduled for 4/23/2025 at 11:20 am with Lucinda Uribe MD.    Patient denied no further needs or concerns at this time.  Encounter closed.

## 2025-04-08 ENCOUNTER — TELEPHONE (OUTPATIENT)
Dept: FAMILY MEDICINE | Facility: CLINIC | Age: 40
End: 2025-04-08

## 2025-04-23 ENCOUNTER — OFFICE VISIT (OUTPATIENT)
Dept: FAMILY MEDICINE | Facility: CLINIC | Age: 40
End: 2025-04-23
Payer: COMMERCIAL

## 2025-04-23 VITALS
WEIGHT: 237.31 LBS | SYSTOLIC BLOOD PRESSURE: 160 MMHG | HEART RATE: 74 BPM | OXYGEN SATURATION: 97 % | HEIGHT: 72 IN | BODY MASS INDEX: 32.14 KG/M2 | DIASTOLIC BLOOD PRESSURE: 102 MMHG

## 2025-04-23 DIAGNOSIS — Z00.00 ANNUAL PHYSICAL EXAM: Primary | ICD-10-CM

## 2025-04-23 DIAGNOSIS — Z83.2 FAMILY HISTORY OF SICKLE CELL TRAIT: ICD-10-CM

## 2025-04-23 PROCEDURE — 1159F MED LIST DOCD IN RCRD: CPT | Mod: CPTII,S$GLB,, | Performed by: STUDENT IN AN ORGANIZED HEALTH CARE EDUCATION/TRAINING PROGRAM

## 2025-04-23 PROCEDURE — 1160F RVW MEDS BY RX/DR IN RCRD: CPT | Mod: CPTII,S$GLB,, | Performed by: STUDENT IN AN ORGANIZED HEALTH CARE EDUCATION/TRAINING PROGRAM

## 2025-04-23 PROCEDURE — 4010F ACE/ARB THERAPY RXD/TAKEN: CPT | Mod: CPTII,S$GLB,, | Performed by: STUDENT IN AN ORGANIZED HEALTH CARE EDUCATION/TRAINING PROGRAM

## 2025-04-23 PROCEDURE — 99204 OFFICE O/P NEW MOD 45 MIN: CPT | Mod: 25,S$GLB,, | Performed by: STUDENT IN AN ORGANIZED HEALTH CARE EDUCATION/TRAINING PROGRAM

## 2025-04-23 PROCEDURE — 3077F SYST BP >= 140 MM HG: CPT | Mod: CPTII,S$GLB,, | Performed by: STUDENT IN AN ORGANIZED HEALTH CARE EDUCATION/TRAINING PROGRAM

## 2025-04-23 PROCEDURE — 3080F DIAST BP >= 90 MM HG: CPT | Mod: CPTII,S$GLB,, | Performed by: STUDENT IN AN ORGANIZED HEALTH CARE EDUCATION/TRAINING PROGRAM

## 2025-04-23 PROCEDURE — 3008F BODY MASS INDEX DOCD: CPT | Mod: CPTII,S$GLB,, | Performed by: STUDENT IN AN ORGANIZED HEALTH CARE EDUCATION/TRAINING PROGRAM

## 2025-04-23 PROCEDURE — 3044F HG A1C LEVEL LT 7.0%: CPT | Mod: CPTII,S$GLB,, | Performed by: STUDENT IN AN ORGANIZED HEALTH CARE EDUCATION/TRAINING PROGRAM

## 2025-04-23 PROCEDURE — 99999 PR PBB SHADOW E&M-EST. PATIENT-LVL III: CPT | Mod: PBBFAC,,, | Performed by: STUDENT IN AN ORGANIZED HEALTH CARE EDUCATION/TRAINING PROGRAM

## 2025-04-23 PROCEDURE — 99386 PREV VISIT NEW AGE 40-64: CPT | Mod: S$GLB,,, | Performed by: STUDENT IN AN ORGANIZED HEALTH CARE EDUCATION/TRAINING PROGRAM

## 2025-04-23 RX ORDER — CHLORTHALIDONE 25 MG/1
25 TABLET ORAL DAILY
Qty: 30 TABLET | Refills: 11 | Status: SHIPPED | OUTPATIENT
Start: 2025-04-23 | End: 2026-04-23

## 2025-04-23 NOTE — PROGRESS NOTES
Patient ID: Reji Pond Jr. is a 39 y.o. male.    Chief Complaint: Establish Care, Follow-up (ED follow up/), and sickle cell genetics    History of Present Illness    CHIEF COMPLAINT:  Reji presents today for follow up of elevated blood pressure    HYPERTENSION:  He reports being able to recognize when blood pressure is elevated based on physical sensations, primarily experienced in the hospital setting and not at home. He continues Lisinopril, Carvedilol, and Nifedipine for blood pressure management.    DIET AND LIFESTYLE:  He reports recent weight loss through dietary changes, including temporarily eliminating meat and increasing vegetable and fruit consumption. He uses Mrs. Gibbons seasoning instead of salt. He has not consumed Coke or high fructose corn syrup for the past three years.    SOCIAL HISTORY:  He lives with family and has never smoked.    FAMILY HISTORY:  His father had a massive heart attack at age 51. His maternal grandfather had congestive heart failure and  at age 71. He reports family history of diabetes on paternal side, with multiple uncles requiring insulin therapy.    GENETIC CONCERNS:  He expresses concerns about sickle cell trait status as his son has been diagnosed with sickle cell trait. He reports never being informed of having sickle cell trait himself and is uncertain about the genetic inheritance pattern in his family.      ROS:  General: -fever, -chills, -fatigue, -weight gain, -weight loss  Eyes: -vision changes, -redness, -discharge  ENT: -ear pain, -nasal congestion, -sore throat  Cardiovascular: -chest pain, -palpitations, -lower extremity edema  Respiratory: -cough, -shortness of breath  Gastrointestinal: -abdominal pain, -nausea, -vomiting, -diarrhea, -constipation, -blood in stool  Genitourinary: -dysuria, -hematuria, -frequency  Musculoskeletal: -joint pain, -muscle pain  Skin: -rash, -lesion  Neurological: -headache, -dizziness, -numbness, -tingling  Psychiatric:  -anxiety, -depression, -sleep difficulty         Physical Exam    Vitals: Blood pressure is 160/102.  General: No acute distress. Well-developed. Well-nourished.  Eyes: EOMI. Sclerae anicteric.  HENT: Normocephalic. Atraumatic. Nares patent. Moist oral mucosa.  Ears: Bilateral TMs clear. Bilateral EACs clear.  Cardiovascular: Regular rate. Regular rhythm. No murmurs. No rubs. No gallops. Normal S1, S2.  Respiratory: Normal respiratory effort. Clear to auscultation bilaterally. No rales. No rhonchi. No wheezing.  Abdomen: Soft. Non-tender. Non-distended. Normoactive bowel sounds.  Musculoskeletal: No  obvious deformity.  Extremities: No lower extremity edema.  Neurological: Alert & oriented x3. No slurred speech. Normal gait.  Psychiatric: Normal mood. Normal affect. Good insight. Good judgment.  Skin: Warm. Dry. No rash.         Assessment & Plan    ESSENTIAL (PRIMARY) HYPERTENSION:  - Assessed the patient's blood pressure, which is currently uncontrolled based in-office readings (160/102, with previous readings of 212, 202, 170).  - Evaluated the current antihypertensive regimen (lisinopril, nifedipine) and determined it to be insufficient for adequate BP control.  - Prescribed chlorthalidone as a third-line agent due to maximized doses of current medications, to be taken with current medications if BP is elevated at home.  - Implemented home BP monitoring to confirm diagnosis and guide treatment.  - Educated the patient on white coat hypertension phenomenon.  - Instructed the patient to monitor BP at home using provided log.  - Recommend lifestyle modifications including a Mediterranean diet and low salt intake to help control blood pressure.  - Evaluated the patient's current blood pressure reading of 160/102, indicating uncontrolled hypertension.  - Prescribed chlorthalidone in addition to the existing blood pressure medication regimen to better control hypertension and prevent potential heart complications.  -  Emphasized the importance of blood pressure control in light of family history.  - Explained Mediterranean diet principles: focus on seafood, poultry, olive oil, lentils, chickpeas, beans, brown rice, and whole wheat bread.  - Discussed importance of low sodium intake for BP management.  - Reji to implement Mediterranean diet and reduce sodium intake.  - Reji to continue using Mrs. Gibbons seasoning instead of salt.    HYPERTENSIVE HEART DISEASE:  - Expressed concern about potential heart issues due to uncontrolled hypertension.  - Planned screening for additional cardiovascular risk factors (diabetes, hyperlipidemia) given family history and current presentation.  - Ordered cholesterol level check as part of cardiovascular risk assessment.  - Planned comprehensive cardiovascular risk assessment including cholesterol and diabetes screening.  - Ordered CMP, lipid panel, CBC, and hemoglobin electrophoresis.    FAMILY HISTORY OF ISCHEMIC HEART DISEASE AND OTHER CIRCULATORY SYSTEM DISEASES:  - Noted family history of early cardiovascular disease, which increases the patient's risk profile.  - Noted patient's family history of early cardiac events, specifically father's massive heart attack at age 51.  - Emphasized the importance of blood pressure control given the family history.    FAMILY HISTORY OF DIABETES MELLITUS:  - Noted family history of diabetes on father's side, with uncles taking insulin.  - Confirmed no personal history of diabetes or pre-diabetes based on previous checks.  - Planned to screen for diabetes as part of routine health maintenance.  - Recommend annual diabetes screening.    FAMILY HISTORY OF CARRIER OF GENETIC DISEASE:  - Evaluated the patient for potential sickle cell trait carrier status due to son's diagnosis.  - Explained sickle cell trait inheritance patterns to the patient.  - Offered to perform hemoglobin electrophoresis to check if the patient is a oreilly  ier of sickle cell  trait.    FOLLOW-UP:  - Scheduled a virtual visit in 2 weeks to reassess blood pressure control.       1. Annual physical exam  -     CBC Auto Differential; Future; Expected date: 04/23/2025  -     Hemoglobin A1C; Future; Expected date: 04/23/2025  -     Lipid Panel; Future; Expected date: 04/23/2025  -     Hepatic Function Panel; Future; Expected date: 04/23/2025    2. Family history of sickle cell trait  -     Hemoglobin Electrophoresis Evaluation, Blood; Future; Expected date: 04/23/2025    Other orders  -     chlorthalidone (HYGROTEN) 25 MG Tab; Take 1 tablet (25 mg total) by mouth once daily.  Dispense: 30 tablet; Refill: 11              No follow-ups on file.    This note was generated with the assistance of ambient listening technology. Verbal consent was obtained by the patient and accompanying visitor(s) for the recording of patient appointment to facilitate this note. I attest to having reviewed and edited the generated note for accuracy, though some syntax or spelling errors may persist. Please contact the author of this note for any clarification.

## 2025-04-25 ENCOUNTER — PATIENT MESSAGE (OUTPATIENT)
Dept: FAMILY MEDICINE | Facility: CLINIC | Age: 40
End: 2025-04-25
Payer: COMMERCIAL

## 2025-05-08 ENCOUNTER — OFFICE VISIT (OUTPATIENT)
Dept: FAMILY MEDICINE | Facility: CLINIC | Age: 40
End: 2025-05-08
Payer: COMMERCIAL

## 2025-05-08 VITALS
OXYGEN SATURATION: 97 % | TEMPERATURE: 99 F | WEIGHT: 229.19 LBS | SYSTOLIC BLOOD PRESSURE: 134 MMHG | HEIGHT: 72 IN | BODY MASS INDEX: 31.04 KG/M2 | HEART RATE: 70 BPM | DIASTOLIC BLOOD PRESSURE: 72 MMHG

## 2025-05-08 DIAGNOSIS — E66.09 CLASS 1 OBESITY DUE TO EXCESS CALORIES WITHOUT SERIOUS COMORBIDITY WITH BODY MASS INDEX (BMI) OF 31.0 TO 31.9 IN ADULT: ICD-10-CM

## 2025-05-08 DIAGNOSIS — I10 HYPERTENSION, UNSPECIFIED TYPE: Primary | ICD-10-CM

## 2025-05-08 DIAGNOSIS — E78.1 HYPERTRIGLYCERIDEMIA: ICD-10-CM

## 2025-05-08 DIAGNOSIS — E66.811 CLASS 1 OBESITY DUE TO EXCESS CALORIES WITHOUT SERIOUS COMORBIDITY WITH BODY MASS INDEX (BMI) OF 31.0 TO 31.9 IN ADULT: ICD-10-CM

## 2025-05-08 PROBLEM — S52.301A CLOSED FRACTURE OF MIDDLE OF RIGHT RADIUS AND ULNA: Status: RESOLVED | Noted: 2023-06-01 | Resolved: 2025-05-08

## 2025-05-08 PROBLEM — M23.92 ACUTE INTERNAL DERANGEMENT OF LEFT KNEE: Status: RESOLVED | Noted: 2020-07-15 | Resolved: 2025-05-08

## 2025-05-08 PROBLEM — S52.301A CLOSED FRACTURE OF MIDDLE OF RIGHT RADIUS AND ULNA: Status: ACTIVE | Noted: 2023-06-01

## 2025-05-08 PROBLEM — S52.201A CLOSED FRACTURE OF MIDDLE OF RIGHT RADIUS AND ULNA: Status: ACTIVE | Noted: 2023-06-01

## 2025-05-08 PROBLEM — R55 SYNCOPE: Status: RESOLVED | Noted: 2022-09-02 | Resolved: 2025-05-08

## 2025-05-08 PROBLEM — Z91.148 NON COMPLIANCE W MEDICATION REGIMEN: Status: RESOLVED | Noted: 2023-06-01 | Resolved: 2025-05-08

## 2025-05-08 PROBLEM — R29.898 DECREASED STRENGTH OF LOWER EXTREMITY: Status: RESOLVED | Noted: 2020-07-29 | Resolved: 2025-05-08

## 2025-05-08 PROBLEM — Z91.148 NON COMPLIANCE W MEDICATION REGIMEN: Status: ACTIVE | Noted: 2023-06-01

## 2025-05-08 PROBLEM — S52.201A CLOSED FRACTURE OF MIDDLE OF RIGHT RADIUS AND ULNA: Status: RESOLVED | Noted: 2023-06-01 | Resolved: 2025-05-08

## 2025-05-08 PROBLEM — M25.662 KNEE STIFFNESS, LEFT: Status: RESOLVED | Noted: 2020-07-15 | Resolved: 2025-05-08

## 2025-05-08 PROBLEM — R26.2 DIFFICULTY WALKING: Status: RESOLVED | Noted: 2020-07-29 | Resolved: 2025-05-08

## 2025-05-08 PROBLEM — M94.262 CHONDROMALACIA OF LEFT KNEE: Status: RESOLVED | Noted: 2020-07-15 | Resolved: 2025-05-08

## 2025-05-08 PROBLEM — S83.282A TEAR OF LATERAL MENISCUS OF LEFT KNEE, CURRENT: Status: RESOLVED | Noted: 2020-09-09 | Resolved: 2025-05-08

## 2025-05-08 PROCEDURE — 99214 OFFICE O/P EST MOD 30 MIN: CPT | Mod: S$GLB,,,

## 2025-05-08 PROCEDURE — 4010F ACE/ARB THERAPY RXD/TAKEN: CPT | Mod: CPTII,S$GLB,,

## 2025-05-08 PROCEDURE — 1159F MED LIST DOCD IN RCRD: CPT | Mod: CPTII,S$GLB,,

## 2025-05-08 PROCEDURE — 3078F DIAST BP <80 MM HG: CPT | Mod: CPTII,S$GLB,,

## 2025-05-08 PROCEDURE — 1160F RVW MEDS BY RX/DR IN RCRD: CPT | Mod: CPTII,S$GLB,,

## 2025-05-08 PROCEDURE — 99999 PR PBB SHADOW E&M-EST. PATIENT-LVL IV: CPT | Mod: PBBFAC,,,

## 2025-05-08 PROCEDURE — 3044F HG A1C LEVEL LT 7.0%: CPT | Mod: CPTII,S$GLB,,

## 2025-05-08 PROCEDURE — 3008F BODY MASS INDEX DOCD: CPT | Mod: CPTII,S$GLB,,

## 2025-05-08 PROCEDURE — G2211 COMPLEX E/M VISIT ADD ON: HCPCS | Mod: S$GLB,,,

## 2025-05-08 PROCEDURE — 3075F SYST BP GE 130 - 139MM HG: CPT | Mod: CPTII,S$GLB,,

## 2025-05-08 NOTE — PROGRESS NOTES
Subjective:      Reji Pond Jr. is a 39 y.o. male, patient of Lucinda Uribe MD with a PMH listed below.       History of Present Illness    CHIEF COMPLAINT:  Reji presents today for a 2 week follow-up of hypertension.    HYPERTENSION MANAGEMENT:  Chlorthalidone 25 mg daily was added to his HTN regimen two weeks ago per PCP. He continues Nifedipine (Procardia) 60 mg daily, Lisinopril 40 mg daily, and Carvedilol (Coreg) 6.25 mg twice daily. Reports home blood pressure readings less than 140/90 at home. Asymptomatic at visit today.     WEIGHT MANAGEMENT:  He reports weight loss from 237 lbs (4/23/2025) to 229 lbs today through dietary modifications, including adopting a vegetarian diet rich in fruits and vegetables. He follows a pattern of eating aligned with sunrise.    MEDICAL HISTORY:  He has a history of fatty liver.    OCCUPATIONAL HISTORY:  [place] works [place] a  [place] frequent climbing of ladders and stairs.         Past Medical History:   Diagnosis Date    Closed fracture of middle of right radius and ulna 06/01/2023    Added automatically from request for surgery [id number]      Elevated liver function tests 10/25/2018    Fatty liver 10/25/2018    Hypertension     Kidney stones     Tear of lateral meniscus of left knee, current 09/09/2020       Review of Systems   Constitutional:  Negative for chills and fever.   Eyes:  Negative for visual disturbance.   Respiratory:  Negative for cough, chest tightness and shortness of breath.    Cardiovascular:  Negative for chest pain, palpitations and leg swelling.   Gastrointestinal:  Negative for abdominal pain, constipation, diarrhea, nausea and vomiting.   Genitourinary:  Negative for dysuria, frequency, hematuria and urgency.   Musculoskeletal:  Negative for arthralgias and myalgias.   Skin:  Negative for rash.   Neurological:  Negative for dizziness, light-headedness, numbness and headaches.    Psychiatric/Behavioral:  Negative for confusion and dysphoric mood.           Objective:     Vitals:    05/08/25 1015   BP: 134/72   BP Location: Left arm   Patient Position: Sitting   Pulse: 70   Temp: 98.5 °F (36.9 °C)   TempSrc: Oral   SpO2: 97%   Weight: 103.9 kg (229 lb 2.7 oz)   Height: 6' (1.829 m)          Physical Exam  Vitals reviewed.   Constitutional:       General: He is awake. He is not in acute distress.     Appearance: He is obese. He is not ill-appearing.   HENT:      Mouth/Throat:      Mouth: Mucous membranes are moist.      Pharynx: Oropharynx is clear.   Eyes:      General:         Right eye: No discharge.         Left eye: No discharge.      Pupils: Pupils are equal, round, and reactive to light.   Cardiovascular:      Rate and Rhythm: Normal rate and regular rhythm.      Heart sounds: S1 normal and S2 normal.   Pulmonary:      Effort: Pulmonary effort is normal. No respiratory distress.      Breath sounds: Normal breath sounds.   Abdominal:      General: Bowel sounds are normal. There is no distension.      Palpations: Abdomen is soft.      Tenderness: There is no abdominal tenderness.   Musculoskeletal:         General: Normal range of motion.      Cervical back: Normal range of motion.      Right lower leg: No edema.      Left lower leg: No edema.   Skin:     General: Skin is warm and dry.      Findings: No rash.   Neurological:      General: No focal deficit present.      Mental Status: He is alert and oriented to person, place, and time.      Gait: Gait normal.   Psychiatric:         Mood and Affect: Mood normal.         Behavior: Behavior normal. Behavior is cooperative.             Assessment:         ICD-10-CM ICD-9-CM   1. Hypertension, unspecified type  I10 401.9   2. Class 1 obesity due to excess calories without serious comorbidity with body mass index (BMI) of 31.0 to 31.9 in adult  E66.811 278.00    E66.09 V85.31    Z68.31    3. Hypertriglyceridemia  E78.1 272.1       Plan:        Hypertension, unspecified type  -Chronic.   -Controlled at visit today,134/72. Asymptomatic.   -Continue Chlorthalidone 25 mg daily, Lisinopril 40 mg daily, Nifedipine 60 mg daily, Carvedilol 6.25 mg BID.  -Continue diet and lifestyle modifications. Encouraged 150 minutes of exercise per week as tolerated. DASH diet discussed at visit, handout provided.   -Follow up 6 months.     Class 1 obesity due to excess calories without serious comorbidity with body mass index (BMI) of 31.0 to 31.9 in adult  -Chronic.   -Weight loss from 237 lbs (4/23/2025) to 229 lbs today.   -Continue diet and lifestyle modifications. Encouraged 150 minutes of exercise per week as tolerated.   -Follow up 6 months.     Hypertriglyceridemia  -Chronic.   -Triglyceride: 144 (4/2025).   -Continue diet and lifestyle modifications. Encouraged 150 minutes of exercise per week as tolerated.   -Follow up 6 months.         RTC/ED precautions discussed where applicable.   Encouraged patient/caregiver to let me know if there are any further questions/concerns.   Patient/caretaker agrees with the treatment plan.     Follow up in about 6 months (around 11/8/2025) for Blood pressure.      GERSON Griffith  Family Medicine  [facility]     This note was generated with the assistance of ambient listening technology. Verbal consent was obtained by the patient and accompanying visitor(s) for the recording of patient appointment to facilitate this note. I attest to having reviewed and edited the generated note for accuracy, though some syntax or spelling errors may persist. Please contact the author of this note for any clarification.

## 2025-07-01 RX ORDER — CHLORTHALIDONE 25 MG/1
25 TABLET ORAL DAILY
Qty: 30 TABLET | Refills: 11 | OUTPATIENT
Start: 2025-07-01 | End: 2026-07-01

## 2025-07-19 ENCOUNTER — PATIENT MESSAGE (OUTPATIENT)
Dept: FAMILY MEDICINE | Facility: CLINIC | Age: 40
End: 2025-07-19
Payer: COMMERCIAL

## 2025-07-21 RX ORDER — CHLORTHALIDONE 25 MG/1
25 TABLET ORAL DAILY
Qty: 30 TABLET | Refills: 11 | Status: SHIPPED | OUTPATIENT
Start: 2025-07-21 | End: 2026-07-21

## 2025-07-21 NOTE — TELEPHONE ENCOUNTER
LOV with Lucinda Uribe MD , 4/23/2025    Pt seen in ER on 04/05/25, meds prescribed by ER physician Sofy Ayon MD     carvediloL (COREG) 6.25 MG tablet 180 tablet 3 4/5/2025 4/5/2026 --   Sig - Route: Take 1 tablet (6.25 mg total) by mouth 2 (two) times daily with meals. - Oral       lisinopriL (PRINIVIL,ZESTRIL) 40 MG tablet 90 tablet 3 4/5/2025 4/5/2026 --   Sig - Route: Take 1 tablet (40 mg total) by mouth once daily. - Oral

## (undated) DEVICE — BLADE OSC & SAGITTAL 13.0MM

## (undated) DEVICE — SUT VICRYL+ 1 CT1 18IN

## (undated) DEVICE — UNDERGLOVES BIOGEL PI SZ 7 LF

## (undated) DEVICE — SUT VICRYL PLUS 3-0 SH 18IN

## (undated) DEVICE — DRESSING AQUACEL AG ADV 3.5X6

## (undated) DEVICE — SEE MEDLINE ITEM 152530

## (undated) DEVICE — BLADE SURG STAINLESS STEEL #11

## (undated) DEVICE — SEE MEDLINE ITEM 157169

## (undated) DEVICE — SOL IRR NACL .9% 3000ML

## (undated) DEVICE — GOWN B1 X-LG X-LONG

## (undated) DEVICE — SHAVER ULTRAFFR 4.2MM

## (undated) DEVICE — DRAPE STERI INSTRUMENT 1018

## (undated) DEVICE — UNDERGLOVES BIOGEL PI SIZE 8.5

## (undated) DEVICE — DRESSING XEROFORM 1X8IN

## (undated) DEVICE — ELECTRODE 90 DEGREE ANGLE

## (undated) DEVICE — DRAPE STERI U-SHAPED 47X51IN

## (undated) DEVICE — DRAPE PLASTIC U 60X72

## (undated) DEVICE — TOURNIQUET SB QC DP 34X4IN

## (undated) DEVICE — SEE MEDLINE ITEM 152622

## (undated) DEVICE — PAD CAST SPECIALIST STRL 6

## (undated) DEVICE — SEE MEDLINE ITEM 146313

## (undated) DEVICE — SEE MEDLINE ITEM 157126

## (undated) DEVICE — SYR 30CC LUER LOCK

## (undated) DEVICE — Device

## (undated) DEVICE — GLOVE BIOGEL SKINSENSE PI 8.5

## (undated) DEVICE — ELECTRODE REM PLYHSV RETURN 9

## (undated) DEVICE — SEE MEDLINE ITEM 157150

## (undated) DEVICE — PAD ELECTRODE STER 1.5X3

## (undated) DEVICE — GAUZE SPONGE 4X4 12PLY

## (undated) DEVICE — DRESSING XEROFORM FOIL PK 1X8

## (undated) DEVICE — SEE MEDLINE ITEM 153151

## (undated) DEVICE — APPLICATOR CHLORAPREP ORN 26ML

## (undated) DEVICE — SOL 9P NACL IRR PIC IL

## (undated) DEVICE — GLOVE BIOGEL SKINSENSE PI 7.0

## (undated) DEVICE — SEE MEDLINE ITEM 152487

## (undated) DEVICE — GOWN SMARTGOWN LVL4 X-LONG XL

## (undated) DEVICE — PAD KNEE POLAR XL

## (undated) DEVICE — TUBE SET INFLOW/OUTFLOW

## (undated) DEVICE — NDL SPINAL 18GX3.5 SPINOCAN

## (undated) DEVICE — PAD ABD 8X10 STERILE

## (undated) DEVICE — BRACE KNEE T SCOPE PREMIER

## (undated) DEVICE — CONTAINER SPECIMEN STRL 4OZ